# Patient Record
Sex: FEMALE | Race: WHITE | Employment: OTHER | URBAN - NONMETROPOLITAN AREA
[De-identification: names, ages, dates, MRNs, and addresses within clinical notes are randomized per-mention and may not be internally consistent; named-entity substitution may affect disease eponyms.]

---

## 2021-09-28 ENCOUNTER — APPOINTMENT (OUTPATIENT)
Dept: INTERVENTIONAL RADIOLOGY/VASCULAR | Age: 66
End: 2021-09-28
Payer: MEDICARE

## 2021-09-28 ENCOUNTER — HOSPITAL ENCOUNTER (EMERGENCY)
Age: 66
Discharge: HOME | End: 2021-09-28
Attending: EMERGENCY MEDICINE
Payer: MEDICARE

## 2021-09-28 VITALS
DIASTOLIC BLOOD PRESSURE: 93 MMHG | WEIGHT: 150 LBS | OXYGEN SATURATION: 96 % | HEART RATE: 92 BPM | BODY MASS INDEX: 21 KG/M2 | TEMPERATURE: 98 F | RESPIRATION RATE: 18 BRPM | HEIGHT: 71 IN | SYSTOLIC BLOOD PRESSURE: 134 MMHG

## 2021-09-28 DIAGNOSIS — I82.401 ACUTE DEEP VEIN THROMBOSIS (DVT) OF RIGHT LOWER EXTREMITY, UNSPECIFIED VEIN (HCC): Primary | ICD-10-CM

## 2021-09-28 LAB
ANION GAP SERPL CALCULATED.3IONS-SCNC: 12 MEQ/L (ref 8–16)
BASOPHILS # BLD: 0.4 %
BASOPHILS ABSOLUTE: 0 THOU/MM3 (ref 0–0.1)
BUN BLDV-MCNC: 20 MG/DL (ref 7–22)
CALCIUM SERPL-MCNC: 9.1 MG/DL (ref 8.5–10.5)
CHLORIDE BLD-SCNC: 103 MEQ/L (ref 98–111)
CO2: 25 MEQ/L (ref 23–33)
CREAT SERPL-MCNC: 1 MG/DL (ref 0.4–1.2)
EOSINOPHIL # BLD: 2 %
EOSINOPHILS ABSOLUTE: 0.2 THOU/MM3 (ref 0–0.4)
ERYTHROCYTE [DISTWIDTH] IN BLOOD BY AUTOMATED COUNT: 12.8 % (ref 11.5–14.5)
ERYTHROCYTE [DISTWIDTH] IN BLOOD BY AUTOMATED COUNT: 46.5 FL (ref 35–45)
GFR SERPL CREATININE-BSD FRML MDRD: 55 ML/MIN/1.73M2
GLUCOSE BLD-MCNC: 122 MG/DL (ref 70–108)
HCT VFR BLD CALC: 39.5 % (ref 37–47)
HEMOGLOBIN: 12.2 GM/DL (ref 12–16)
IMMATURE GRANS (ABS): 0.06 THOU/MM3 (ref 0–0.07)
IMMATURE GRANULOCYTES: 0.5 %
LYMPHOCYTES # BLD: 10.9 %
LYMPHOCYTES ABSOLUTE: 1.2 THOU/MM3 (ref 1–4.8)
MCH RBC QN AUTO: 30.8 PG (ref 26–33)
MCHC RBC AUTO-ENTMCNC: 30.9 GM/DL (ref 32.2–35.5)
MCV RBC AUTO: 99.7 FL (ref 81–99)
MONOCYTES # BLD: 10.7 %
MONOCYTES ABSOLUTE: 1.2 THOU/MM3 (ref 0.4–1.3)
NUCLEATED RED BLOOD CELLS: 0 /100 WBC
PLATELET # BLD: 296 THOU/MM3 (ref 130–400)
PMV BLD AUTO: 9.5 FL (ref 9.4–12.4)
POTASSIUM REFLEX MAGNESIUM: 4.6 MEQ/L (ref 3.5–5.2)
PRO-BNP: 794.9 PG/ML (ref 0–900)
RBC # BLD: 3.96 MILL/MM3 (ref 4.2–5.4)
SEG NEUTROPHILS: 75.5 %
SEGMENTED NEUTROPHILS ABSOLUTE COUNT: 8.6 THOU/MM3 (ref 1.8–7.7)
SODIUM BLD-SCNC: 140 MEQ/L (ref 135–145)
TROPONIN T: < 0.01 NG/ML
WBC # BLD: 11.4 THOU/MM3 (ref 4.8–10.8)

## 2021-09-28 PROCEDURE — 6360000002 HC RX W HCPCS: Performed by: STUDENT IN AN ORGANIZED HEALTH CARE EDUCATION/TRAINING PROGRAM

## 2021-09-28 PROCEDURE — 84484 ASSAY OF TROPONIN QUANT: CPT

## 2021-09-28 PROCEDURE — 93971 EXTREMITY STUDY: CPT

## 2021-09-28 PROCEDURE — 93005 ELECTROCARDIOGRAM TRACING: CPT | Performed by: STUDENT IN AN ORGANIZED HEALTH CARE EDUCATION/TRAINING PROGRAM

## 2021-09-28 PROCEDURE — 83880 ASSAY OF NATRIURETIC PEPTIDE: CPT

## 2021-09-28 PROCEDURE — 99283 EMERGENCY DEPT VISIT LOW MDM: CPT

## 2021-09-28 PROCEDURE — 6370000000 HC RX 637 (ALT 250 FOR IP): Performed by: EMERGENCY MEDICINE

## 2021-09-28 PROCEDURE — 85025 COMPLETE CBC W/AUTO DIFF WBC: CPT

## 2021-09-28 PROCEDURE — 36415 COLL VENOUS BLD VENIPUNCTURE: CPT

## 2021-09-28 PROCEDURE — 96372 THER/PROPH/DIAG INJ SC/IM: CPT

## 2021-09-28 PROCEDURE — 80048 BASIC METABOLIC PNL TOTAL CA: CPT

## 2021-09-28 RX ORDER — RIVAROXABAN 15 MG-20MG
KIT ORAL
Qty: 1 EACH | Refills: 0 | Status: SHIPPED | OUTPATIENT
Start: 2021-09-28 | End: 2021-11-11

## 2021-09-28 RX ORDER — KETOROLAC TROMETHAMINE 30 MG/ML
30 INJECTION, SOLUTION INTRAMUSCULAR; INTRAVENOUS ONCE
Status: DISCONTINUED | OUTPATIENT
Start: 2021-09-28 | End: 2021-09-28

## 2021-09-28 RX ORDER — HYDROCODONE BITARTRATE AND ACETAMINOPHEN 5; 325 MG/1; MG/1
1 TABLET ORAL 2 TIMES DAILY PRN
Qty: 10 TABLET | Refills: 0 | Status: SHIPPED | OUTPATIENT
Start: 2021-09-28 | End: 2021-09-30

## 2021-09-28 RX ORDER — HYDROCODONE BITARTRATE AND ACETAMINOPHEN 5; 325 MG/1; MG/1
1 TABLET ORAL ONCE
Status: COMPLETED | OUTPATIENT
Start: 2021-09-28 | End: 2021-09-28

## 2021-09-28 RX ADMIN — HYDROCODONE BITARTRATE AND ACETAMINOPHEN 1 TABLET: 5; 325 TABLET ORAL at 12:21

## 2021-09-28 RX ADMIN — ENOXAPARIN SODIUM 70 MG: 80 INJECTION SUBCUTANEOUS at 14:20

## 2021-09-28 ASSESSMENT — PAIN DESCRIPTION - LOCATION: LOCATION: LEG

## 2021-09-28 ASSESSMENT — PAIN DESCRIPTION - PAIN TYPE: TYPE: ACUTE PAIN

## 2021-09-28 ASSESSMENT — PAIN SCALES - GENERAL: PAINLEVEL_OUTOF10: 10

## 2021-09-28 ASSESSMENT — PAIN DESCRIPTION - DESCRIPTORS: DESCRIPTORS: SHARP;CONSTANT

## 2021-09-28 ASSESSMENT — ENCOUNTER SYMPTOMS
EYES NEGATIVE: 1
RESPIRATORY NEGATIVE: 1
GASTROINTESTINAL NEGATIVE: 1

## 2021-09-28 ASSESSMENT — PAIN DESCRIPTION - ORIENTATION: ORIENTATION: RIGHT

## 2021-09-28 NOTE — ED NOTES
PT presents to the ed with c/o severe right leg pain. PT states that she travel to [place] last weekend and has had pain since. No known injury. PT had hx of dvt. PT denies taking anything for pain at this time.       [place], [place]  45/20/88 2819

## 2021-09-28 NOTE — ED PROVIDER NOTES
[address] [address]          Pt Name: Karey Case  MRN: [id number]  [place] 1955  Date of evaluation: 9/28/2021  Treating Resident Physician: Concha Villareal MD  Supervising Physician: Tacho Juares MD.    [address] [place] [address]       Chief Complaint   Patient presents with    Leg Pain     History obtained from the patient. HISTORY OF PRESENT ILLNESS    HPI  Karey Case is a 74 y.o. female who presents to the emergency department for evaluation of leg pain. Patient refers 2 days of right leg pain associated with swelling. Symptoms started after travel from [place]. Patient denies shortness of breath or chest pain however patient has had previous DVT as well as pulmonary embolisms. The patient has no other acute complaints at this time. REVIEW OF SYSTEMS   Review of Systems   Constitutional: Negative. HENT: Negative. Eyes: Negative. Respiratory: Negative. Cardiovascular: Negative. Gastrointestinal: Negative. Genitourinary: Negative. Musculoskeletal: Negative. Neurological: Negative. Psychiatric/Behavioral: Negative. PAST MEDICAL AND SURGICAL HISTORY   No past medical history on file. No past surgical history on file. MEDICATIONS   No current facility-administered medications for this encounter. Current Outpatient Medications:     rivaroxaban (XARELTO STARTER PACK) 15 & 20 MG Starter Pack, Oral: 15 mg twice daily with food for 21 days followed by 20 mg once daily with food. , Disp: 1 each, Rfl: 0    HYDROcodone-acetaminophen (NORCO) 5-325 MG per tablet, Take 1 tablet by mouth 2 times daily as needed for Pain for up to 5 days. , Disp: 10 tablet, Rfl: 0      SOCIAL HISTORY     Social History     Social History Narrative    Not on file     Social History     Tobacco Use    Smoking status: Not on file   Substance Use Topics    Alcohol use: Not on file    Drug use: Not on file ALLERGIES     Allergies   Allergen Reactions    Penicillins          FAMILY HISTORY   No family history on file. PREVIOUS RECORDS   Previous records reviewed: Previous medical history of factor V deficiency, DVT both sides, pulmonary embolism. Current medications aspirin. Denies recent trauma, no new surgeries. .        PHYSICAL EXAM     ED Triage Vitals [09/28/21 0958]   BP Temp Temp Source Pulse Resp SpO2 Height Weight   (!) 134/93 98 °F (36.7 °C) Oral 92 18 96 % 5' 10.5\" (1.791 m) 150 lb (68 kg)     Initial vital signs and nursing assessment reviewed and abnormal from Tachycardia, high systolic blood pressure. Body mass index is 21.22 kg/m². Pulsoximetry is normal per my interpretation. Additional Vital Signs:  Vitals:    09/28/21 0958   BP: (!) 134/93   Pulse: 92   Resp: 18   Temp: 98 °F (36.7 °C)   SpO2: 96%       Physical Exam  Constitutional:       Appearance: Normal appearance. HENT:      Head: Normocephalic and atraumatic. Nose: Nose normal.      Mouth/Throat:      Mouth: Mucous membranes are moist.   Eyes:      Pupils: Pupils are equal, round, and reactive to light. Cardiovascular:      Rate and Rhythm: Regular rhythm. Tachycardia present. Pulses: Normal pulses. Heart sounds: Normal heart sounds. Pulmonary:      Effort: Pulmonary effort is normal.      Breath sounds: Normal breath sounds. Abdominal:      General: Abdomen is flat. Palpations: Abdomen is soft. Musculoskeletal:         General: Swelling and tenderness (Right calf, bilateral pulses present, increased diameter more than 2 cm) present. Cervical back: Normal range of motion. Skin:     General: Skin is warm. Capillary Refill: Capillary refill takes less than 2 seconds. Neurological:      General: No focal deficit present. Mental Status: She is alert and oriented to person, place, and time.              MEDICAL DECISION MAKING   Initial Assessment:   3 69-year-old year old patient complaining of right leg pain and edema, risk factors associated. Physical examination showed increased diameter. Considering DVT we will ask venous Doppler as well has lab tests to rule out cardiac strain. 2. Clinical impression  a. Right DVT high risk/Wells 6    Plan:    Venous Doppler right leg   Troponin, BMP, CBC, BNP        ED RESULTS   Laboratory results:  Labs Reviewed   CBC WITH AUTO DIFFERENTIAL - Abnormal; Notable for the following components:       Result Value    WBC 11.4 (*)     RBC 3.96 (*)     MCV 99.7 (*)     MCHC 30.9 (*)     RDW-SD 46.5 (*)     Segs Absolute 8.6 (*)     All other components within normal limits   BASIC METABOLIC PANEL W/ REFLEX TO MG FOR LOW K - Abnormal; Notable for the following components:    Glucose 122 (*)     All other components within normal limits   GLOMERULAR FILTRATION RATE, ESTIMATED - Abnormal; Notable for the following components:    Est, Glom Filt Rate 55 (*)     All other components within normal limits   BRAIN NATRIURETIC PEPTIDE   TROPONIN   ANION GAP       Radiologic studies results:  VL DUP LOWER EXTREMITY VENOUS RIGHT   Final Result      1. Lack of compressibility in the right mid and distal superficial femoral, popliteal, posterior tibial, peroneal and anterior tibial veins suspicious for thrombosis. 2. There is normal flow in the right common femoral vein and greater saphenous vein. .               **This report has been created using voice recognition software. It may contain minor errors which are inherent in voice recognition technology. **      Final report electronically signed by DR Chelsey Alston on 9/28/2021 12:58 PM          ED Medications administered this visit:   Medications   HYDROcodone-acetaminophen (NORCO) 5-325 MG per tablet 1 tablet (1 tablet Oral Given 9/28/21 1221)   enoxaparin (LOVENOX) injection 70 mg (70 mg SubCUTAneous Given 9/28/21 1420)         ED COURSE     ED Course as of Sep 28 1431   Tue Sep 28, 2021   1316 Lack of compressibility in the right mid and distal superficial femoral, popliteal, posterior tibial, peroneal and anterior tibial veins suspicious for thrombosis. 2. There is normal flow in the right common femoral vein and greater saphenous vein. .    [JR]   1342 Pro-BNP: 794.9 [JR]   1343 Leukocytosis mild   CBC Auto Differential(!):    WBC 11.4(!)   RBC 3.96(!)   Hemoglobin Quant 12.2   Hematocrit 39.5   MCV 99.7(!)   MCH 30.8   MCHC 30.9(!)   RDW-CV 12.8   RDW-SD 46.5(!)   Platelet Count 874   MPV 9.5   Seg Neutrophils 75.5   Lymphocytes 10.9   Monocytes 10.7   Eosinophils 2.0   Basophils 0.4   Immature Granulocytes 0.5   Segs Absolute 8.6(!)   Lymphocytes Absolute 1.2   Monocytes Absolute 1.2   Eosinophils Absolute 0.2   Basophils Absolute 0.0   Immature Grans (Abs) 0.06   Nucleated Red Blood Cells 0 [JR]   1343 Normal   Troponin:    Troponin T < 0.010 [JR]   1343 Normal   Brain Natriuretic Peptide:    Pro-.9 [JR]   1343 Normal   Basic Metabolic Panel w/ Reflex to MG(!):    Sodium 140   Potassium 4.6   Chloride 103   CO2 25   Glucose 122(!)   BUN 20   Creatinine 1.0   Calcium 9.1 [JR]   1344 Sinus rhythm, normal heart rate, normal P wave, narrow QRS, no ST elevation   EKG 12 Lead [JR]   1428 Mild leukocytosis   CBC Auto Differential(!):    WBC 11.4(!)   RBC 3.96(!)   Hemoglobin Quant 12.2   Hematocrit 39.5   MCV 99.7(!)   MCH 30.8   MCHC 30.9(!)   RDW-CV 12.8   RDW-SD 46.5(!)   Platelet Count 850   MPV 9.5   Seg Neutrophils 75.5   Lymphocytes 10.9   Monocytes 10.7   Eosinophils 2.0   Basophils 0.4   Immature Granulocytes 0.5   Segs Absolute 8.6(!)   Lymphocytes Absolute 1.2   Monocytes Absolute 1.2   Eosinophils Absolute 0.2   Basophils Absolute 0.0   Immature Grans (Abs) 0.06   Nucleated Red Blood Cells 0 [JR]   1428 Normal   Troponin:    Troponin T < 0.010 [JR]   1428 Normal   Basic Metabolic Panel w/ Reflex to MG(!):    Sodium 140   Potassium 4.6   Chloride 103   CO2 25   Glucose 122(!)   BUN 20   Creatinine 1.0   Calcium 9.1 [JR]   1428 Normal   Brain Natriuretic Peptide:    Pro-.9 [JR]      ED Course User Index  [JR] Monalisa Mayo MD       Reassessment  78year-old patient complaining of leg pain, with evidence of distal DVT. Laboratory tests negative for right heart failure, patient with no shortness of breath, no chest pain, with adequate answer to analgesia. We started anticoagulation during ED observation. An appointment with internal medicine was a scheduled as well has oral anticoagulation Xarelto was prescribed. Alarm signs and recommendations were given to patient understands and agrees. Decision to discharge    Strict return precautions and follow up instructions were discussed with the patient prior to discharge, with which the patient agrees. MEDICATION CHANGES     Discharge Medication List as of 9/28/2021  2:23 PM      START taking these medications    Details   rivaroxaban (XARELTO STARTER PACK) 15 & 20 MG Starter Pack Oral: 15 mg twice daily with food for 21 days followed by 20 mg once daily with food. , Disp-1 each, R-0Print      HYDROcodone-acetaminophen (NORCO) 5-325 MG per tablet Take 1 tablet by mouth 2 times daily as needed for Pain for up to 5 days. , Disp-10 tablet, R-0Print               FINAL DISPOSITION     Final diagnoses:   Acute deep vein thrombosis (DVT) of right lower extremity, unspecified vein (HCC)     Condition: condition: good  Dispo: Discharge to home      This transcription was electronically signed. Parts of this transcriptions may have been dictated by use of voice recognition software and electronically transcribed, and parts may have been transcribed with the assistance of an ED scribe. The transcription may contain errors not detected in proofreading. Please refer to my supervising physician's documentation if my documentation differs.     Electronically Signed: Monalisa Mayo MD, 09/28/21, 2:31 PM         Monalisa Mayo MD  Resident  09/28/21 1466

## 2021-09-30 ENCOUNTER — OFFICE VISIT (OUTPATIENT)
Dept: INTERNAL MEDICINE CLINIC | Age: 66
End: 2021-09-30
Payer: MEDICARE

## 2021-09-30 ENCOUNTER — HOSPITAL ENCOUNTER (OUTPATIENT)
Age: 66
Discharge: HOME | End: 2021-09-30
Payer: MEDICARE

## 2021-09-30 ENCOUNTER — TELEPHONE (OUTPATIENT)
Dept: CARDIOLOGY CLINIC | Age: 66
End: 2021-09-30

## 2021-09-30 VITALS
DIASTOLIC BLOOD PRESSURE: 74 MMHG | TEMPERATURE: 97.5 F | WEIGHT: 180.6 LBS | HEART RATE: 84 BPM | BODY MASS INDEX: 25.28 KG/M2 | HEIGHT: 71 IN | SYSTOLIC BLOOD PRESSURE: 122 MMHG

## 2021-09-30 DIAGNOSIS — I82.401 ACUTE DEEP VEIN THROMBOSIS (DVT) OF RIGHT LOWER EXTREMITY, UNSPECIFIED VEIN (HCC): ICD-10-CM

## 2021-09-30 DIAGNOSIS — R30.0 DYSURIA: ICD-10-CM

## 2021-09-30 DIAGNOSIS — I82.401 ACUTE DEEP VEIN THROMBOSIS (DVT) OF RIGHT LOWER EXTREMITY, UNSPECIFIED VEIN (HCC): Primary | ICD-10-CM

## 2021-09-30 LAB
ANION GAP SERPL CALCULATED.3IONS-SCNC: 12 MEQ/L (ref 8–16)
BACTERIA: ABNORMAL
BILIRUBIN URINE: NEGATIVE
BLOOD, URINE: ABNORMAL
BUN BLDV-MCNC: 24 MG/DL (ref 7–22)
CALCIUM SERPL-MCNC: 8.8 MG/DL (ref 8.5–10.5)
CASTS: ABNORMAL /LPF
CASTS: ABNORMAL /LPF
CHARACTER, URINE: CLEAR
CHLORIDE BLD-SCNC: 101 MEQ/L (ref 98–111)
CO2: 24 MEQ/L (ref 23–33)
COLOR: YELLOW
CREAT SERPL-MCNC: 1 MG/DL (ref 0.4–1.2)
CRYSTALS: ABNORMAL
EKG ATRIAL RATE: 74 BPM
EKG P AXIS: 48 DEGREES
EKG P-R INTERVAL: 134 MS
EKG Q-T INTERVAL: 390 MS
EKG QRS DURATION: 90 MS
EKG QTC CALCULATION (BAZETT): 432 MS
EKG R AXIS: 49 DEGREES
EKG T AXIS: 40 DEGREES
EKG VENTRICULAR RATE: 74 BPM
EPITHELIAL CELLS, UA: ABNORMAL /HPF
GFR SERPL CREATININE-BSD FRML MDRD: 55 ML/MIN/1.73M2
GLUCOSE BLD-MCNC: 94 MG/DL (ref 70–108)
GLUCOSE, URINE: NEGATIVE MG/DL
KETONES, URINE: NEGATIVE
LEUKOCYTE ESTERASE, URINE: NEGATIVE
MISCELLANEOUS LAB TEST RESULT: ABNORMAL
NITRITE, URINE: NEGATIVE
PH UA: 5 (ref 5–9)
POTASSIUM SERPL-SCNC: 4.8 MEQ/L (ref 3.5–5.2)
PROTEIN UA: 30 MG/DL
RBC URINE: ABNORMAL /HPF
RENAL EPITHELIAL, UA: ABNORMAL
SODIUM BLD-SCNC: 137 MEQ/L (ref 135–145)
SPECIFIC GRAVITY UA: 1.01 (ref 1–1.03)
UROBILINOGEN, URINE: 0.2 EU/DL (ref 0–1)
WBC UA: ABNORMAL /HPF
YEAST: ABNORMAL

## 2021-09-30 PROCEDURE — 1123F ACP DISCUSS/DSCN MKR DOCD: CPT | Performed by: STUDENT IN AN ORGANIZED HEALTH CARE EDUCATION/TRAINING PROGRAM

## 2021-09-30 PROCEDURE — 36415 COLL VENOUS BLD VENIPUNCTURE: CPT

## 2021-09-30 PROCEDURE — G8400 PT W/DXA NO RESULTS DOC: HCPCS | Performed by: STUDENT IN AN ORGANIZED HEALTH CARE EDUCATION/TRAINING PROGRAM

## 2021-09-30 PROCEDURE — 93010 ELECTROCARDIOGRAM REPORT: CPT | Performed by: INTERNAL MEDICINE

## 2021-09-30 PROCEDURE — 99204 OFFICE O/P NEW MOD 45 MIN: CPT | Performed by: STUDENT IN AN ORGANIZED HEALTH CARE EDUCATION/TRAINING PROGRAM

## 2021-09-30 PROCEDURE — 1090F PRES/ABSN URINE INCON ASSESS: CPT | Performed by: STUDENT IN AN ORGANIZED HEALTH CARE EDUCATION/TRAINING PROGRAM

## 2021-09-30 PROCEDURE — 80048 BASIC METABOLIC PNL TOTAL CA: CPT

## 2021-09-30 PROCEDURE — G8427 DOCREV CUR MEDS BY ELIG CLIN: HCPCS | Performed by: STUDENT IN AN ORGANIZED HEALTH CARE EDUCATION/TRAINING PROGRAM

## 2021-09-30 PROCEDURE — 1036F TOBACCO NON-USER: CPT | Performed by: STUDENT IN AN ORGANIZED HEALTH CARE EDUCATION/TRAINING PROGRAM

## 2021-09-30 PROCEDURE — G8417 CALC BMI ABV UP PARAM F/U: HCPCS | Performed by: STUDENT IN AN ORGANIZED HEALTH CARE EDUCATION/TRAINING PROGRAM

## 2021-09-30 PROCEDURE — 3017F COLORECTAL CA SCREEN DOC REV: CPT | Performed by: STUDENT IN AN ORGANIZED HEALTH CARE EDUCATION/TRAINING PROGRAM

## 2021-09-30 PROCEDURE — 4040F PNEUMOC VAC/ADMIN/RCVD: CPT | Performed by: STUDENT IN AN ORGANIZED HEALTH CARE EDUCATION/TRAINING PROGRAM

## 2021-09-30 PROCEDURE — 81001 URINALYSIS AUTO W/SCOPE: CPT

## 2021-09-30 RX ORDER — RIVAROXABAN 15 MG-20MG
KIT ORAL
Qty: 1 EACH | Refills: 0 | Status: CANCELLED | OUTPATIENT
Start: 2021-09-30

## 2021-09-30 RX ORDER — HYDROCODONE BITARTRATE AND ACETAMINOPHEN 5; 325 MG/1; MG/1
1 TABLET ORAL EVERY 6 HOURS PRN
Qty: 12 TABLET | Refills: 0 | Status: SHIPPED | OUTPATIENT
Start: 2021-10-02 | End: 2021-10-08

## 2021-09-30 SDOH — ECONOMIC STABILITY: FOOD INSECURITY: WITHIN THE PAST 12 MONTHS, THE FOOD YOU BOUGHT JUST DIDN'T LAST AND YOU DIDN'T HAVE MONEY TO GET MORE.: NEVER TRUE

## 2021-09-30 SDOH — ECONOMIC STABILITY: FOOD INSECURITY: WITHIN THE PAST 12 MONTHS, YOU WORRIED THAT YOUR FOOD WOULD RUN OUT BEFORE YOU GOT MONEY TO BUY MORE.: NEVER TRUE

## 2021-09-30 ASSESSMENT — ENCOUNTER SYMPTOMS
RHINORRHEA: 0
COUGH: 0
COLOR CHANGE: 0
SHORTNESS OF BREATH: 0
PHOTOPHOBIA: 0
BLOOD IN STOOL: 0
TROUBLE SWALLOWING: 0
ABDOMINAL PAIN: 0

## 2021-09-30 ASSESSMENT — SOCIAL DETERMINANTS OF HEALTH (SDOH): HOW HARD IS IT FOR YOU TO PAY FOR THE VERY BASICS LIKE FOOD, HOUSING, MEDICAL CARE, AND HEATING?: NOT HARD AT ALL

## 2021-09-30 ASSESSMENT — PATIENT HEALTH QUESTIONNAIRE - PHQ9
SUM OF ALL RESPONSES TO PHQ QUESTIONS 1-9: 0
SUM OF ALL RESPONSES TO PHQ QUESTIONS 1-9: 0
SUM OF ALL RESPONSES TO PHQ9 QUESTIONS 1 & 2: 0
SUM OF ALL RESPONSES TO PHQ QUESTIONS 1-9: 0
2. FEELING DOWN, DEPRESSED OR HOPELESS: 0
1. LITTLE INTEREST OR PLEASURE IN DOING THINGS: 0

## 2021-09-30 NOTE — PROGRESS NOTES
Patient Name: Davion Turner  YOB: 1955  MRN: [id number]  Office visit date: 9/30/2021    Chief Complaint: Follow-Up from Hospital (f/u [facility]  DVT right leg started on Xarelto )    Assessment/Plan:  1. Acute deep vein thrombosis (DVT) of right lower extremity, unspecified vein (HCC)  -     Mercy Klein MD, Cardiology, [facility]  -     rivaroxaban (XARELTO) 20 MG TABS tablet; Take 1 tablet by mouth daily (with breakfast), Disp-90 tablet, R-1Normal  -     Basic Metabolic Panel; Future  -     HYDROcodone-acetaminophen (NORCO) 5-325 MG per tablet; Take 1 tablet by mouth every 6 hours as needed for Pain for up to 6 days. Intended supply: 7 days. Take lowest dose possible to manage pain, Disp-12 tablet, R-0Normal  2. Dysuria  -     Urinalysis With Microscopic; Future  -     Urine Rt Reflex To Culture  -     Basic Metabolic Panel; Future    -Sent prescription for Xarelto maintenance dose after completing the starter pack that was initiated in emergency department. Also gave short course of Norco until evaluated by interventional cardiology. Return in about 3 weeks (around 10/21/2021). Subjective/Objective:    HPI:     Davion Turner is a 74 y.o. female who presents for a new patient visit. She is here for evaluation of Follow-Up from Hospital (f/u [facility]  DVT right leg started on Xarelto )    Patient presents for initial office visit after being evaluated in the ED for RLE swelling and pain on 9/28 which started shortly after completing 2.5-hour trip from [place]. Further work-up revealed extensive RLE DVT. Patient was discharged from the ED on a starter pack of Xarelto and Norco.  On questioning during office visit, patient endorsed history of factor V Leiden mutation, prior DVTs and PE, and COPD. Today, she endorses dark appearing urine without gross hematuria which may be attributable to poor p.o. intake.   Patient has not been hydrating appropriately because she experiences significant amount of pain while ambulating and wanted to minimize bathroom visits. Previously, about 4 years ago patient was on Coumadin which she stopped by personal choice. While on Coumadin she endorsed frequent nosebleeds and easy bruising. Since stopping Coumadin she has not had any issues until now. Patient is [facility] [employer] therefore we had an extensive discussion regarding reversibility of various agents and presented patient with options. [facility]/b/a of [facility]w [facility], she understands. As of today patient decided to continue with Xarelto. Due to severe pain that the patient experiences, sent referral to Dr. Judith Torres cardiology to evaluate for potential intervention. Past Medical History:   Diagnosis Date    Factor V Leiden [facility])        Past Surgical History:   Procedure Laterality Date    SHOULDER SURGERY         Current Outpatient Medications   Medication Sig Dispense Refill    rivaroxaban (XARELTO) 20 MG TABS tablet Take 1 tablet by mouth daily (with breakfast) 90 tablet 1    [START ON 10/2/2021] HYDROcodone-acetaminophen (NORCO) 5-325 MG per tablet Take 1 tablet by mouth every 6 hours as needed for Pain for up to 6 days. Intended supply: 7 days. Take lowest dose possible to manage pain 12 tablet 0    rivaroxaban (XARELTO STARTER PACK) 15 & 20 MG Starter Pack Oral: 15 mg twice daily with food for 21 days followed by 20 mg once daily with food. 1 each 0     No current facility-administered medications for this visit.        Allergies   Allergen Reactions    Penicillins        Social History     Socioeconomic History    Marital status:      Spouse name: Not on file    Number of children: Not on file    Years of education: Not on file    Highest education level: Not on file   Occupational History    Not on file   Tobacco Use    Smoking status: Never Smoker    Smokeless tobacco: Never Used   Substance and Sexual Activity    Alcohol use: Yes     Comment: rarely    Drug use: Never    Sexual activity: Not on file   Other Topics Concern    Not on file   Social History Narrative    Not on file     Social Determinants of Health     Financial Resource Strain: Low Risk     Difficulty of Paying Living Expenses: Not hard at all   Food Insecurity: No Food Insecurity    Worried About Running Out of Food in the Last Year: Never true    [address] in the Last Year: Never true   Transportation Needs:     Lack of Transportation (Medical):  Lack of Transportation (Non-Medical):    Physical Activity:     Days of Exercise per Week:     Minutes of Exercise per Session:    Stress:     Feeling of Stress :    Social Connections:     Frequency of Communication with Friends and Family:     Frequency of Social Gatherings with Friends and Family:     Attends Muslim Services:     Active Member of Clubs or Organizations:     Attends Club or Organization Meetings:     Marital Status:    Intimate Partner Violence:     Fear of Current or Ex-Partner:     Emotionally Abused:     Physically Abused:     Sexually Abused:        Family History   Problem Relation Age of Onset    Coronary Art Dis Father     Cancer Brother        Review of Systems   Constitutional: Negative for fatigue and fever. HENT: Negative for rhinorrhea and trouble swallowing. Eyes: Negative for photophobia and visual disturbance. Respiratory: Negative for cough and shortness of breath. Cardiovascular: Positive for leg swelling. Negative for chest pain. Gastrointestinal: Negative for abdominal pain and blood in stool. Endocrine: Negative for cold intolerance and heat intolerance. Genitourinary: Positive for dysuria. Negative for flank pain. Musculoskeletal: Negative for arthralgias and myalgias. Skin: Negative for color change and wound. Allergic/Immunologic: Negative for immunocompromised state. Neurological: Negative for syncope and headaches.    Hematological: Negative for adenopathy. Does not bruise/bleed easily. Psychiatric/Behavioral: Negative for agitation and hallucinations. Vitals:    09/30/21 1304   BP: 122/74   Site: Left Upper Arm   Position: Sitting   Cuff Size: Medium Adult   Pulse: 84   Temp: 97.5 °F (36.4 °C)   Weight: 180 lb 9.6 oz (81.9 kg)   Height: 5' 10.5\" (1.791 m)       Wt Readings from Last 3 Encounters:   09/30/21 180 lb 9.6 oz (81.9 kg)   09/28/21 150 lb (68 kg)       BP Readings from Last 3 Encounters:   09/30/21 122/74   09/28/21 (!) 134/93       Physical Exam  Vitals and nursing note reviewed. Constitutional:       Appearance: Normal appearance. HENT:      Head: Normocephalic and atraumatic. Right Ear: External ear normal.      Left Ear: External ear normal.      Nose: Nose normal.      Mouth/Throat:      Pharynx: Oropharynx is clear. Eyes:      Extraocular Movements: Extraocular movements intact. Cardiovascular:      Rate and Rhythm: Normal rate and regular rhythm. Pulses: Normal pulses. Heart sounds: Normal heart sounds. Pulmonary:      Effort: Pulmonary effort is normal.      Breath sounds: Normal breath sounds. Abdominal:      General: Abdomen is flat. Palpations: Abdomen is soft. Musculoskeletal:         General: Swelling and tenderness present. No deformity. Normal range of motion. Cervical back: Normal range of motion and neck supple. Right lower leg: Swelling and tenderness present. Edema present. Legs:    Skin:     General: Skin is warm and dry. Capillary Refill: Capillary refill takes less than 2 seconds. Neurological:      General: No focal deficit present. Mental Status: She is alert. Mental status is at baseline. Psychiatric:         Mood and Affect: Mood normal.         Behavior: Behavior normal.         Diagnostic data:  I have reviewed recent diagnostic testing including labs with the patient today. No results found for this visit on 09/30/21.     VL duplex RLE 9/28: 1. Lack of compressibility in the right mid and distal superficial femoral, popliteal, posterior tibial, peroneal and anterior tibial veins suspicious for thrombosis. 2. There is normal flow in the right common femoral vein and greater saphenous vein. .         Controlled Substances Monitoring:       Laboratory/imaging studies ordered this visit: KLEVER, YONATAN    The patient is in agreement with and verbalizes understanding of the plan of care today and has no additional questions or complaints. The patient was instructed to follow-up in 3 wks or to contact our office sooner if problems should arise. Laboratory studies will be completed prior to next visit. All findings, labs and other data reviewed, assessment and plan created with review by Dr. Yakelin Vergara MD.    Electronically signed by: Kashmir Pruitt MD on 9/30/2021 at 5:50 PM  (Please note that portions of this note were completed with a voice recognition program and electronically transcribed. Efforts were made to edit the dictations but occasionally words are mis-transcribed. This transcription may contain errors not detected in proofreading.  This transcription was electronically signed.)

## 2021-09-30 NOTE — TELEPHONE ENCOUNTER
Dr. Hamida Pena's office called wanting to get patient in ASAP to see Dr. Jaswinder Torres due to DVT. Appt scheduled for 10/7/2021-Is that okay or do you want to see patient sooner? Let me know. Thanks!

## 2021-10-05 ENCOUNTER — OFFICE VISIT (OUTPATIENT)
Dept: CARDIOLOGY CLINIC | Age: 66
End: 2021-10-05
Payer: MEDICARE

## 2021-10-05 ENCOUNTER — TELEPHONE (OUTPATIENT)
Dept: INTERNAL MEDICINE CLINIC | Age: 66
End: 2021-10-05

## 2021-10-05 VITALS
BODY MASS INDEX: 25.06 KG/M2 | HEIGHT: 71 IN | HEART RATE: 72 BPM | WEIGHT: 179 LBS | DIASTOLIC BLOOD PRESSURE: 77 MMHG | SYSTOLIC BLOOD PRESSURE: 127 MMHG

## 2021-10-05 DIAGNOSIS — I82.419 DEEP VEIN THROMBOSIS (DVT) OF FEMORAL VEIN, UNSPECIFIED CHRONICITY, UNSPECIFIED LATERALITY (HCC): Primary | ICD-10-CM

## 2021-10-05 PROCEDURE — 99204 OFFICE O/P NEW MOD 45 MIN: CPT | Performed by: INTERNAL MEDICINE

## 2021-10-05 PROCEDURE — G8400 PT W/DXA NO RESULTS DOC: HCPCS | Performed by: INTERNAL MEDICINE

## 2021-10-05 PROCEDURE — G8484 FLU IMMUNIZE NO ADMIN: HCPCS | Performed by: INTERNAL MEDICINE

## 2021-10-05 PROCEDURE — 1036F TOBACCO NON-USER: CPT | Performed by: INTERNAL MEDICINE

## 2021-10-05 PROCEDURE — 4040F PNEUMOC VAC/ADMIN/RCVD: CPT | Performed by: INTERNAL MEDICINE

## 2021-10-05 PROCEDURE — 1123F ACP DISCUSS/DSCN MKR DOCD: CPT | Performed by: INTERNAL MEDICINE

## 2021-10-05 PROCEDURE — G8427 DOCREV CUR MEDS BY ELIG CLIN: HCPCS | Performed by: INTERNAL MEDICINE

## 2021-10-05 PROCEDURE — 1090F PRES/ABSN URINE INCON ASSESS: CPT | Performed by: INTERNAL MEDICINE

## 2021-10-05 PROCEDURE — G8417 CALC BMI ABV UP PARAM F/U: HCPCS | Performed by: INTERNAL MEDICINE

## 2021-10-05 PROCEDURE — 3017F COLORECTAL CA SCREEN DOC REV: CPT | Performed by: INTERNAL MEDICINE

## 2021-10-05 NOTE — PROGRESS NOTES
[address] [address] [place] [address]  Dept: [phone]  Dept Fax: [phone]  Loc: [phone]    Visit Date: 10/5/2021    Ms. Callie Hood is a 74 y.o. female  who presented for:  F/u DVT    HPI:   HPI     Present for f/u after DVT episode a week ago. She was put on xarelto and discharged. Here to discuss possible intervention. Hx of Factor V Leiden and prior DVTs and PE. She was on coumdin, stopped by personal choice 4 or 5 years ago, and was put on xarelto after ED visit. Hoping to be put on coumadin because she was told that it is easier to stop bleeding when she is coumadin. Pt is a Jehovah witness. . No hx of CAD or prior cath. Does not complain of SOB or chest pain or palpitations. Still has leg pain, improved after taking xarelto but now worse, it worsens with movement, better when legs are raised. Swelling is getting better. No bleeding episodes since been put on xarelto. P: no echo on file. V: no echo on file     A: No known hx of CAD, no cath, no stress test.     R: Normal Sinus rhythm with signs of LVH hypertrophy. P: hx of DVTs. No known hx of PAD. Current Outpatient Medications:     rivaroxaban (XARELTO) 20 MG TABS tablet, Take 1 tablet by mouth daily (with breakfast), Disp: 90 tablet, Rfl: 1    HYDROcodone-acetaminophen (NORCO) 5-325 MG per tablet, Take 1 tablet by mouth every 6 hours as needed for Pain for up to 6 days. Intended supply: 7 days. Take lowest dose possible to manage pain, Disp: 12 tablet, Rfl: 0    rivaroxaban (XARELTO STARTER PACK) 15 & 20 MG Starter Pack, Oral: 15 mg twice daily with food for 21 days followed by 20 mg once daily with food. , Disp: 1 each, Rfl: 0    Past Medical History  Silvia Rodgers  has a past medical history of Factor V Leiden ([address] Social History  Silvia Rodgers  reports that she has never smoked.  She has never used smokeless tobacco. She reports current alcohol use. She reports that she does not use drugs. Family History  Stella Veloz family history includes Cancer in her brother; Coronary Art Dis in her father. There is no family history of bicuspid aortic valve, aneurysms, heart transplant, pacemakers, defibrillators, or sudden cardiac death. Past Surgical History   Past Surgical History:   Procedure Laterality Date    SHOULDER SURGERY         Review of Systems   Constitutional: Negative for chills and fever  HENT: Negative for congestion, sinus pressure, sneezing and sore throat. Eyes: Negative for pain, discharge, redness and itching. Respiratory: Negative for apnea, cough  Gastrointestinal: Negative for blood in stool, constipation, diarrhea   Endocrine: Negative for cold intolerance, heat intolerance, polydipsia. Genitourinary: Negative for dysuria, enuresis, flank pain and hematuria. Musculoskeletal: Negative for arthralgias, joint swelling and neck pain. Neurological: Negative for numbness and headaches. Psychiatric/Behavioral: Negative for agitation, confusion, decreased concentration and dysphoric mood. Objective: There were no vitals taken for this visit. Wt Readings from Last 3 Encounters:   09/30/21 180 lb 9.6 oz (81.9 kg)   09/28/21 150 lb (68 kg)     BP Readings from Last 3 Encounters:   09/30/21 122/74   09/28/21 (!) 134/93       Nursing note and vitals reviewed. Physical Exam   Constitutional: Oriented to person, place, and time. Appears well-developed and well-nourished. HENT:   Head: Normocephalic and atraumatic. Eyes: EOM are normal. Pupils are equal, round, and reactive to light. Neck: Normal range of motion. Neck supple. No JVD present. Cardiovascular: Normal rate, regular rhythm, normal heart sounds and intact distal pulses. No murmur heard. Pulmonary/Chest: Effort normal and breath sounds normal. No respiratory distress. No wheezes. No rales. Abdominal: Soft. Bowel sounds are normal. No distension. There is no tenderness. Musculoskeletal: Normal range of motion. No edema. Neurological: Alert and oriented to person, place, and time. No cranial nerve deficit. Coordination normal.   Skin: Skin is warm and dry. Psychiatric: Normal mood and affect. No results found for: CKTOTAL, CKMB, CKMBINDEX    Lab Results   Component Value Date    WBC 11.4 09/28/2021    RBC 3.96 09/28/2021    HGB 12.2 09/28/2021    HCT 39.5 09/28/2021    MCV 99.7 09/28/2021    MCH 30.8 09/28/2021    MCHC 30.9 09/28/2021     09/28/2021    MPV 9.5 09/28/2021       Lab Results   Component Value Date     09/30/2021    K 4.8 09/30/2021    K 4.6 09/28/2021     09/30/2021    CO2 24 09/30/2021    BUN 24 09/30/2021    CREATININE 1.0 09/30/2021    CALCIUM 8.8 09/30/2021    LABGLOM 55 09/30/2021    GLUCOSE 94 09/30/2021       No results found for: ALKPHOS, ALT, AST, PROT, BILITOT, BILIDIR, IBILI, LABALBU    No results found for: MG    No results found for: INR, PROTIME      No results found for: LABA1C    No results found for: TRIG, HDL, LDLCALC, LDLDIRECT, LABVLDL    No results found for: TSH      Testing Reviewed:      I have individually reviewed the cardiac test below:    ECHO: No results found for this or any previous visit. Assessment/Plan   Hx of multiple DVT and PE. No known obstructive CAD. 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 No need for intervention at this moment because DVT was not severe and also Yazidism beliefs preventing us from blood replacment during intervention. Continue on xarelto, discuss medication plans with IM clinic. Electronically signed by Lucrecia Craft   10/5/2021 at 9:23 AM EDT    Attending Supervising [de-identified] Attestation Statement  I performed a history and physical examination on the patient and discussed the management with the resident physician/med student.  I reviewed and agree with the findings and plan as documented in the resident's/med student's note except for as noted below. 73 yo F hx of FV Leiden with hx of DVT/PE with recurrent DVT after she stopped her [address] years prior; DVT occurred during long drive. Duplex shows primary mid femoral vein involvement, no proximal CFV or iliac obstruction. She is Latter day. Discussed indications for intervention at this time, she has mild swelling, no phlegmasia or progression. She can ambulate and has a pulse in the leg. Continue [address] for now. No intervention at this time. Wraps, elevate. Continue to ambulate. Discussed diet/exercise/BP/weight loss/health lifestyle choices/lipids; the patient understands the goals and will try to comply.     Electronically signed by Ruby Beyer MD on 10/6/21 at 10:59 AM EDT

## 2021-10-05 NOTE — TELEPHONE ENCOUNTER
Pt verbalized understanding. States she will increase water intake. Cardio recommended she cont. With compression stockings and to do stretches and follow up with our office to follow the leg issue. Pt seems do be doing well.

## 2021-10-21 ENCOUNTER — OFFICE VISIT (OUTPATIENT)
Dept: INTERNAL MEDICINE CLINIC | Age: 66
End: 2021-10-21
Payer: MEDICARE

## 2021-10-21 VITALS
BODY MASS INDEX: 25.17 KG/M2 | DIASTOLIC BLOOD PRESSURE: 80 MMHG | HEART RATE: 80 BPM | TEMPERATURE: 98.1 F | HEIGHT: 71 IN | SYSTOLIC BLOOD PRESSURE: 146 MMHG | WEIGHT: 179.8 LBS

## 2021-10-21 DIAGNOSIS — I10 PRIMARY HYPERTENSION: ICD-10-CM

## 2021-10-21 DIAGNOSIS — N18.31 STAGE 3A CHRONIC KIDNEY DISEASE (HCC): ICD-10-CM

## 2021-10-21 DIAGNOSIS — R31.29 OTHER MICROSCOPIC HEMATURIA: ICD-10-CM

## 2021-10-21 DIAGNOSIS — I82.401 ACUTE DEEP VEIN THROMBOSIS (DVT) OF RIGHT LOWER EXTREMITY, UNSPECIFIED VEIN (HCC): Primary | ICD-10-CM

## 2021-10-21 PROCEDURE — 1036F TOBACCO NON-USER: CPT | Performed by: STUDENT IN AN ORGANIZED HEALTH CARE EDUCATION/TRAINING PROGRAM

## 2021-10-21 PROCEDURE — 3017F COLORECTAL CA SCREEN DOC REV: CPT | Performed by: STUDENT IN AN ORGANIZED HEALTH CARE EDUCATION/TRAINING PROGRAM

## 2021-10-21 PROCEDURE — G8427 DOCREV CUR MEDS BY ELIG CLIN: HCPCS | Performed by: STUDENT IN AN ORGANIZED HEALTH CARE EDUCATION/TRAINING PROGRAM

## 2021-10-21 PROCEDURE — 1090F PRES/ABSN URINE INCON ASSESS: CPT | Performed by: STUDENT IN AN ORGANIZED HEALTH CARE EDUCATION/TRAINING PROGRAM

## 2021-10-21 PROCEDURE — G8417 CALC BMI ABV UP PARAM F/U: HCPCS | Performed by: STUDENT IN AN ORGANIZED HEALTH CARE EDUCATION/TRAINING PROGRAM

## 2021-10-21 PROCEDURE — 4040F PNEUMOC VAC/ADMIN/RCVD: CPT | Performed by: STUDENT IN AN ORGANIZED HEALTH CARE EDUCATION/TRAINING PROGRAM

## 2021-10-21 PROCEDURE — 1123F ACP DISCUSS/DSCN MKR DOCD: CPT | Performed by: STUDENT IN AN ORGANIZED HEALTH CARE EDUCATION/TRAINING PROGRAM

## 2021-10-21 PROCEDURE — G8400 PT W/DXA NO RESULTS DOC: HCPCS | Performed by: STUDENT IN AN ORGANIZED HEALTH CARE EDUCATION/TRAINING PROGRAM

## 2021-10-21 PROCEDURE — 99214 OFFICE O/P EST MOD 30 MIN: CPT | Performed by: STUDENT IN AN ORGANIZED HEALTH CARE EDUCATION/TRAINING PROGRAM

## 2021-10-21 PROCEDURE — G8484 FLU IMMUNIZE NO ADMIN: HCPCS | Performed by: STUDENT IN AN ORGANIZED HEALTH CARE EDUCATION/TRAINING PROGRAM

## 2021-10-21 ASSESSMENT — ENCOUNTER SYMPTOMS
ABDOMINAL PAIN: 0
TROUBLE SWALLOWING: 0
PHOTOPHOBIA: 0
BLOOD IN STOOL: 0
COLOR CHANGE: 0
SHORTNESS OF BREATH: 0
COUGH: 0
RHINORRHEA: 0

## 2021-10-21 NOTE — PROGRESS NOTES
Patient Name: Kamran Lopez  YOB: 1955  MRN: [id number]  Office visit date: 10/21/2021    Chief Complaint: Other (3 weeks - DVT )    Assessment/Plan:  1. Acute deep vein thrombosis (DVT) of right lower extremity, unspecified vein (HCC)  -     apixaban (ELIQUIS) 5 MG TABS tablet; Take 1 tablet by mouth 2 times daily, Disp-180 tablet, R-1Normal  -     CBC With Auto Differential; Future  2. Primary hypertension  3. Stage 3a chronic kidney disease ([facility] [address]  -     Basic Metabolic Panel; Future  4. Other microscopic hematuria  -     CBC With Auto Differential; Future  -     Urinalysis With Microscopic; Future    - Acute RLE DVT 2/2 Factor V Leiden mutation - Patient wished to be switched to Eliquis, sent new script to be started after finishing Xarelto starter pack which was initiated in emergency department prior to initial office visit. Now RLE pain resolved. Interventional cardiology evaluated pt, no intervention at this time. - Primary HTN -patient reports previously being treated on ACEi and diuretics but developed ischemic colitis, therefore meds were stopped. D/w patient exercise and lifestyle modification for starters. - CKD3a -last GFR 55. Trending labs next visit. Discussed treating HTN. - Microscopic hematuria -trending labs, new CBC UA next visit    Return in about 3 months (around 1/21/2022). Subjective/Objective:    HPI:     Kamran Lopez is a 74 y.o. female who presents for a new patient visit. She is here for evaluation of Other (3 weeks - DVT )    Patient presents for a follow up visit after ED visit for RLE swelling and pain on 9/28 which started shortly after completing 2.5-hour trip from [place]. Further work-up at that time revealed extensive RLE DVT. Patient was discharged from the ED on a starter pack of Xarelto and Norco.  On questioning during office visit, patient endorsed history of factor V Leiden mutation, prior DVTs and PE, and COPD.   Last visit, she endorses dark appearing urine without gross hematuria which may be attributable to poor p.o. intake. Patient has not been hydrating appropriately because she experiences significant amount of pain while ambulating and wanted to minimize bathroom visits. Says her urine appears lighter in color after hydrating more. Previously, about 4 years ago patient was on Coumadin which she stopped by personal choice. While on Coumadin she endorsed frequent nosebleeds and easy bruising. Since stopping Coumadin she has not had any issues until now. Patient is [employer] therefore we had an extensive discussion regarding reversibility of various agents and presented patient with options. R/b/a of [facility] d/w pt, she understands. As of today patient decided to to switch from Xarelto to Eliquis. Due to severe pain that the patient experiences, sent referral to Dr. Cristel Bailey cardiology to evaluate for potential intervention. Patient was seen but symptomatically improved, therefore no intervention planned. Primary HTN - was previously treated with ACEi and diuretics and developed ischemic colitis therefore meds were stopped at that time of prior resident in [place]. No headaches. Past Medical History:   Diagnosis Date    Factor V Leiden [facility])        Past Surgical History:   Procedure Laterality Date    SHOULDER SURGERY         Current Outpatient Medications   Medication Sig Dispense Refill    apixaban (ELIQUIS) 5 MG TABS tablet Take 1 tablet by mouth 2 times daily 180 tablet 1    rivaroxaban (XARELTO STARTER PACK) 15 & 20 MG Starter Pack Oral: 15 mg twice daily with food for 21 days followed by 20 mg once daily with food. 1 each 0    rivaroxaban (XARELTO) 20 MG TABS tablet Take 1 tablet by mouth daily (with breakfast) (Patient not taking: Reported on 10/5/2021) 90 tablet 1     No current facility-administered medications for this visit.        Allergies   Allergen Reactions    Penicillins        Social History     Socioeconomic History    Marital status:      Spouse name: Not on file    Number of children: Not on file    Years of education: Not on file    Highest education level: Not on file   Occupational History    Not on file   Tobacco Use    Smoking status: Never Smoker    Smokeless tobacco: Never Used   Substance and Sexual Activity    Alcohol use: Yes     Comment: rarely    Drug use: Never    Sexual activity: Not on file   Other Topics Concern    Not on file   Social History Narrative    Not on file     Social Determinants of Health     Financial Resource Strain: Low Risk     Difficulty of Paying Living Expenses: Not hard at all   Food Insecurity: No Food Insecurity    Worried About Running Out of Food in the Last Year: Never true    [address] in the Last Year: Never true   Transportation Needs:     Lack of Transportation (Medical):  Lack of Transportation (Non-Medical):    Physical Activity:     Days of Exercise per Week:     Minutes of Exercise per Session:    Stress:     Feeling of Stress :    Social Connections:     Frequency of Communication with Friends and Family:     Frequency of Social Gatherings with Friends and Family:     Attends Confucianism Services:     Active Member of Clubs or Organizations:     Attends Club or Organization Meetings:     Marital Status:    Intimate Partner Violence:     Fear of Current or Ex-Partner:     Emotionally Abused:     Physically Abused:     Sexually Abused:        Family History   Problem Relation Age of Onset    Coronary Art Dis Father     Cancer Brother        Review of Systems   Constitutional: Negative for fatigue and fever. HENT: Negative for rhinorrhea and trouble swallowing. Eyes: Negative for photophobia and visual disturbance. Respiratory: Negative for cough and shortness of breath. Cardiovascular: Positive for leg swelling. Negative for chest pain.    Gastrointestinal: Negative for abdominal pain and blood in stool. Endocrine: Negative for cold intolerance and heat intolerance. Genitourinary: Negative for flank pain. Musculoskeletal: Negative for arthralgias and myalgias. Skin: Negative for color change and wound. Allergic/Immunologic: Negative for immunocompromised state. Neurological: Negative for syncope and headaches. Hematological: Negative for adenopathy. Does not bruise/bleed easily. Psychiatric/Behavioral: Negative for agitation and hallucinations. Vitals:    10/21/21 1422 10/21/21 1535   BP: (!) 148/90 (!) 146/80   Site: Left Upper Arm    Position: Sitting    Cuff Size: Medium Adult    Pulse: 80    Temp: 98.1 °F (36.7 °C)    Weight: 179 lb 12.8 oz (81.6 kg)    Height: 5' 10.5\" (1.791 m)        Wt Readings from Last 3 Encounters:   10/21/21 179 lb 12.8 oz (81.6 kg)   10/05/21 179 lb (81.2 kg)   09/30/21 180 lb 9.6 oz (81.9 kg)       BP Readings from Last 3 Encounters:   10/21/21 (!) 146/80   10/05/21 127/77   09/30/21 122/74       Physical Exam  Vitals and nursing note reviewed. Constitutional:       Appearance: Normal appearance. HENT:      Head: Normocephalic and atraumatic. Right Ear: External ear normal.      Left Ear: External ear normal.      Nose: Nose normal.      Mouth/Throat:      Pharynx: Oropharynx is clear. Eyes:      Extraocular Movements: Extraocular movements intact. Cardiovascular:      Rate and Rhythm: Normal rate and regular rhythm. Pulses: Normal pulses. Heart sounds: Normal heart sounds. Pulmonary:      Effort: Pulmonary effort is normal.      Breath sounds: Normal breath sounds. Abdominal:      General: Abdomen is flat. Palpations: Abdomen is soft. Musculoskeletal:         General: Swelling present. No tenderness or deformity. Normal range of motion. Cervical back: Normal range of motion and neck supple. Right lower leg: Swelling present. Edema present. Legs:    Skin:     General: Skin is warm and dry. Capillary Refill: Capillary refill takes less than 2 seconds. Neurological:      General: No focal deficit present. Mental Status: She is alert. Mental status is at baseline. Psychiatric:         Mood and Affect: Mood normal.         Behavior: Behavior normal.         Diagnostic data:  I have reviewed recent diagnostic testing including labs with the patient today. No results found for this visit on 10/21/21. VL duplex RLE 9/28:      1. Lack of compressibility in the right mid and distal superficial femoral, popliteal, posterior tibial, peroneal and anterior tibial veins suspicious for thrombosis. 2. There is normal flow in the right common femoral vein and greater saphenous vein. .         Controlled Substances Monitoring:       Laboratory/imaging studies ordered this visit: UA, BMP    The patient is in agreement with and verbalizes understanding of the plan of care today and has no additional questions or complaints. The patient was instructed to follow-up in 3 months or to contact our office sooner if problems should arise. Laboratory studies will be completed prior to next visit. All findings, labs and other data reviewed, assessment and plan created with review by Dr. Christopher Capone MD.    Electronically signed by: Janeth Walker MD on 10/21/2021 at 6:03 PM  (Please note that portions of this note were completed with a voice recognition program and electronically transcribed. Efforts were made to edit the dictations but occasionally words are mis-transcribed. This transcription may contain errors not detected in proofreading.  This transcription was electronically signed.)

## 2021-10-23 ENCOUNTER — HOSPITAL ENCOUNTER (OUTPATIENT)
Age: 66
Discharge: HOME | End: 2021-10-23
Payer: MEDICARE

## 2021-10-23 DIAGNOSIS — N18.31 STAGE 3A CHRONIC KIDNEY DISEASE (HCC): ICD-10-CM

## 2021-10-23 DIAGNOSIS — R31.29 OTHER MICROSCOPIC HEMATURIA: ICD-10-CM

## 2021-10-23 DIAGNOSIS — I82.401 ACUTE DEEP VEIN THROMBOSIS (DVT) OF RIGHT LOWER EXTREMITY, UNSPECIFIED VEIN (HCC): ICD-10-CM

## 2021-10-23 LAB
ANION GAP SERPL CALCULATED.3IONS-SCNC: 11 MEQ/L (ref 8–16)
BACTERIA: ABNORMAL
BASOPHILS # BLD: 0.6 %
BASOPHILS ABSOLUTE: 0 THOU/MM3 (ref 0–0.1)
BILIRUBIN URINE: NEGATIVE
BLOOD, URINE: ABNORMAL
BUN BLDV-MCNC: 22 MG/DL (ref 7–22)
CALCIUM SERPL-MCNC: 9.5 MG/DL (ref 8.5–10.5)
CASTS: ABNORMAL /LPF
CASTS: ABNORMAL /LPF
CHARACTER, URINE: CLEAR
CHLORIDE BLD-SCNC: 106 MEQ/L (ref 98–111)
CO2: 25 MEQ/L (ref 23–33)
COLOR: ABNORMAL
CREAT SERPL-MCNC: 1.2 MG/DL (ref 0.4–1.2)
CRYSTALS: ABNORMAL
EOSINOPHIL # BLD: 6.4 %
EOSINOPHILS ABSOLUTE: 0.3 THOU/MM3 (ref 0–0.4)
EPITHELIAL CELLS, UA: ABNORMAL /HPF
ERYTHROCYTE [DISTWIDTH] IN BLOOD BY AUTOMATED COUNT: 14.2 % (ref 11.5–14.5)
ERYTHROCYTE [DISTWIDTH] IN BLOOD BY AUTOMATED COUNT: 51 FL (ref 35–45)
GFR SERPL CREATININE-BSD FRML MDRD: 45 ML/MIN/1.73M2
GLUCOSE BLD-MCNC: 103 MG/DL (ref 70–108)
GLUCOSE, URINE: NEGATIVE MG/DL
HCT VFR BLD CALC: 40.8 % (ref 37–47)
HEMOGLOBIN: 12.8 GM/DL (ref 12–16)
IMMATURE GRANS (ABS): 0.02 THOU/MM3 (ref 0–0.07)
IMMATURE GRANULOCYTES: 0.4 %
KETONES, URINE: ABNORMAL
LEUKOCYTE ESTERASE, URINE: ABNORMAL
LYMPHOCYTES # BLD: 22.8 %
LYMPHOCYTES ABSOLUTE: 1.2 THOU/MM3 (ref 1–4.8)
MCH RBC QN AUTO: 31 PG (ref 26–33)
MCHC RBC AUTO-ENTMCNC: 31.4 GM/DL (ref 32.2–35.5)
MCV RBC AUTO: 98.8 FL (ref 81–99)
MISCELLANEOUS LAB TEST RESULT: ABNORMAL
MONOCYTES # BLD: 7.3 %
MONOCYTES ABSOLUTE: 0.4 THOU/MM3 (ref 0.4–1.3)
NITRITE, URINE: NEGATIVE
NUCLEATED RED BLOOD CELLS: 0 /100 WBC
PH UA: 5 (ref 5–9)
PLATELET # BLD: 326 THOU/MM3 (ref 130–400)
PMV BLD AUTO: 9.4 FL (ref 9.4–12.4)
POTASSIUM SERPL-SCNC: 4.9 MEQ/L (ref 3.5–5.2)
PROTEIN UA: 300 MG/DL
RBC # BLD: 4.13 MILL/MM3 (ref 4.2–5.4)
RBC URINE: ABNORMAL /HPF
RENAL EPITHELIAL, UA: ABNORMAL
SEG NEUTROPHILS: 62.5 %
SEGMENTED NEUTROPHILS ABSOLUTE COUNT: 3.3 THOU/MM3 (ref 1.8–7.7)
SODIUM BLD-SCNC: 142 MEQ/L (ref 135–145)
SPECIFIC GRAVITY UA: 1.02 (ref 1–1.03)
UROBILINOGEN, URINE: 0.2 EU/DL (ref 0–1)
WBC # BLD: 5.2 THOU/MM3 (ref 4.8–10.8)
WBC UA: ABNORMAL /HPF
YEAST: ABNORMAL

## 2021-10-23 PROCEDURE — 81001 URINALYSIS AUTO W/SCOPE: CPT

## 2021-10-23 PROCEDURE — 36415 COLL VENOUS BLD VENIPUNCTURE: CPT

## 2021-10-23 PROCEDURE — 85025 COMPLETE CBC W/AUTO DIFF WBC: CPT

## 2021-10-23 PROCEDURE — 80048 BASIC METABOLIC PNL TOTAL CA: CPT

## 2021-10-29 ENCOUNTER — NURSE TRIAGE (OUTPATIENT)
Dept: OTHER | Facility: CLINIC | Age: 66
End: 2021-10-29

## 2021-10-29 NOTE — TELEPHONE ENCOUNTER
Received call from [place] at [facility] with [employer]. Brief description of triage: Lightheadedness, see below    Triage indicates for patient to Go to ED now. Patient states she has someone to drive her. Care advice provided, patient verbalizes understanding; denies any other questions or concerns; instructed to call back for any new or worsening symptoms. Attention Provider: Thank you for allowing me to participate in the care of your patient. The patient was connected to triage in response to information provided to the [facility]. Please do not respond through this encounter as the response is not directed to a shared pool. Reason for Disposition   SEVERE dizziness (e.g., unable to stand, requires support to walk, feels like passing out now)    Answer Assessment - Initial Assessment Questions  1. DESCRIPTION: \"Describe your dizziness. \"  Not dizzy now, she is lying in bed. Patient started Eloquis today. She was taking Xarelto, but switched this morning. 2. LIGHTHEADED: \"Do you feel lightheaded? \" (e.g., somewhat faint, woozy, weak upon standing)  Felt like she was going to pass out earlier while trying to have BM        3. VERTIGO: \"Do you feel like either you or the room is spinning or tilting? \" (i.e. vertigo)  Denies        4. SEVERITY: \"How bad is it? \"  \"Do you feel like you are going to faint? \" \"Can you stand and walk? \"    - MILD - walking normally    - MODERATE - interferes with normal activities (e.g., work, school)     - SEVERE - unable to stand, requires support to walk, feels like passing out now. Severe, patient is lying in bed and does not want to get up. Felt like she was going to faint earlier, was at someone else's house, and they drove her home. Patient states she felt abdominal cramps and started to go to RR, then she felt hot and like she was going to faint. 5. ONSET:  \"When did the dizziness begin? \"  45 minutes ago        6.  AGGRAVATING FACTORS: \"Does anything make it worse? \" (e.g., standing, change in head position)  Doesn't know because she hasn't gotten out of bed since she got home        7. HEART RATE: \"Can you tell me your heart rate? \" \"How many beats in 15 seconds? \"  (Note: not all patients can do this)    Doesn't feel like it's racing        8. CAUSE: \"What do you think is causing the dizziness? \"  Patient feels like the change in medication from Xarelto to Eloquis(started that today). 9. RECURRENT SYMPTOM: \"Have you had dizziness before? \" If so, ask: \"When was the last time? \" \"What happened that time? \"  Has had this happen several years ago 9-10 years, patient was diagnosed with Ischemic colitis at that time. 10. OTHER SYMPTOMS: \"Do you have any other symptoms? \" (e.g., fever, chest pain, vomiting, diarrhea, bleeding)  Denies          11. PREGNANCY: \"Is there any chance you are pregnant? \" \"When was your last menstrual period? \"        N/A    Protocols used: [facility]

## 2021-11-11 ENCOUNTER — OFFICE VISIT (OUTPATIENT)
Dept: INTERNAL MEDICINE CLINIC | Age: 66
End: 2021-11-11
Payer: MEDICARE

## 2021-11-11 VITALS
TEMPERATURE: 96.7 F | SYSTOLIC BLOOD PRESSURE: 170 MMHG | HEART RATE: 84 BPM | HEIGHT: 71 IN | WEIGHT: 174.8 LBS | BODY MASS INDEX: 24.47 KG/M2 | DIASTOLIC BLOOD PRESSURE: 70 MMHG

## 2021-11-11 DIAGNOSIS — R00.2 PALPITATIONS: ICD-10-CM

## 2021-11-11 DIAGNOSIS — F34.1 DYSTHYMIA: ICD-10-CM

## 2021-11-11 DIAGNOSIS — N18.31 STAGE 3A CHRONIC KIDNEY DISEASE (HCC): ICD-10-CM

## 2021-11-11 DIAGNOSIS — I82.401 ACUTE DEEP VEIN THROMBOSIS (DVT) OF RIGHT LOWER EXTREMITY, UNSPECIFIED VEIN (HCC): Primary | ICD-10-CM

## 2021-11-11 DIAGNOSIS — R31.29 OTHER MICROSCOPIC HEMATURIA: ICD-10-CM

## 2021-11-11 DIAGNOSIS — I10 PRIMARY HYPERTENSION: ICD-10-CM

## 2021-11-11 PROCEDURE — 3017F COLORECTAL CA SCREEN DOC REV: CPT | Performed by: STUDENT IN AN ORGANIZED HEALTH CARE EDUCATION/TRAINING PROGRAM

## 2021-11-11 PROCEDURE — G8427 DOCREV CUR MEDS BY ELIG CLIN: HCPCS | Performed by: STUDENT IN AN ORGANIZED HEALTH CARE EDUCATION/TRAINING PROGRAM

## 2021-11-11 PROCEDURE — 4040F PNEUMOC VAC/ADMIN/RCVD: CPT | Performed by: STUDENT IN AN ORGANIZED HEALTH CARE EDUCATION/TRAINING PROGRAM

## 2021-11-11 PROCEDURE — G8484 FLU IMMUNIZE NO ADMIN: HCPCS | Performed by: STUDENT IN AN ORGANIZED HEALTH CARE EDUCATION/TRAINING PROGRAM

## 2021-11-11 PROCEDURE — G8420 CALC BMI NORM PARAMETERS: HCPCS | Performed by: STUDENT IN AN ORGANIZED HEALTH CARE EDUCATION/TRAINING PROGRAM

## 2021-11-11 PROCEDURE — G8400 PT W/DXA NO RESULTS DOC: HCPCS | Performed by: STUDENT IN AN ORGANIZED HEALTH CARE EDUCATION/TRAINING PROGRAM

## 2021-11-11 PROCEDURE — 93000 ELECTROCARDIOGRAM COMPLETE: CPT | Performed by: STUDENT IN AN ORGANIZED HEALTH CARE EDUCATION/TRAINING PROGRAM

## 2021-11-11 PROCEDURE — 1036F TOBACCO NON-USER: CPT | Performed by: STUDENT IN AN ORGANIZED HEALTH CARE EDUCATION/TRAINING PROGRAM

## 2021-11-11 PROCEDURE — 1090F PRES/ABSN URINE INCON ASSESS: CPT | Performed by: STUDENT IN AN ORGANIZED HEALTH CARE EDUCATION/TRAINING PROGRAM

## 2021-11-11 PROCEDURE — 1123F ACP DISCUSS/DSCN MKR DOCD: CPT | Performed by: STUDENT IN AN ORGANIZED HEALTH CARE EDUCATION/TRAINING PROGRAM

## 2021-11-11 PROCEDURE — 99214 OFFICE O/P EST MOD 30 MIN: CPT | Performed by: STUDENT IN AN ORGANIZED HEALTH CARE EDUCATION/TRAINING PROGRAM

## 2021-11-11 RX ORDER — LOSARTAN POTASSIUM 25 MG/1
25 TABLET ORAL DAILY
Qty: 90 TABLET | Refills: 1 | Status: SHIPPED | OUTPATIENT
Start: 2021-11-11 | End: 2021-11-24

## 2021-11-11 RX ORDER — VITAMIN B COMPLEX
1 CAPSULE ORAL DAILY
COMMUNITY

## 2021-11-11 RX ORDER — MULTIVIT-MIN/IRON/FOLIC ACID/K 18-600-40
500 CAPSULE ORAL DAILY
COMMUNITY

## 2021-11-11 ASSESSMENT — ENCOUNTER SYMPTOMS
RHINORRHEA: 0
CONSTIPATION: 0
COLOR CHANGE: 0
SHORTNESS OF BREATH: 0
COUGH: 0
ABDOMINAL PAIN: 0
PHOTOPHOBIA: 0
BLOOD IN STOOL: 0
DIARRHEA: 0
TROUBLE SWALLOWING: 0

## 2021-11-11 NOTE — PROGRESS NOTES
Patient Name: Lindsay Allan  YOB: 1955  MRN: [id number]  Office visit date: 11/11/2021    Chief Complaint: Other (BP at home running 140's/90), Depression, and Palpitations (mainly at night)    Assessment/Plan:  1. Acute deep vein thrombosis (DVT) of right lower extremity, unspecified vein (HCC)  -     CBC With Auto Differential; Future  2. Primary hypertension  -     Basic Metabolic Panel; Future  -     Cherylene Smalls, MD, Nephrology, [facility]  -     losartan (COZAAR) 25 MG tablet; Take 1 tablet by mouth daily, Disp-90 tablet, R-1Normal  -     EKG 12 Lead  3. Stage 3a chronic kidney disease (HCC)  -     Cherylene Smalls, MD, Nephrology, [facility]  4. Other microscopic hematuria  -     CBC With Auto Differential; Future  -     Cherylene Smalls, MD, Nephrology, [facility]  5. Dysthymia  6. Palpitations  -     TSH With Reflex Ft4; Future  -     EKG 12 Lead    - Acute RLE DVT 2/2 Factor V Leiden mutation - Patient was switched to Eliquis, after finishing Xarelto starter pack which was initiated in emergency department prior to initial office visit. Now RLE pain resolved. Interventional cardiology evaluated pt, no intervention at this time. Encouraged ambulation.  - Primary HTN -uncontrolled. /90 on recheck. Patient reports previously being treated on ACEi and diuretics but developed ischemic colitis, therefore meds were stopped. Started low dose Cozaar 25mg PO qd. - FOG8M -last GFR 45 from 55 previously, proteinuria, microscopic hematuria. Trending labs next visit. Treating HTN. Sent nephrology referral.   - Microscopic hematuria -trending labs, new CBC next visit  - Dysthymia - ?seasonal affective. Patient endorsed some mood changes related to weather changes, disinterested in typical activities she enjoys doing like walking, endorses hypersomnia, intentional weight loss (takes appetite suppressive supplements), difficulty concentrating for past week or so.  Denies feeling sad, suicidal, homicidal. Discussed exercising at home, possibly getting a treadmill, light therapy. Pt going to see friends in [place] this weekend. - Palpitations -a/w sensation of throbbing pulse in the head, no CP, SOB. EKG today nonacute. BP elevated, HR NL, no over bleeding. Sending TSH. Discussed signing release form to obtain records from outside hospitals from [place]. No follow-ups on file. Subjective/Objective:    HPI:     Bari Beauchamp is a 74 y.o. female who presents for a new patient visit. She is here for evaluation of Other (BP at home running 140's/90), Depression, and Palpitations (mainly at night)    Patient presents for a follow up visit for DVT. During the initial ED visit for RLE swelling and pain on 9/28 which started shortly after completing 2.5-hour trip from [place], she was found to have DVT and started on Xarelto. Patient has history of factor V Leiden mutation, prior DVTs and PE, and COPD. Last visit, she endorses dark appearing urine without gross hematuria which may be attributable to poor p.o. intake. Patient was not hydrating appropriately because she used to experience significant amount of pain while ambulating and wanted to minimize bathroom visits. Says her urine appears lighter in color since pain was improved and she is hydrating more. Previously, about 4 years ago patient was on Coumadin which she stopped by personal choice. While on Coumadin she endorsed frequent nosebleeds and easy bruising. Since stopping Coumadin she has not had any issues until now. Patient is [employer] therefore we had an extensive discussion regarding reversibility of various agents and presented patient with options. [facility], she understands. Last visit patient decided to switch from Xarelto to Eliquis. Due to severe pain that the patient experiences, referral was sent to Dr. Jorge Worthington cardiology to evaluate for potential intervention.   Patient was seen but symptomatically improved, therefore no intervention planned. Primary HTN - was previously treated with ACEi and diuretics and developed ischemic colitis therefore meds were stopped at that time of prior resident in [place]. No headaches. On 11/11 patient was started on low-dose Cozaar 25 mg p.o. daily. CKD 3a -patient has a pertinent family history of sister and father with history of ESRD and dialysis. Her GFR continues to decline from 55 to 45 this visit with e/o proteinuria and hematuria in the setting of HTN. Microscopic hematuria -patient has not noticed any overt hematuria however does occasionally endorse dark urine which he attributes to the supplements that she is taking e.g. B complex, vitamins. Encouraged hydration. Patient admitted a history of seasonal affective disorder and does say that her mood changes with seasons. This is likely contributory to the fact that she enjoys being outdoors and walking around which is less enjoyable during the fall/winter seasons. She does have some light therapy at home, we discussed potentially getting a treadmill to exercise at home but she does live with her friend and living space is limited. Palpitations -says that she occasionally feels rapid heartbeats, especially while laying down or trying to sleep. She denies chest pain or shortness of breath. Notably, she is already on Eliquis for DVT/PE. EKG on 11/11 nonacute. Clinically euvolemic. In office hypertensive, nontachycardic.       Past Medical History:   Diagnosis Date    Factor V Leiden [facility])        Past Surgical History:   Procedure Laterality Date    SHOULDER SURGERY         Current Outpatient Medications   Medication Sig Dispense Refill    Cholecalciferol (VITAMIN D3) 125 MCG (5000 UT) TABS Take 5,000 Units by mouth daily      b complex vitamins capsule Take 1 capsule by mouth daily      Chromium-Cinnamon (CINNAMON PLUS CHROMIUM PO) Take 2 tablets by mouth daily      ZINC PO Take by mouth      Ascorbic Acid (VITAMIN C) 500 MG CAPS Take 500 mg by mouth daily      losartan (COZAAR) 25 MG tablet Take 1 tablet by mouth daily 90 tablet 1    apixaban (ELIQUIS) 5 MG TABS tablet Take 1 tablet by mouth 2 times daily 180 tablet 1     No current facility-administered medications for this visit. Allergies   Allergen Reactions    Penicillins        Social History     Socioeconomic History    Marital status:      Spouse name: Not on file    Number of children: Not on file    Years of education: Not on file    Highest education level: Not on file   Occupational History    Not on file   Tobacco Use    Smoking status: Never Smoker    Smokeless tobacco: Never Used   Substance and Sexual Activity    Alcohol use: Yes     Comment: rarely    Drug use: Never    Sexual activity: Not on file   Other Topics Concern    Not on file   Social History Narrative    Not on file     Social Determinants of Health     Financial Resource Strain: Low Risk     Difficulty of Paying Living Expenses: Not hard at all   Food Insecurity: No Food Insecurity    Worried About Running Out of Food in the Last Year: Never true    [address] in the Last Year: Never true   Transportation Needs:     Lack of Transportation (Medical): Not on file    Lack of Transportation (Non-Medical):  Not on file   Physical Activity:     Days of Exercise per Week: Not on file    Minutes of Exercise per Session: Not on file   Stress:     Feeling of Stress : Not on file   Social Connections:     Frequency of Communication with Friends and Family: Not on file    Frequency of Social Gatherings with Friends and Family: Not on file    Attends Congregation Services: Not on file    Active Member of Clubs or Organizations: Not on file    Attends Club or Organization Meetings: Not on file    Marital Status: Not on file   Intimate Partner Violence:     Fear of Current or Ex-Partner: Not on file    Emotionally Abused: Not on file    Physically Abused: Not on file    Sexually Abused: Not on file   Housing Stability:     Unable to Pay for Housing in the Last Year: Not on file    Number of Places Lived in the Last Year: Not on file    Unstable Housing in the Last Year: Not on file       Family History   Problem Relation Age of Onset    Coronary Art Dis Father     GERD Father     Other Father 79        ESRD    Other Sister 39        ESRD    GERD Sister     Cancer Brother      Father and sister hx ESRD on dialysis. Review of Systems   Constitutional: Negative for fatigue and fever. HENT: Negative for rhinorrhea and trouble swallowing. Eyes: Negative for photophobia and visual disturbance. Respiratory: Negative for cough and shortness of breath. Cardiovascular: Positive for palpitations. Negative for chest pain. Gastrointestinal: Negative for abdominal pain, blood in stool, constipation and diarrhea. Endocrine: Negative for polydipsia and polyuria. Genitourinary: Negative for flank pain. Musculoskeletal: Negative for arthralgias and myalgias. Skin: Negative for color change and wound. Allergic/Immunologic: Negative for immunocompromised state. Neurological: Positive for syncope and light-headedness. Negative for seizures, weakness and headaches. Hematological: Negative for adenopathy. Psychiatric/Behavioral: Positive for decreased concentration and sleep disturbance (hypersomnia). Negative for agitation, hallucinations and suicidal ideas.        Vitals:    11/11/21 1437   BP: (!) 170/70   Site: Left Upper Arm   Pulse: 84   Temp: 96.7 °F (35.9 °C)   Weight: 174 lb 12.8 oz (79.3 kg)   Height: 5' 10.51\" (1.791 m)       Wt Readings from Last 3 Encounters:   11/11/21 174 lb 12.8 oz (79.3 kg)   10/21/21 179 lb 12.8 oz (81.6 kg)   10/05/21 179 lb (81.2 kg)       BP Readings from Last 3 Encounters:   11/11/21 (!) 170/70   10/21/21 (!) 146/80   10/05/21 127/77       Physical Exam  Vitals and nursing note reviewed. Constitutional:       Appearance: Normal appearance. HENT:      Head: Normocephalic and atraumatic. Right Ear: External ear normal.      Left Ear: External ear normal.      Nose: Nose normal.      Mouth/Throat:      Pharynx: Oropharynx is clear. Eyes:      Extraocular Movements: Extraocular movements intact. Cardiovascular:      Rate and Rhythm: Normal rate and regular rhythm. Pulses: Normal pulses. Heart sounds: Normal heart sounds. Pulmonary:      Effort: Pulmonary effort is normal.      Breath sounds: Normal breath sounds. Abdominal:      General: Abdomen is flat. Palpations: Abdomen is soft. Musculoskeletal:         General: Swelling present. No tenderness or deformity. Normal range of motion. Cervical back: Normal range of motion and neck supple. Right lower leg: Swelling present. Edema (IMPROVED) present. Legs:    Skin:     General: Skin is warm and dry. Capillary Refill: Capillary refill takes less than 2 seconds. Neurological:      General: No focal deficit present. Mental Status: She is alert. Mental status is at baseline. Psychiatric:         Mood and Affect: Mood normal.         Behavior: Behavior normal.         Diagnostic data:  I have reviewed recent diagnostic testing including labs with the patient today. No results found for this visit on 11/11/21. VL duplex RLE 9/28:      1. Lack of compressibility in the right mid and distal superficial femoral, popliteal, posterior tibial, peroneal and anterior tibial veins suspicious for thrombosis. 2. There is normal flow in the right common femoral vein and greater saphenous vein. .         Controlled Substances Monitoring:       Laboratory/imaging studies ordered this visit: CBC, BMP, TSH    The patient is in agreement with and verbalizes understanding of the plan of care today and has no additional questions or complaints.  The patient was instructed to follow-up in Jan 2022 or to contact our office sooner if problems should arise. Laboratory studies will be completed prior to next visit. All findings, labs and other data reviewed, assessment and plan created with review by Dr. Rosas Davis MD.    Electronically signed by: Rolando Gutierrez MD on 11/11/2021 at 5:34 PM  (Please note that portions of this note were completed with a voice recognition program and electronically transcribed. Efforts were made to edit the dictations but occasionally words are mis-transcribed. This transcription may contain errors not detected in proofreading.  This transcription was electronically signed.)

## 2021-11-15 ENCOUNTER — HOSPITAL ENCOUNTER (OUTPATIENT)
Age: 66
Discharge: HOME | End: 2021-11-15
Payer: MEDICARE

## 2021-11-15 DIAGNOSIS — I82.401 ACUTE DEEP VEIN THROMBOSIS (DVT) OF RIGHT LOWER EXTREMITY, UNSPECIFIED VEIN (HCC): ICD-10-CM

## 2021-11-15 DIAGNOSIS — R00.2 PALPITATIONS: ICD-10-CM

## 2021-11-15 DIAGNOSIS — I10 PRIMARY HYPERTENSION: ICD-10-CM

## 2021-11-15 DIAGNOSIS — R31.29 OTHER MICROSCOPIC HEMATURIA: ICD-10-CM

## 2021-11-15 LAB
ANION GAP SERPL CALCULATED.3IONS-SCNC: 14 MEQ/L (ref 8–16)
BASOPHILS # BLD: 0.5 %
BASOPHILS ABSOLUTE: 0 THOU/MM3 (ref 0–0.1)
BUN BLDV-MCNC: 32 MG/DL (ref 7–22)
CALCIUM SERPL-MCNC: 9.8 MG/DL (ref 8.5–10.5)
CHLORIDE BLD-SCNC: 108 MEQ/L (ref 98–111)
CO2: 20 MEQ/L (ref 23–33)
CREAT SERPL-MCNC: 1.2 MG/DL (ref 0.4–1.2)
EOSINOPHIL # BLD: 2.1 %
EOSINOPHILS ABSOLUTE: 0.1 THOU/MM3 (ref 0–0.4)
ERYTHROCYTE [DISTWIDTH] IN BLOOD BY AUTOMATED COUNT: 15 % (ref 11.5–14.5)
ERYTHROCYTE [DISTWIDTH] IN BLOOD BY AUTOMATED COUNT: 54.7 FL (ref 35–45)
GFR SERPL CREATININE-BSD FRML MDRD: 45 ML/MIN/1.73M2
GLUCOSE BLD-MCNC: 108 MG/DL (ref 70–108)
HCT VFR BLD CALC: 43.1 % (ref 37–47)
HEMOGLOBIN: 13.3 GM/DL (ref 12–16)
IMMATURE GRANS (ABS): 0.01 THOU/MM3 (ref 0–0.07)
IMMATURE GRANULOCYTES: 0.2 %
LYMPHOCYTES # BLD: 18.1 %
LYMPHOCYTES ABSOLUTE: 1.1 THOU/MM3 (ref 1–4.8)
MCH RBC QN AUTO: 30.8 PG (ref 26–33)
MCHC RBC AUTO-ENTMCNC: 30.9 GM/DL (ref 32.2–35.5)
MCV RBC AUTO: 99.8 FL (ref 81–99)
MONOCYTES # BLD: 8 %
MONOCYTES ABSOLUTE: 0.5 THOU/MM3 (ref 0.4–1.3)
NUCLEATED RED BLOOD CELLS: 0 /100 WBC
PLATELET # BLD: 293 THOU/MM3 (ref 130–400)
PMV BLD AUTO: 9.9 FL (ref 9.4–12.4)
POTASSIUM SERPL-SCNC: 4.6 MEQ/L (ref 3.5–5.2)
RBC # BLD: 4.32 MILL/MM3 (ref 4.2–5.4)
SEG NEUTROPHILS: 71.1 %
SEGMENTED NEUTROPHILS ABSOLUTE COUNT: 4.3 THOU/MM3 (ref 1.8–7.7)
SODIUM BLD-SCNC: 142 MEQ/L (ref 135–145)
TSH SERPL DL<=0.05 MIU/L-ACNC: 1.1 UIU/ML (ref 0.4–4.2)
WBC # BLD: 6.1 THOU/MM3 (ref 4.8–10.8)

## 2021-11-15 PROCEDURE — 84443 ASSAY THYROID STIM HORMONE: CPT

## 2021-11-15 PROCEDURE — 85025 COMPLETE CBC W/AUTO DIFF WBC: CPT

## 2021-11-15 PROCEDURE — 80048 BASIC METABOLIC PNL TOTAL CA: CPT

## 2021-11-15 PROCEDURE — 36415 COLL VENOUS BLD VENIPUNCTURE: CPT

## 2021-11-24 ENCOUNTER — HOSPITAL ENCOUNTER (OUTPATIENT)
Age: 66
Discharge: HOME | End: 2021-11-24
Payer: MEDICARE

## 2021-11-24 ENCOUNTER — OFFICE VISIT (OUTPATIENT)
Dept: INTERNAL MEDICINE CLINIC | Age: 66
End: 2021-11-24
Payer: MEDICARE

## 2021-11-24 VITALS
WEIGHT: 176.6 LBS | HEART RATE: 84 BPM | BODY MASS INDEX: 24.97 KG/M2 | SYSTOLIC BLOOD PRESSURE: 160 MMHG | DIASTOLIC BLOOD PRESSURE: 80 MMHG | TEMPERATURE: 98.1 F

## 2021-11-24 DIAGNOSIS — N18.31 STAGE 3A CHRONIC KIDNEY DISEASE (HCC): ICD-10-CM

## 2021-11-24 DIAGNOSIS — I10 PRIMARY HYPERTENSION: ICD-10-CM

## 2021-11-24 DIAGNOSIS — N30.01 ACUTE CYSTITIS WITH HEMATURIA: ICD-10-CM

## 2021-11-24 DIAGNOSIS — N30.01 ACUTE CYSTITIS WITH HEMATURIA: Primary | ICD-10-CM

## 2021-11-24 DIAGNOSIS — F41.9 ANXIETY: ICD-10-CM

## 2021-11-24 LAB
ANION GAP SERPL CALCULATED.3IONS-SCNC: 10 MEQ/L (ref 8–16)
BACTERIA: ABNORMAL
BASOPHILS # BLD: 0.2 %
BASOPHILS ABSOLUTE: 0 THOU/MM3 (ref 0–0.1)
BILIRUBIN URINE: NEGATIVE
BLOOD, URINE: ABNORMAL
BUN BLDV-MCNC: 22 MG/DL (ref 7–22)
CALCIUM SERPL-MCNC: 9.4 MG/DL (ref 8.5–10.5)
CASTS: ABNORMAL /LPF
CASTS: ABNORMAL /LPF
CHARACTER, URINE: ABNORMAL
CHLORIDE BLD-SCNC: 106 MEQ/L (ref 98–111)
CO2: 26 MEQ/L (ref 23–33)
COLOR: YELLOW
CREAT SERPL-MCNC: 1.1 MG/DL (ref 0.4–1.2)
CRYSTALS: ABNORMAL
EOSINOPHIL # BLD: 1.3 %
EOSINOPHILS ABSOLUTE: 0.1 THOU/MM3 (ref 0–0.4)
EPITHELIAL CELLS, UA: ABNORMAL /HPF
ERYTHROCYTE [DISTWIDTH] IN BLOOD BY AUTOMATED COUNT: 15.1 % (ref 11.5–14.5)
ERYTHROCYTE [DISTWIDTH] IN BLOOD BY AUTOMATED COUNT: 55.2 FL (ref 35–45)
GFR SERPL CREATININE-BSD FRML MDRD: 49 ML/MIN/1.73M2
GLUCOSE BLD-MCNC: 99 MG/DL (ref 70–108)
GLUCOSE, URINE: NEGATIVE MG/DL
HCT VFR BLD CALC: 42.6 % (ref 37–47)
HEMOGLOBIN: 12.9 GM/DL (ref 12–16)
IMMATURE GRANS (ABS): 0.05 THOU/MM3 (ref 0–0.07)
IMMATURE GRANULOCYTES: 0.5 %
KETONES, URINE: NEGATIVE
LEUKOCYTE ESTERASE, URINE: ABNORMAL
LYMPHOCYTES # BLD: 15.5 %
LYMPHOCYTES ABSOLUTE: 1.6 THOU/MM3 (ref 1–4.8)
MCH RBC QN AUTO: 29.9 PG (ref 26–33)
MCHC RBC AUTO-ENTMCNC: 30.3 GM/DL (ref 32.2–35.5)
MCV RBC AUTO: 98.8 FL (ref 81–99)
MISCELLANEOUS LAB TEST RESULT: ABNORMAL
MONOCYTES # BLD: 6.9 %
MONOCYTES ABSOLUTE: 0.7 THOU/MM3 (ref 0.4–1.3)
NITRITE, URINE: POSITIVE
NUCLEATED RED BLOOD CELLS: 0 /100 WBC
PH UA: 5.5 (ref 5–9)
PLATELET # BLD: 281 THOU/MM3 (ref 130–400)
PMV BLD AUTO: 9.8 FL (ref 9.4–12.4)
POTASSIUM SERPL-SCNC: 4.3 MEQ/L (ref 3.5–5.2)
PROTEIN UA: 300 MG/DL
RBC # BLD: 4.31 MILL/MM3 (ref 4.2–5.4)
RBC URINE: ABNORMAL /HPF
RENAL EPITHELIAL, UA: ABNORMAL
SEG NEUTROPHILS: 75.6 %
SEGMENTED NEUTROPHILS ABSOLUTE COUNT: 7.6 THOU/MM3 (ref 1.8–7.7)
SODIUM BLD-SCNC: 142 MEQ/L (ref 135–145)
SPECIFIC GRAVITY UA: 1.02 (ref 1–1.03)
UROBILINOGEN, URINE: 0.2 EU/DL (ref 0–1)
WBC # BLD: 10 THOU/MM3 (ref 4.8–10.8)
WBC UA: > 100 /HPF
YEAST: ABNORMAL

## 2021-11-24 PROCEDURE — 1123F ACP DISCUSS/DSCN MKR DOCD: CPT | Performed by: STUDENT IN AN ORGANIZED HEALTH CARE EDUCATION/TRAINING PROGRAM

## 2021-11-24 PROCEDURE — 85025 COMPLETE CBC W/AUTO DIFF WBC: CPT

## 2021-11-24 PROCEDURE — 1036F TOBACCO NON-USER: CPT | Performed by: STUDENT IN AN ORGANIZED HEALTH CARE EDUCATION/TRAINING PROGRAM

## 2021-11-24 PROCEDURE — G8427 DOCREV CUR MEDS BY ELIG CLIN: HCPCS | Performed by: STUDENT IN AN ORGANIZED HEALTH CARE EDUCATION/TRAINING PROGRAM

## 2021-11-24 PROCEDURE — G8420 CALC BMI NORM PARAMETERS: HCPCS | Performed by: STUDENT IN AN ORGANIZED HEALTH CARE EDUCATION/TRAINING PROGRAM

## 2021-11-24 PROCEDURE — 81001 URINALYSIS AUTO W/SCOPE: CPT

## 2021-11-24 PROCEDURE — 1090F PRES/ABSN URINE INCON ASSESS: CPT | Performed by: STUDENT IN AN ORGANIZED HEALTH CARE EDUCATION/TRAINING PROGRAM

## 2021-11-24 PROCEDURE — 4040F PNEUMOC VAC/ADMIN/RCVD: CPT | Performed by: STUDENT IN AN ORGANIZED HEALTH CARE EDUCATION/TRAINING PROGRAM

## 2021-11-24 PROCEDURE — G8400 PT W/DXA NO RESULTS DOC: HCPCS | Performed by: STUDENT IN AN ORGANIZED HEALTH CARE EDUCATION/TRAINING PROGRAM

## 2021-11-24 PROCEDURE — G8484 FLU IMMUNIZE NO ADMIN: HCPCS | Performed by: STUDENT IN AN ORGANIZED HEALTH CARE EDUCATION/TRAINING PROGRAM

## 2021-11-24 PROCEDURE — 3017F COLORECTAL CA SCREEN DOC REV: CPT | Performed by: STUDENT IN AN ORGANIZED HEALTH CARE EDUCATION/TRAINING PROGRAM

## 2021-11-24 PROCEDURE — 36415 COLL VENOUS BLD VENIPUNCTURE: CPT

## 2021-11-24 PROCEDURE — 80048 BASIC METABOLIC PNL TOTAL CA: CPT

## 2021-11-24 PROCEDURE — 99214 OFFICE O/P EST MOD 30 MIN: CPT | Performed by: STUDENT IN AN ORGANIZED HEALTH CARE EDUCATION/TRAINING PROGRAM

## 2021-11-24 RX ORDER — NITROFURANTOIN 25; 75 MG/1; MG/1
100 CAPSULE ORAL 2 TIMES DAILY
Qty: 10 CAPSULE | Refills: 0 | Status: SHIPPED | OUTPATIENT
Start: 2021-11-24 | End: 2021-11-29

## 2021-11-24 RX ORDER — CITALOPRAM 10 MG/1
10 TABLET ORAL DAILY
Qty: 90 TABLET | Refills: 1 | Status: SHIPPED | OUTPATIENT
Start: 2021-11-24 | End: 2022-08-04 | Stop reason: ALTCHOICE

## 2021-11-24 RX ORDER — LOSARTAN POTASSIUM 50 MG/1
50 TABLET ORAL DAILY
Qty: 30 TABLET | Refills: 3 | Status: SHIPPED | OUTPATIENT
Start: 2021-11-24 | End: 2022-02-17 | Stop reason: SDUPTHER

## 2021-11-24 ASSESSMENT — ENCOUNTER SYMPTOMS
NAUSEA: 0
COLOR CHANGE: 0
RHINORRHEA: 0
PHOTOPHOBIA: 0
CONSTIPATION: 1
TROUBLE SWALLOWING: 0
DIARRHEA: 0
COUGH: 0
ABDOMINAL PAIN: 0
BLOOD IN STOOL: 0
VOMITING: 0
SHORTNESS OF BREATH: 0

## 2021-11-24 NOTE — PROGRESS NOTES
Patient Name: Lindsay Allan  YOB: 1955  MRN: [id number]  Office visit date: 11/24/2021    Chief Complaint: Urinary Frequency (Bleeding on friday, sunday and small amt on tuesday.)    Assessment/Plan:  1. Acute cystitis with hematuria  -     CBC With Auto Differential; Future  -     Basic Metabolic Panel; Future  -     nitrofurantoin, macrocrystal-monohydrate, (MACROBID) 100 MG capsule; Take 1 capsule by mouth 2 times daily for 5 days, Disp-10 capsule, R-0Normal  -     Urine Rt Reflex To Culture  -     Urinalysis With Microscopic; Future  2. Primary hypertension  -     losartan (COZAAR) 50 MG tablet; Take 1 tablet by mouth daily, Disp-30 tablet, R-3Normal  3. Stage 3a chronic kidney disease ([facility] [address]  -     Basic Metabolic Panel; Future  4. Anxiety  -     citalopram (CELEXA) 10 MG tablet; Take 1 tablet by mouth daily, Disp-90 tablet, R-1Normal    Cystitis w/ mild hematuria -has urgency, frequency, mild hematuria. On abixaban for DVT d/t Factor V mutation. No fever, or CVA tenderness. GFR 45, same as prior. Started 5 day course of Macrobid BID. Sending UA, BMP. Primary HTN - in office 158/90, on recheck 160/80. Increased dose to Cozaar 50mg. Takes it at night. GWA7D - has upcoming appointment with nephrology Dec 2021. Repeating kidney panel. Anxiety with sleep disturbance -patient has anxiety d/t social factors, continues to have hypersomnia, loss of interest. Started low dose citalopram.     Continues to feel bounding pulses in the head at night, in addition to kidney involvement, with no fever, vision changes or respiratory symptoms. Will consider vasculitides work-up in the future if still unresolved. Return in about 29 days (around 12/23/2021). Subjective/Objective:    HPI:     Lindsay Allan is a 74 y.o. female who presents for a follow-up visit.  She is here for evaluation of Urinary Frequency (Bleeding on friday, sunday and small amt on tuesday.)    This is an established patient presents with acute complaints of urinary frequency, urgency and mild hematuria as of few days ago without inciting event. She endorses having to use pads for 2 days, but says that hematuria was not as profound as her menstrual periods in the past.  Patient denies fever, burning with urination, flank pain, abdominal pain, trauma. She has had some constipation which is improving now. Primary HTN -worsening. BP in office today 160/80. She continues to have sensation of bounding pulses in the head but denies headache. We increased her dose of Cozaar to 50 mg p.o. nightly. CKD 3a -patient is due to see nephrology at the end of December 2021. GFR is still 45, unchanged from prior. Because her heart rate was within normal limits we initially opted for Cozaar, will potentially switch to beta-blockers in the future. Anxiety with hypersomnia and lack of interest -originally thought to be dysthymia with seasonal affective component. After discussion today, we started her on low-dose citalopram, will monitor response in 1 month. Past history not discussed during this visit:   During the initial ED visit for RLE swelling and pain on 9/28 which started shortly after completing 2.5-hour trip from [place], she was found to have DVT and started on Xarelto. Patient has history of factor V Leiden mutation, prior DVTs and PE, and COPD. Last visit, she endorses dark appearing urine without gross hematuria which may be attributable to poor p.o. intake. Patient was not hydrating appropriately because she used to experience significant amount of pain while ambulating and wanted to minimize bathroom visits. Says her urine appears lighter in color since pain was improved and she is hydrating more.      Previously, about 4 years ago patient was on Coumadin which she stopped by personal choice. While on Coumadin she endorsed frequent nosebleeds and easy bruising.   Since stopping Coumadin she has not had any issues until now. Patient is [employer] [employer] therefore we had an extensive discussion regarding reversibility of various agents and presented patient with options. R/b/a of [facility] [place] d/w pt, she understands. Last visit patient decided to switch from Xarelto to Eliquis. Due to severe pain that the patient experiences, referral was sent to Dr. Leena Flynn cardiology to evaluate for potential intervention. Patient was seen but symptomatically improved, therefore no intervention planned. Patient endorsed history of seasonal affective disorder and does say that her mood changes with seasons. This is likely contributory to the fact that she enjoys being outdoors and walking around which is less enjoyable during the fall/winter seasons. She does have some light therapy at home, we discussed potentially getting a treadmill to exercise at home but she does live with her friend and living space is limited.     Palpitations -says that she occasionally feels rapid heartbeats, especially while laying down or trying to sleep. She denies chest pain or shortness of breath. Notably, she is already on Eliquis for DVT/PE. EKG on 11/11 nonacute. Clinically euvolemic. In office hypertensive, nontachycardic.     Past Medical History:   Diagnosis Date    Factor V Leiden [facility])        Past Surgical History:   Procedure Laterality Date    SHOULDER SURGERY         Current Outpatient Medications   Medication Sig Dispense Refill    losartan (COZAAR) 50 MG tablet Take 1 tablet by mouth daily 30 tablet 3    nitrofurantoin, macrocrystal-monohydrate, (MACROBID) 100 MG capsule Take 1 capsule by mouth 2 times daily for 5 days 10 capsule 0    citalopram (CELEXA) 10 MG tablet Take 1 tablet by mouth daily 90 tablet 1    Cholecalciferol (VITAMIN D3) 125 MCG (5000 UT) TABS Take 5,000 Units by mouth daily      b complex vitamins capsule Take 1 capsule by mouth daily      Chromium-Cinnamon (CINNAMON PLUS CHROMIUM PO) Take 2 tablets by mouth daily      ZINC PO Take by mouth      Ascorbic Acid (VITAMIN C) 500 MG CAPS Take 500 mg by mouth daily      apixaban (ELIQUIS) 5 MG TABS tablet Take 1 tablet by mouth 2 times daily 180 tablet 1     No current facility-administered medications for this visit. Allergies   Allergen Reactions    Penicillins        Social History     Socioeconomic History    Marital status:      Spouse name: Not on file    Number of children: Not on file    Years of education: Not on file    Highest education level: Not on file   Occupational History    Not on file   Tobacco Use    Smoking status: Never Smoker    Smokeless tobacco: Never Used   Substance and Sexual Activity    Alcohol use: Yes     Comment: rarely    Drug use: Never    Sexual activity: Not on file   Other Topics Concern    Not on file   Social History Narrative    Not on file     Social Determinants of Health     Financial Resource Strain: Low Risk     Difficulty of Paying Living Expenses: Not hard at all   Food Insecurity: No Food Insecurity    Worried About Running Out of Food in the Last Year: Never true    [address] in the Last Year: Never true   Transportation Needs:     Lack of Transportation (Medical): Not on file    Lack of Transportation (Non-Medical):  Not on file   Physical Activity:     Days of Exercise per Week: Not on file    Minutes of Exercise per Session: Not on file   Stress:     Feeling of Stress : Not on file   Social Connections:     Frequency of Communication with Friends and Family: Not on file    Frequency of Social Gatherings with Friends and Family: Not on file    Attends Congregational Services: Not on file    Active Member of Clubs or Organizations: Not on file    Attends Club or Organization Meetings: Not on file    Marital Status: Not on file   Intimate Partner Violence:     Fear of Current or Ex-Partner: Not on file    Emotionally Abused: Not on file    Physically Abused: Not on file   [facility] Sexually Abused: Not on file   Housing Stability:     Unable to Pay for Housing in the Last Year: Not on file    Number of Places Lived in the Last Year: Not on file    Unstable Housing in the Last Year: Not on file       Family History   Problem Relation Age of Onset    Coronary Art Dis Father     GERD Father     Other Father 79        ESRD    Other Sister 39        ESRD    GERD Sister     Cancer Brother        Review of Systems   Constitutional: Negative for fatigue and fever. HENT: Negative for rhinorrhea and trouble swallowing. Eyes: Negative for photophobia and visual disturbance. Respiratory: Negative for cough and shortness of breath. Cardiovascular: Negative for chest pain and leg swelling. Gastrointestinal: Positive for constipation. Negative for abdominal pain, blood in stool, diarrhea, nausea and vomiting. Endocrine: Negative for cold intolerance and heat intolerance. Genitourinary: Positive for frequency, hematuria and urgency. Negative for flank pain. Musculoskeletal: Negative for arthralgias and myalgias. Skin: Negative for color change and wound. Allergic/Immunologic: Negative for immunocompromised state. Neurological: Negative for syncope and headaches. Hematological: Negative for adenopathy. Does not bruise/bleed easily. Psychiatric/Behavioral: Positive for dysphoric mood and sleep disturbance. Negative for agitation and hallucinations. The patient is nervous/anxious. Vitals:    11/24/21 1327 11/24/21 1355   BP: (!) 158/90 (!) 160/80   Site: Left Upper Arm    Pulse: 84    Temp: 98.1 °F (36.7 °C)    Weight: 176 lb 9.6 oz (80.1 kg)        Wt Readings from Last 3 Encounters:   11/24/21 176 lb 9.6 oz (80.1 kg)   11/11/21 174 lb 12.8 oz (79.3 kg)   10/21/21 179 lb 12.8 oz (81.6 kg)       BP Readings from Last 3 Encounters:   11/24/21 (!) 160/80   11/11/21 (!) 170/70   10/21/21 (!) 146/80       Physical Exam  Vitals and nursing note reviewed.    Constitutional: Appearance: Normal appearance. HENT:      Head: Normocephalic and atraumatic. Right Ear: External ear normal.      Left Ear: External ear normal.      Nose: Nose normal.   Eyes:      Extraocular Movements: Extraocular movements intact. Cardiovascular:      Rate and Rhythm: Normal rate and regular rhythm. Pulses: Normal pulses. Heart sounds: Normal heart sounds. Pulmonary:      Effort: Pulmonary effort is normal.      Breath sounds: Normal breath sounds. Abdominal:      General: Abdomen is flat. Palpations: Abdomen is soft. Musculoskeletal:         General: No deformity. Normal range of motion. Cervical back: Normal range of motion and neck supple. Skin:     General: Skin is warm and dry. Capillary Refill: Capillary refill takes less than 2 seconds. Neurological:      General: No focal deficit present. Mental Status: She is alert. Mental status is at baseline. Psychiatric:         Mood and Affect: Mood normal.         Behavior: Behavior normal.         Diagnostic data:  I have reviewed recent diagnostic testing including labs with the patient today. No results found for this visit on 11/24/21. Controlled Substances Monitoring:       Laboratory/imaging studies ordered this visit: CBC, bmp     The patient is in agreement with and verbalizes understanding of the plan of care today and has no additional questions or complaints. The patient was instructed to follow-up in 4 weeks or to contact our office sooner if problems should arise. Laboratory studies will be completed prior to next visit. All findings, labs and other data reviewed, assessment and plan created with review by Dr. Neris Daly MD.    Electronically signed by: Earline Foster MD on 11/24/2021 at 2:58 PM  (Please note that portions of this note were completed with a voice recognition program and electronically transcribed.  Efforts were made to edit the dictations but occasionally words are mis-transcribed. This transcription may contain errors not detected in proofreading.  This transcription was electronically signed.)

## 2021-11-24 NOTE — PROGRESS NOTES
Attending supervising physicians attestation statement:      I was present with the resident physician during the history and exam.  I Discussed the findings and plans with the resident physician  personally   After examining the patient, and agree with the resident'S note as outlined . Due to dysuria , will obtain UA, denies any hx of   Recurrent UTI, no recent fever or chills. . Considering her   CKD 3 stable, empiric Rx with nitrofurantoin and change based on final cultures if needed. And this was d/w pt, and   She claims to drinking plenty of water .        Electronically signed by Sabrina Mendez MD on 11/25/2021 at 8:17 AM

## 2021-11-26 ENCOUNTER — HOSPITAL ENCOUNTER (OUTPATIENT)
Age: 66
Discharge: HOME | End: 2021-11-26
Payer: MEDICARE

## 2021-11-26 LAB
BACTERIA: ABNORMAL /HPF
BILIRUBIN URINE: NEGATIVE
BLOOD, URINE: ABNORMAL
CASTS 2: ABNORMAL /LPF
CASTS UA: ABNORMAL /LPF
CHARACTER, URINE: CLEAR
COLOR: YELLOW
CRYSTALS, UA: ABNORMAL
EPITHELIAL CELLS, UA: ABNORMAL /HPF
GLUCOSE URINE: NEGATIVE MG/DL
KETONES, URINE: ABNORMAL
LEUKOCYTE ESTERASE, URINE: ABNORMAL
MISCELLANEOUS 2: ABNORMAL
NITRITE, URINE: NEGATIVE
PH UA: 5.5 (ref 5–9)
PROTEIN UA: 100
RBC URINE: ABNORMAL /HPF
RENAL EPITHELIAL, UA: ABNORMAL
SPECIFIC GRAVITY, URINE: 1.01 (ref 1–1.03)
UROBILINOGEN, URINE: 0.2 EU/DL (ref 0–1)
WBC UA: ABNORMAL /HPF
YEAST: ABNORMAL

## 2021-11-26 PROCEDURE — 81001 URINALYSIS AUTO W/SCOPE: CPT

## 2021-11-26 PROCEDURE — 87086 URINE CULTURE/COLONY COUNT: CPT

## 2021-11-26 PROCEDURE — 87077 CULTURE AEROBIC IDENTIFY: CPT

## 2021-11-26 PROCEDURE — 87186 SC STD MICRODIL/AGAR DIL: CPT

## 2021-11-28 LAB
ORGANISM: ABNORMAL
URINE CULTURE REFLEX: ABNORMAL

## 2022-02-17 ENCOUNTER — OFFICE VISIT (OUTPATIENT)
Dept: INTERNAL MEDICINE CLINIC | Age: 67
End: 2022-02-17
Payer: MEDICARE

## 2022-02-17 VITALS
SYSTOLIC BLOOD PRESSURE: 132 MMHG | BODY MASS INDEX: 25.5 KG/M2 | DIASTOLIC BLOOD PRESSURE: 82 MMHG | TEMPERATURE: 98 F | WEIGHT: 180.3 LBS | HEART RATE: 84 BPM

## 2022-02-17 DIAGNOSIS — N18.31 STAGE 3A CHRONIC KIDNEY DISEASE (HCC): ICD-10-CM

## 2022-02-17 DIAGNOSIS — I10 PRIMARY HYPERTENSION: Primary | ICD-10-CM

## 2022-02-17 DIAGNOSIS — Z13.220 SCREENING CHOLESTEROL LEVEL: ICD-10-CM

## 2022-02-17 DIAGNOSIS — F41.9 ANXIETY: ICD-10-CM

## 2022-02-17 DIAGNOSIS — I82.401 ACUTE DEEP VEIN THROMBOSIS (DVT) OF RIGHT LOWER EXTREMITY, UNSPECIFIED VEIN (HCC): ICD-10-CM

## 2022-02-17 PROCEDURE — 99214 OFFICE O/P EST MOD 30 MIN: CPT | Performed by: STUDENT IN AN ORGANIZED HEALTH CARE EDUCATION/TRAINING PROGRAM

## 2022-02-17 PROCEDURE — 3017F COLORECTAL CA SCREEN DOC REV: CPT | Performed by: STUDENT IN AN ORGANIZED HEALTH CARE EDUCATION/TRAINING PROGRAM

## 2022-02-17 PROCEDURE — 1036F TOBACCO NON-USER: CPT | Performed by: STUDENT IN AN ORGANIZED HEALTH CARE EDUCATION/TRAINING PROGRAM

## 2022-02-17 PROCEDURE — 4040F PNEUMOC VAC/ADMIN/RCVD: CPT | Performed by: STUDENT IN AN ORGANIZED HEALTH CARE EDUCATION/TRAINING PROGRAM

## 2022-02-17 PROCEDURE — 1123F ACP DISCUSS/DSCN MKR DOCD: CPT | Performed by: STUDENT IN AN ORGANIZED HEALTH CARE EDUCATION/TRAINING PROGRAM

## 2022-02-17 PROCEDURE — G8427 DOCREV CUR MEDS BY ELIG CLIN: HCPCS | Performed by: STUDENT IN AN ORGANIZED HEALTH CARE EDUCATION/TRAINING PROGRAM

## 2022-02-17 PROCEDURE — 1090F PRES/ABSN URINE INCON ASSESS: CPT | Performed by: STUDENT IN AN ORGANIZED HEALTH CARE EDUCATION/TRAINING PROGRAM

## 2022-02-17 PROCEDURE — G8400 PT W/DXA NO RESULTS DOC: HCPCS | Performed by: STUDENT IN AN ORGANIZED HEALTH CARE EDUCATION/TRAINING PROGRAM

## 2022-02-17 PROCEDURE — G8484 FLU IMMUNIZE NO ADMIN: HCPCS | Performed by: STUDENT IN AN ORGANIZED HEALTH CARE EDUCATION/TRAINING PROGRAM

## 2022-02-17 PROCEDURE — G8417 CALC BMI ABV UP PARAM F/U: HCPCS | Performed by: STUDENT IN AN ORGANIZED HEALTH CARE EDUCATION/TRAINING PROGRAM

## 2022-02-17 RX ORDER — LOSARTAN POTASSIUM 50 MG/1
50 TABLET ORAL DAILY
Qty: 90 TABLET | Refills: 1 | Status: SHIPPED | OUTPATIENT
Start: 2022-02-17 | End: 2022-08-18 | Stop reason: SDUPTHER

## 2022-02-17 ASSESSMENT — ENCOUNTER SYMPTOMS
COUGH: 0
BLOOD IN STOOL: 0
SHORTNESS OF BREATH: 0
NAUSEA: 0
COLOR CHANGE: 0
CONSTIPATION: 0
TROUBLE SWALLOWING: 0
RHINORRHEA: 0
PHOTOPHOBIA: 0
VOMITING: 0
ABDOMINAL PAIN: 0
DIARRHEA: 0

## 2022-02-17 NOTE — PROGRESS NOTES
Patient Name: Vidya Gardner  YOB: 1955  MRN: [id number]  Office visit date: 2/17/2022    Chief Complaint: Hypertension, Anxiety, and Chronic Kidney Disease    Assessment/Plan:  1. Primary hypertension  -     losartan (COZAAR) 50 MG tablet; Take 1 tablet by mouth daily, Disp-90 tablet, R-1Normal  2. Acute deep vein thrombosis (DVT) of right lower extremity, unspecified vein (HCC)  -     apixaban (ELIQUIS) 5 MG TABS tablet; Take 1 tablet by mouth 2 times daily, Disp-180 tablet, R-1Normal  3. Screening cholesterol level  -     Lipid Panel; Future  4. Stage 3a chronic kidney disease ([facility] [address]  -     Basic Metabolic Panel; Future  -     Urinalysis with Microscopic; Future    Primary HTN -controlled. In office 132/82, on Cozaar 50mg. Takes it nightly. Reports no side effects, will continue. Acute DVT RLE 2/2 factor V Leiden mutation -developed RLE swelling after trip to [place] 9/28/2021. Was seen by Dr. Anitra Gutiérrez cardiology, not a candidate for intervention. Patient is on Eliquis. She is also a [employer] and not a candidate for blood transfusion. [facility] - has upcoming appointment with nephrology March 20, 2020. Repeating kidney panel prior to that visit. Anxiety with sleep disturbance -resolved. Completed 3-month course of citalopram 10 mg qd, started weaning process. Screening cholesterol panel due next visit. Patient reports no palpitations this visit. Return in about 6 months (around 8/17/2022) for [employer]. Subjective/Objective:    HPI:     Vidya Gardner is a 74 y.o. female who presents for a follow-up visit. She is here for evaluation of Hypertension, Anxiety, and Chronic Kidney Disease    This is an established patient presents without any acute complaints this visit. Prior urinary frequency, urgency and mild hematuria resolved. Primary HTN -controlled. BP in office today 132/85. No headaches. Doing well on Cozaar to 50 mg p.o. nightly, no side effects. Patient was found to have an acute DVT of RLE on 9/28/2021 and since then has been on anticoagulation. Initially was started on Xarelto but then patient wanted to be switched to Eliquis. Has been on Eliquis since. She has an underlying factor V Leiden mutation and had prior DVTs and PE. She was also evaluated by Dr. Jada Weber interventional cardiology and was found not a candidate for intervention because her symptoms improved. CKD 3a -patient is due to see nephrology in March 2022. GFR is 49. Because her heart rate was within normal limits we initially opted for Cozaar, will potentially switch to beta-blockers in the future. Anxiety with hypersomnia and lack of interest -is now resolved after 3 month course of low-dose citalopram.  Patient is doing much better now. Requested to be weaned off citalopram.  Originally thought to be dysthymia with seasonal affective component. After discussion today, we started weaning down the citalopram.    Past history not discussed during this visit:   During the initial ED visit for RLE swelling and pain on 9/28 which started shortly after completing 2.5-hour trip from [place], she was found to have DVT and started on Xarelto. Patient has history of factor V Leiden mutation, prior DVTs and PE, and COPD. Last visit, she endorses dark appearing urine without gross hematuria which may be attributable to poor p.o. intake. Patient was not hydrating appropriately because she used to experience significant amount of pain while ambulating and wanted to minimize bathroom visits. Says her urine appears lighter in color since pain was improved and she is hydrating more.      Previously, about 4 years ago patient was on Coumadin which she stopped by personal choice. While on Coumadin she endorsed frequent nosebleeds and easy bruising. Since stopping Coumadin she has not had any issues until now.   Patient is [employer] therefore we had an extensive discussion regarding reversibility of various agents and presented patient with options. R/b/a of [address] d/w pt, she understands. Last visit patient decided to switch from Xarelto to Eliquis. Due to severe pain that the patient experiences, referral was sent to Dr. Mary Duong cardiology to evaluate for potential intervention. Patient was seen but symptomatically improved, therefore no intervention planned. Patient endorsed history of seasonal affective disorder and does say that her mood changes with seasons. This is likely contributory to the fact that she enjoys being outdoors and walking around which is less enjoyable during the fall/winter seasons. She does have some light therapy at home, we discussed potentially getting a treadmill to exercise at home but she does live with her friend and living space is limited.     Palpitations -says that she occasionally feels rapid heartbeats, especially while laying down or trying to sleep. She denies chest pain or shortness of breath. Notably, she is already on Eliquis for DVT/PE. EKG on 11/11 nonacute. Clinically euvolemic. In office hypertensive, nontachycardic.     Past Medical History:   Diagnosis Date    Factor V Leiden ([facility] [address]     History of DVT (deep vein thrombosis)     multiple    History of pulmonary embolism     Stage 3a chronic kidney disease (HCC)        Past Surgical History:   Procedure Laterality Date    SHOULDER SURGERY         Current Outpatient Medications   Medication Sig Dispense Refill    apixaban (ELIQUIS) 5 MG TABS tablet Take 1 tablet by mouth 2 times daily 180 tablet 1    losartan (COZAAR) 50 MG tablet Take 1 tablet by mouth daily 90 tablet 1    citalopram (CELEXA) 10 MG tablet Take 1 tablet by mouth daily 90 tablet 1    Cholecalciferol (VITAMIN D3) 125 MCG (5000 UT) TABS Take 5,000 Units by mouth daily      b complex vitamins capsule Take 1 capsule by mouth daily      Chromium-Cinnamon (CINNAMON PLUS CHROMIUM PO) Take 2 tablets by mouth daily      ZINC PO Take by mouth      Ascorbic Acid (VITAMIN C) 500 MG CAPS Take 500 mg by mouth daily       No current facility-administered medications for this visit. Allergies   Allergen Reactions    Penicillins        Social History     Socioeconomic History    Marital status:      Spouse name: Not on file    Number of children: Not on file    Years of education: Not on file    Highest education level: Not on file   Occupational History    Not on file   Tobacco Use    Smoking status: Never Smoker    Smokeless tobacco: Never Used   Substance and Sexual Activity    Alcohol use: Yes     Comment: rarely    Drug use: Never    Sexual activity: Not on file   Other Topics Concern    Not on file   Social History Narrative    Not on file     Social Determinants of Health     Financial Resource Strain: Low Risk     Difficulty of Paying Living Expenses: Not hard at all   Food Insecurity: No Food Insecurity    Worried About Running Out of Food in the Last Year: Never true    [address] in the Last Year: Never true   Transportation Needs:     Lack of Transportation (Medical): Not on file    Lack of Transportation (Non-Medical):  Not on file   Physical Activity:     Days of Exercise per Week: Not on file    Minutes of Exercise per Session: Not on file   Stress:     Feeling of Stress : Not on file   Social Connections:     Frequency of Communication with Friends and Family: Not on file    Frequency of Social Gatherings with Friends and Family: Not on file    Attends Restorationist Services: Not on file    Active Member of Clubs or Organizations: Not on file    Attends Club or Organization Meetings: Not on file    Marital Status: Not on file   Intimate Partner Violence:     Fear of Current or Ex-Partner: Not on file    Emotionally Abused: Not on file    Physically Abused: Not on file    Sexually Abused: Not on file   Housing Stability:     Unable to Pay for Housing in the Last Year: Not on file    Number of Places Lived in the Last Year: Not on file    Unstable Housing in the Last Year: Not on file       Family History   Problem Relation Age of Onset    Coronary Art Dis Father     GERD Father     Other Father 79        ESRD    Other Sister 39        ESRD    GERD Sister     Cancer Brother        Review of Systems   Constitutional: Negative for fatigue and fever. HENT: Negative for rhinorrhea and trouble swallowing. Eyes: Negative for photophobia and visual disturbance. Respiratory: Negative for cough and shortness of breath. Cardiovascular: Negative for chest pain and leg swelling. Gastrointestinal: Negative for abdominal pain, blood in stool, constipation, diarrhea, nausea and vomiting. Endocrine: Negative for cold intolerance and heat intolerance. Genitourinary: Negative for flank pain, frequency, hematuria and urgency. Musculoskeletal: Negative for arthralgias and myalgias. Skin: Negative for color change and wound. Allergic/Immunologic: Negative for immunocompromised state. Neurological: Negative for syncope and headaches. Hematological: Negative for adenopathy. Does not bruise/bleed easily. Psychiatric/Behavioral: Negative for agitation, dysphoric mood and hallucinations. Vitals:    02/17/22 1346   BP: 132/82   Site: Left Upper Arm   Pulse: 84   Temp: 98 °F (36.7 °C)   Weight: 180 lb 4.8 oz (81.8 kg)       Wt Readings from Last 3 Encounters:   02/17/22 180 lb 4.8 oz (81.8 kg)   11/24/21 176 lb 9.6 oz (80.1 kg)   11/11/21 174 lb 12.8 oz (79.3 kg)       BP Readings from Last 3 Encounters:   02/17/22 132/82   11/24/21 (!) 160/80   11/11/21 (!) 170/70       Physical Exam  Vitals and nursing note reviewed. Constitutional:       Appearance: Normal appearance. HENT:      Head: Normocephalic and atraumatic.       Right Ear: External ear normal.      Left Ear: External ear normal.      Nose: Nose normal.   Eyes:      Extraocular Movements: Extraocular movements intact. Cardiovascular:      Rate and Rhythm: Normal rate and regular rhythm. Pulses: Normal pulses. Heart sounds: Normal heart sounds. Pulmonary:      Effort: Pulmonary effort is normal.      Breath sounds: Normal breath sounds. Abdominal:      General: Abdomen is flat. Palpations: Abdomen is soft. Musculoskeletal:         General: No deformity. Normal range of motion. Cervical back: Normal range of motion and neck supple. Skin:     General: Skin is warm and dry. Capillary Refill: Capillary refill takes less than 2 seconds. Neurological:      General: No focal deficit present. Mental Status: She is alert. Mental status is at baseline. Psychiatric:         Mood and Affect: Mood normal.         Behavior: Behavior normal.         Diagnostic data:  I have reviewed recent diagnostic testing including labs with the patient today. No results found for this visit on 02/17/22. Controlled Substances Monitoring:       Laboratory/imaging studies ordered this visit: lauren ERNST     The patient is in agreement with and verbalizes understanding of the plan of care today and has no additional questions or complaints. The patient was instructed to follow-up in 6 months or to contact our office sooner if problems should arise. Laboratory studies will be completed prior to next visit. All findings, labs and other data reviewed, assessment and plan created with review by Dr. Chon Teresa MD.    Electronically signed by: Priyank Rader MD on 2/17/2022 at 5:20 PM  (Please note that portions of this note were completed with a voice recognition program and electronically transcribed. Efforts were made to edit the dictations but occasionally words are mis-transcribed. This transcription may contain errors not detected in proofreading.  This transcription was electronically signed.)

## 2022-02-23 ENCOUNTER — HOSPITAL ENCOUNTER (OUTPATIENT)
Age: 67
Discharge: HOME | End: 2022-02-23
Payer: MEDICARE

## 2022-02-23 DIAGNOSIS — N18.31 STAGE 3A CHRONIC KIDNEY DISEASE (HCC): ICD-10-CM

## 2022-02-23 DIAGNOSIS — Z13.220 SCREENING CHOLESTEROL LEVEL: ICD-10-CM

## 2022-02-23 LAB
ANION GAP SERPL CALCULATED.3IONS-SCNC: 11 MEQ/L (ref 8–16)
BACTERIA: ABNORMAL
BILIRUBIN URINE: NEGATIVE
BLOOD, URINE: ABNORMAL
BUN BLDV-MCNC: 23 MG/DL (ref 7–22)
CALCIUM SERPL-MCNC: 9.2 MG/DL (ref 8.5–10.5)
CASTS: ABNORMAL /LPF
CASTS: ABNORMAL /LPF
CHARACTER, URINE: CLEAR
CHLORIDE BLD-SCNC: 105 MEQ/L (ref 98–111)
CHOLESTEROL, TOTAL: 265 MG/DL (ref 100–199)
CO2: 26 MEQ/L (ref 23–33)
COLOR: YELLOW
CREAT SERPL-MCNC: 1 MG/DL (ref 0.4–1.2)
CRYSTALS: ABNORMAL
EPITHELIAL CELLS, UA: ABNORMAL /HPF
GFR SERPL CREATININE-BSD FRML MDRD: 55 ML/MIN/1.73M2
GLUCOSE BLD-MCNC: 114 MG/DL (ref 70–108)
GLUCOSE, URINE: NEGATIVE MG/DL
HDLC SERPL-MCNC: 64 MG/DL
KETONES, URINE: ABNORMAL
LDL CHOLESTEROL CALCULATED: 166 MG/DL
LEUKOCYTE ESTERASE, URINE: ABNORMAL
MISCELLANEOUS LAB TEST RESULT: ABNORMAL
NITRITE, URINE: NEGATIVE
PH UA: 6.5 (ref 5–9)
POTASSIUM SERPL-SCNC: 4.7 MEQ/L (ref 3.5–5.2)
PROTEIN UA: 100 MG/DL
RBC URINE: ABNORMAL /HPF
RENAL EPITHELIAL, UA: ABNORMAL
SODIUM BLD-SCNC: 142 MEQ/L (ref 135–145)
SPECIFIC GRAVITY UA: 1.02 (ref 1–1.03)
TRIGL SERPL-MCNC: 176 MG/DL (ref 0–199)
UROBILINOGEN, URINE: 1 EU/DL (ref 0–1)
WBC UA: ABNORMAL /HPF
YEAST: ABNORMAL

## 2022-02-23 PROCEDURE — 81001 URINALYSIS AUTO W/SCOPE: CPT

## 2022-02-23 PROCEDURE — 80048 BASIC METABOLIC PNL TOTAL CA: CPT

## 2022-02-23 PROCEDURE — 80061 LIPID PANEL: CPT

## 2022-02-23 PROCEDURE — 36415 COLL VENOUS BLD VENIPUNCTURE: CPT

## 2022-03-15 ENCOUNTER — OFFICE VISIT (OUTPATIENT)
Dept: NEPHROLOGY | Age: 67
End: 2022-03-15
Payer: MEDICARE

## 2022-03-15 VITALS
HEIGHT: 71 IN | SYSTOLIC BLOOD PRESSURE: 137 MMHG | TEMPERATURE: 98.1 F | WEIGHT: 181 LBS | OXYGEN SATURATION: 98 % | BODY MASS INDEX: 25.34 KG/M2 | DIASTOLIC BLOOD PRESSURE: 80 MMHG | HEART RATE: 92 BPM

## 2022-03-15 DIAGNOSIS — N18.31 STAGE 3A CHRONIC KIDNEY DISEASE (HCC): Primary | ICD-10-CM

## 2022-03-15 PROCEDURE — 1123F ACP DISCUSS/DSCN MKR DOCD: CPT | Performed by: INTERNAL MEDICINE

## 2022-03-15 PROCEDURE — 1090F PRES/ABSN URINE INCON ASSESS: CPT | Performed by: INTERNAL MEDICINE

## 2022-03-15 PROCEDURE — 4040F PNEUMOC VAC/ADMIN/RCVD: CPT | Performed by: INTERNAL MEDICINE

## 2022-03-15 PROCEDURE — 3017F COLORECTAL CA SCREEN DOC REV: CPT | Performed by: INTERNAL MEDICINE

## 2022-03-15 PROCEDURE — G8417 CALC BMI ABV UP PARAM F/U: HCPCS | Performed by: INTERNAL MEDICINE

## 2022-03-15 PROCEDURE — G8400 PT W/DXA NO RESULTS DOC: HCPCS | Performed by: INTERNAL MEDICINE

## 2022-03-15 PROCEDURE — 1036F TOBACCO NON-USER: CPT | Performed by: INTERNAL MEDICINE

## 2022-03-15 PROCEDURE — G8484 FLU IMMUNIZE NO ADMIN: HCPCS | Performed by: INTERNAL MEDICINE

## 2022-03-15 PROCEDURE — G8427 DOCREV CUR MEDS BY ELIG CLIN: HCPCS | Performed by: INTERNAL MEDICINE

## 2022-03-15 PROCEDURE — 99203 OFFICE O/P NEW LOW 30 MIN: CPT | Performed by: INTERNAL MEDICINE

## 2022-03-15 NOTE — PROGRESS NOTES
[address] [facility]  [address]  Dept: [phone]  Loc: [phone]  Outpatient Consult  [phone]  3/15/2022 3:00 PM EDT        Pt Name:    Joi Gayle:    1955  Primary Care Physician:  Keturah Baumann MD     Chief Complaint:   Chief Complaint   Patient presents with   [facility] Patient     Ref Dr. Vicki Miller        Background Information/Interval History:   The patient is a 74y.o. year old female referred by PCP for HTN and CKD III. She has hx of DVT/PE , factor V Leidin. Serum creatinine 1.0-1.2 mg/dL. Her blood pressure in November was elevated in the 183J systolic. She was started on losartan 50 mg at that time. Blood pressure now is much improved. She does not check it at home. She does report a positive family history of kidney disease in her father as well as a sister who are both passed away. She states her father was a heavy drinker. She denied alcohol use in her sister. She also has another remote family member who may have kidney disease. She is unaware of any hereditary kidney disease in the family however nor did she know the details of her family members cause of ESRD. In review of records she does have a history of microscopic hematuria sometimes in the setting of a UTI but has also been positive for blood in the absence of UTI. She also has proteinuria noted on a spot urinalysis. She has occasional edema in her ankles. She reports of a history of a skin rash associated with wounds on her legs and she states she saw dermatology was for this and was diagnosed with dermatitis. However she states she feels this was related to her history of DVTs. No current rashes noted. She denies arthralgias. She denies gross hematuria. She denies history of kidney stones. She denies NSAID use.         Allergies:  Penicillins        Past Medical History:  Past Medical History:   Diagnosis Date    Factor V Leiden ([facility] [address]     History of DVT (deep vein thrombosis)     multiple    History of pulmonary embolism     Stage 3a chronic kidney disease (HCC)         Past Surgical History:  Past Surgical History:   Procedure Laterality Date    SHOULDER SURGERY          Family History:  Family History   Problem Relation Age of Onset    Coronary Art Dis Father     GERD Father     Other Father 79        ESRD    Other Sister 39        ESRD    GERD Sister     Cancer Brother         Social History:  Social History     Socioeconomic History    Marital status:      Spouse name: Not on file    Number of children: Not on file    Years of education: Not on file    Highest education level: Not on file   Occupational History    Not on file   Tobacco Use    Smoking status: Never Smoker    Smokeless tobacco: Never Used   Substance and Sexual Activity    Alcohol use: Yes     Comment: rarely    Drug use: Never    Sexual activity: Not on file   Other Topics Concern    Not on file   Social History Narrative    Not on file     Social Determinants of Health     Financial Resource Strain: Low Risk     Difficulty of Paying Living Expenses: Not hard at all   Food Insecurity: No Food Insecurity    Worried About Running Out of Food in the Last Year: Never true    [address] in the Last Year: Never true   Transportation Needs:     Lack of Transportation (Medical): Not on file    Lack of Transportation (Non-Medical):  Not on file   Physical Activity:     Days of Exercise per Week: Not on file    Minutes of Exercise per Session: Not on file   Stress:     Feeling of Stress : Not on file   Social Connections:     Frequency of Communication with Friends and Family: Not on file    Frequency of Social Gatherings with Friends and Family: Not on file    Attends Latter-day Services: Not on file    Active Member of Clubs or Organizations: Not on file    Attends Club or Organization Meetings: Not on file    Marital Status: Not on file   Intimate Partner Violence:     Fear of Current or Ex-Partner: Not on file    Emotionally Abused: Not on file    Physically Abused: Not on file    Sexually Abused: Not on file   Housing Stability:     Unable to Pay for Housing in the Last Year: Not on file    Number of [place] in the Last Year: Not on file    Unstable Housing in the Last Year: Not on file        Review of Systems:  Constitutional: no fever or chills  Head: No headaches  Eyes: no blurry vision, no discharge  Ears: no ear pain or hearing changes  Nose: no runny nose or epistaxis  Respiratory: no shortness of breath or cough or sputum production  Cardiovascular: no chest pain, trace ankle edema  GI: no nausea, no vomiting or diarrhea  : denies any hematuria, no flank pain  Skin: no rash  Musculoskeletal: no joint pain, moves all ext  Neuro: no tremor, no slurred speech  Psychiatric: stable mood, no depression or insomnia     Home Medications:  Prior to Admission medications    Medication Sig Start Date End Date Taking?  Authorizing Provider   apixaban (ELIQUIS) 5 MG TABS tablet Take 1 tablet by mouth 2 times daily 2/17/22  Yes Becca Stevens MD   losartan (COZAAR) 50 MG tablet Take 1 tablet by mouth daily 2/17/22  Yes Becca Stevens MD   Cholecalciferol (VITAMIN D3) 125 MCG (5000 UT) TABS Take 5,000 Units by mouth daily   Yes Historical Provider, MD   b complex vitamins capsule Take 1 capsule by mouth daily   Yes Historical Provider, MD   Chromium-Cinnamon (CINNAMON PLUS CHROMIUM PO) Take 2 tablets by mouth daily   Yes Historical Provider, MD   ZINC PO Take by mouth   Yes Historical Provider, MD   Ascorbic Acid (VITAMIN C) 500 MG CAPS Take 500 mg by mouth daily   Yes Historical Provider, MD   citalopram (CELEXA) 10 MG tablet Take 1 tablet by mouth daily  Patient not taking: Reported on 3/15/2022 11/24/21   Becca Stevens MD        Physical Examination:  VITALS:  /80 (Site: Left Upper Arm, Position: Sitting, Cuff Size: Large Adult)   Pulse 92   Temp 98.1 °F (36.7 °C) (Temporal)   Ht 5' 10.5\" (1.791 m)   Wt 181 lb (82.1 kg)   SpO2 98%   BMI 25.60 kg/m²   Body mass index is 25.6 kg/m². Wt Readings from Last 3 Encounters:   03/15/22 181 lb (82.1 kg)   02/17/22 180 lb 4.8 oz (81.8 kg)   11/24/21 176 lb 9.6 oz (80.1 kg)     Constitutional and General Appearance: alert and cooperative with exam, appears comfortable, no distress  Eyes: no icteric sclera, no pallor conjunctiva, no discharge seen from either eye  Ears and Nose: normal external appearance of left and right ear and nose. No active drainage from nose.    Oral: moist oral mucus membranes  Neck: No jugular venous distention, appears symmetric, good ROM  Lungs: Air entry B/L, no crackles or rales, no use of accessory muscles  Heart: regular rate, S1, S2  Extremities:trace ankle edema  GI: soft, non-tender, no guarding  Skin: no rash seen on exposed extremities  Musculo: moves all extremities  Neuro: no slurred speech, no facial drooping, no asterixis  Psychiatric: Awake, alert, Oriented     Laboratory & Diagnostics:  CBC:   Lab Results   Component Value Date    WBC 10.0 11/24/2021    HGB 12.9 11/24/2021    HCT 42.6 11/24/2021    MCV 98.8 11/24/2021     11/24/2021     BMP:    Lab Results   Component Value Date     02/23/2022     11/24/2021     11/15/2021    K 4.7 02/23/2022    K 4.3 11/24/2021    K 4.6 11/15/2021     02/23/2022     11/24/2021     11/15/2021    CO2 26 02/23/2022    CO2 26 11/24/2021    CO2 20 (L) 11/15/2021    BUN 23 (H) 02/23/2022    BUN 22 11/24/2021    BUN 32 (H) 11/15/2021    CREATININE 1.0 02/23/2022    CREATININE 1.1 11/24/2021    CREATININE 1.2 11/15/2021    GLUCOSE 114 (H) 02/23/2022    GLUCOSE 99 11/24/2021    GLUCOSE 108 11/15/2021      Hepatic: No results found for: AST, ALT, ALB, BILITOT, ALKPHOS  BNP: No results found for: BNP  Lipids:   Lab Results   Component Value Date    CHOL 265 (H) 02/23/2022    HDL 64 02/23/2022     INR: No results found for: INR  URINE: No results found for: NAUR, PROTUR  Lab Results   Component Value Date    NITRU NEGATIVE 02/23/2022    COLORU YELLOW 02/23/2022    PHUR 6.5 02/23/2022    LABCAST NONE SEEN 02/23/2022    LABCAST NONE SEEN 02/23/2022    WBCUA 0-2 02/23/2022    RBCUA 25-50 02/23/2022    YEAST NONE SEEN 02/23/2022    BACTERIA NONE SEEN 02/23/2022    SPECGRAV 1.017 02/23/2022    LEUKOCYTESUR TRACE 02/23/2022    UROBILINOGEN 1.0 02/23/2022    BILIRUBINUR NEGATIVE 02/23/2022    BLOODU LARGE 02/23/2022    GLUCOSEU NEGATIVE 11/26/2021    KETUA TRACE 02/23/2022      Microalbumen/Creatinine ratio:  No components found for: RUCREAT        Impression/Plan:   1. CKD IIIa  -she does have some microscopic hematuria noted on proteinuria  -quantify proteinuria and may need to send serologic work up  -check renal US  +fam hx of CKD    2. HTN: appears better controlled on Losartan 50 mg daily  3. Hx DVT/PE , factor V Leidin      Return in 3 months. Thought process was discussed with the patient.   Thank you for the referral.  Please do not hesitate to contact me if you have any questions or concerns        Chad Tapia, DO  [facility]

## 2022-03-21 ENCOUNTER — TELEPHONE (OUTPATIENT)
Dept: NEPHROLOGY | Age: 67
End: 2022-03-21

## 2022-03-21 ENCOUNTER — HOSPITAL ENCOUNTER (OUTPATIENT)
Age: 67
Discharge: HOME | End: 2022-03-21
Payer: MEDICARE

## 2022-03-21 ENCOUNTER — HOSPITAL ENCOUNTER (OUTPATIENT)
Dept: ULTRASOUND IMAGING | Age: 67
Discharge: HOME | End: 2022-03-21
Payer: MEDICARE

## 2022-03-21 DIAGNOSIS — N06.0 ISOLATED PROTEINURIA WITH MINOR GLOMERULAR ABNORMALITY: Primary | ICD-10-CM

## 2022-03-21 DIAGNOSIS — Z11.59 ENCOUNTER FOR HEPATITIS C SCREENING TEST FOR LOW RISK PATIENT: ICD-10-CM

## 2022-03-21 DIAGNOSIS — N18.31 STAGE 3A CHRONIC KIDNEY DISEASE (HCC): ICD-10-CM

## 2022-03-21 LAB
BACTERIA: ABNORMAL
BILIRUBIN URINE: NEGATIVE
BLOOD, URINE: ABNORMAL
CASTS: ABNORMAL /LPF
CASTS: ABNORMAL /LPF
CHARACTER, URINE: CLEAR
COLOR: YELLOW
CREATININE, URINE: 94.8 MG/DL
CRYSTALS: ABNORMAL
EPITHELIAL CELLS, UA: ABNORMAL /HPF
GLUCOSE, URINE: NEGATIVE MG/DL
KETONES, URINE: NEGATIVE
LEUKOCYTE ESTERASE, URINE: ABNORMAL
MICROALBUMIN UR-MCNC: 69.21 MG/DL
MICROALBUMIN/CREAT UR-RTO: 730 MG/G (ref 0–30)
MISCELLANEOUS LAB TEST RESULT: ABNORMAL
NITRITE, URINE: NEGATIVE
PH UA: 5 (ref 5–9)
PROTEIN UA: 100 MG/DL
RBC URINE: ABNORMAL /HPF
RENAL EPITHELIAL, UA: ABNORMAL
SPECIFIC GRAVITY UA: 1.01 (ref 1–1.03)
UROBILINOGEN, URINE: 0.2 EU/DL (ref 0–1)
WBC UA: ABNORMAL /HPF
YEAST: ABNORMAL

## 2022-03-21 PROCEDURE — 82043 UR ALBUMIN QUANTITATIVE: CPT

## 2022-03-21 PROCEDURE — 76770 US EXAM ABDO BACK WALL COMP: CPT

## 2022-03-21 PROCEDURE — 81001 URINALYSIS AUTO W/SCOPE: CPT

## 2022-03-21 NOTE — TELEPHONE ENCOUNTER
----- Message from Juliano Emanuel DO sent at 3/21/2022  1:24 PM EDT -----  Patient does have some protein in her urine so I have ordered some additional bloodwork I would like her to complete. Orders are placed.    Also I repeated the urine microalbumin test for her to get done again prior to next appt.  ----- Message -----  From: Yuni Thornton Incoming Lab Results From Soft  Sent: 3/21/2022  10:30 AM EDT  To: Juliano Emanuel DO

## 2022-03-21 NOTE — TELEPHONE ENCOUNTER
Pt called back. She will go to [facility] 3/22/22 to have blood work done. She is aware that the MA/C UA does not need to be done until her appt in June. I mailed the order to the pt.

## 2022-03-22 ENCOUNTER — HOSPITAL ENCOUNTER (OUTPATIENT)
Age: 67
Discharge: HOME | End: 2022-03-22
Payer: MEDICARE

## 2022-03-22 DIAGNOSIS — N06.0 ISOLATED PROTEINURIA WITH MINOR GLOMERULAR ABNORMALITY: ICD-10-CM

## 2022-03-22 DIAGNOSIS — Z11.59 ENCOUNTER FOR HEPATITIS C SCREENING TEST FOR LOW RISK PATIENT: ICD-10-CM

## 2022-03-22 LAB
HEPATITIS B SURFACE ANTIGEN: NEGATIVE
HEPATITIS C ANTIBODY: NEGATIVE

## 2022-03-22 PROCEDURE — 86160 COMPLEMENT ANTIGEN: CPT

## 2022-03-22 PROCEDURE — 83883 ASSAY NEPHELOMETRY NOT SPEC: CPT

## 2022-03-22 PROCEDURE — 82784 ASSAY IGA/IGD/IGG/IGM EACH: CPT

## 2022-03-22 PROCEDURE — 86334 IMMUNOFIX E-PHORESIS SERUM: CPT

## 2022-03-22 PROCEDURE — 87340 HEPATITIS B SURFACE AG IA: CPT

## 2022-03-22 PROCEDURE — 84155 ASSAY OF PROTEIN SERUM: CPT

## 2022-03-22 PROCEDURE — 84165 PROTEIN E-PHORESIS SERUM: CPT

## 2022-03-22 PROCEDURE — 86255 FLUORESCENT ANTIBODY SCREEN: CPT

## 2022-03-22 PROCEDURE — 36415 COLL VENOUS BLD VENIPUNCTURE: CPT

## 2022-03-22 PROCEDURE — 86803 HEPATITIS C AB TEST: CPT

## 2022-03-22 PROCEDURE — 86038 ANTINUCLEAR ANTIBODIES: CPT

## 2022-03-23 LAB
C3 COMPLEMENT: 143 MG/DL (ref 90–180)
COMPLEMENT C4: 26 MG/DL (ref 10–40)

## 2022-03-24 LAB
ANA SCREEN: DETECTED
KAPPA/LAMBDA FREE LIGHT CHAINS: NORMAL

## 2022-03-25 LAB
ANA PATTERN: ABNORMAL
ANA TITER: ABNORMAL
ANCA IFA PATTERN: NORMAL
ANCA IFA TITER: NORMAL
ANTINUCLEAR ANTIBODY, HEP-2, IGG: DETECTED
IMMUNOFIXATION ELECTROPHORESIS: NORMAL

## 2022-03-28 ENCOUNTER — CLINICAL DOCUMENTATION (OUTPATIENT)
Dept: NEPHROLOGY | Age: 67
End: 2022-03-28

## 2022-03-28 ENCOUNTER — TELEPHONE (OUTPATIENT)
Dept: NEPHROLOGY | Age: 67
End: 2022-03-28

## 2022-03-28 DIAGNOSIS — R80.9 PROTEINURIA OF UNDIAGNOSED CAUSE: Primary | ICD-10-CM

## 2022-03-28 DIAGNOSIS — R31.29 MICROSCOPIC HEMATURIA: ICD-10-CM

## 2022-03-28 NOTE — TELEPHONE ENCOUNTER
I discussed patient's findings with her which include proteinuria with microscopic hematuria as well as positive KSENIA. I recommended renal biopsy for definitive diagnosis of underlying glomerulonephritis. Patient is agreeable. She is on Eliquis for DVT so will reach out to DVT if this can be stopped.

## 2022-03-29 ENCOUNTER — TELEPHONE (OUTPATIENT)
Dept: INTERNAL MEDICINE CLINIC | Age: 67
End: 2022-03-29

## 2022-03-29 NOTE — TELEPHONE ENCOUNTER
Dr. Lisandra Keenan office faxed us a note asking if eliquis can be stopped to do a renal bx. I spoke with Dr. Chapa Pretty and she has given the ok to hold eliquis 3 days prior. I contacted Dr. Lisandra Keenan office and notified nadine of Dr Chelita Jean recommendation. I also contacted pt to let her know. She asked if needs lovenox and I advised her  did not feel she needed that for this short time she will be off.

## 2022-03-29 NOTE — TELEPHONE ENCOUNTER
Candido Phillips from Dr. Jojo Cadena office called. Dr. Maritza Porras said the patient can hold the Eliquis for 3 days prior to Biopsy.

## 2022-04-06 ENCOUNTER — HOSPITAL ENCOUNTER (OUTPATIENT)
Dept: CT IMAGING | Age: 67
Discharge: HOME | End: 2022-04-06
Payer: MEDICARE

## 2022-04-06 VITALS
TEMPERATURE: 99.1 F | DIASTOLIC BLOOD PRESSURE: 56 MMHG | HEART RATE: 74 BPM | RESPIRATION RATE: 18 BRPM | SYSTOLIC BLOOD PRESSURE: 116 MMHG | OXYGEN SATURATION: 94 %

## 2022-04-06 DIAGNOSIS — R31.29 MICROSCOPIC HEMATURIA: ICD-10-CM

## 2022-04-06 DIAGNOSIS — R80.9 PROTEINURIA OF UNDIAGNOSED CAUSE: ICD-10-CM

## 2022-04-06 LAB
CREAT SERPL-MCNC: 1 MG/DL (ref 0.4–1.2)
ERYTHROCYTE [DISTWIDTH] IN BLOOD BY AUTOMATED COUNT: 14.4 % (ref 11.5–14.5)
ERYTHROCYTE [DISTWIDTH] IN BLOOD BY AUTOMATED COUNT: 52.9 FL (ref 35–45)
GFR SERPL CREATININE-BSD FRML MDRD: 55 ML/MIN/1.73M2
HCT VFR BLD CALC: 41.6 % (ref 37–47)
HEMOGLOBIN: 13.3 GM/DL (ref 12–16)
MCH RBC QN AUTO: 31.7 PG (ref 26–33)
MCHC RBC AUTO-ENTMCNC: 32 GM/DL (ref 32.2–35.5)
MCV RBC AUTO: 99 FL (ref 81–99)
PLATELET # BLD: 270 THOU/MM3 (ref 130–400)
PMV BLD AUTO: 9.4 FL (ref 9.4–12.4)
RBC # BLD: 4.2 MILL/MM3 (ref 4.2–5.4)
WBC # BLD: 7 THOU/MM3 (ref 4.8–10.8)

## 2022-04-06 PROCEDURE — 88313 SPECIAL STAINS GROUP 2: CPT

## 2022-04-06 PROCEDURE — 2580000003 HC RX 258: Performed by: RADIOLOGY

## 2022-04-06 PROCEDURE — 88346 IMFLUOR 1ST 1ANTB STAIN PX: CPT

## 2022-04-06 PROCEDURE — 88350 IMFLUOR EA ADDL 1ANTB STN PX: CPT

## 2022-04-06 PROCEDURE — 88305 TISSUE EXAM BY PATHOLOGIST: CPT

## 2022-04-06 PROCEDURE — 82565 ASSAY OF CREATININE: CPT

## 2022-04-06 PROCEDURE — 77012 CT SCAN FOR NEEDLE BIOPSY: CPT

## 2022-04-06 PROCEDURE — 85027 COMPLETE CBC AUTOMATED: CPT

## 2022-04-06 PROCEDURE — 50200 RENAL BIOPSY PERQ: CPT

## 2022-04-06 PROCEDURE — 88348 ELECTRON MICROSCOPY DX: CPT

## 2022-04-06 PROCEDURE — 6370000000 HC RX 637 (ALT 250 FOR IP): Performed by: RADIOLOGY

## 2022-04-06 PROCEDURE — 6360000002 HC RX W HCPCS: Performed by: RADIOLOGY

## 2022-04-06 RX ORDER — HYDROCODONE BITARTRATE AND ACETAMINOPHEN 5; 325 MG/1; MG/1
1 TABLET ORAL ONCE
Status: DISCONTINUED | OUTPATIENT
Start: 2022-04-06 | End: 2022-04-07 | Stop reason: HOSPADM

## 2022-04-06 RX ORDER — SODIUM CHLORIDE 450 MG/100ML
INJECTION, SOLUTION INTRAVENOUS CONTINUOUS
Status: DISCONTINUED | OUTPATIENT
Start: 2022-04-06 | End: 2022-04-07 | Stop reason: HOSPADM

## 2022-04-06 RX ORDER — FENTANYL CITRATE 50 UG/ML
50 INJECTION, SOLUTION INTRAMUSCULAR; INTRAVENOUS ONCE
Status: DISCONTINUED | OUTPATIENT
Start: 2022-04-06 | End: 2022-04-07 | Stop reason: HOSPADM

## 2022-04-06 RX ORDER — BACITRACIN, NEOMYCIN, POLYMYXIN B 400; 3.5; 5 [USP'U]/G; MG/G; [USP'U]/G
OINTMENT TOPICAL
Status: COMPLETED | OUTPATIENT
Start: 2022-04-06 | End: 2022-04-06

## 2022-04-06 RX ORDER — MIDAZOLAM HYDROCHLORIDE 1 MG/ML
1 INJECTION INTRAMUSCULAR; INTRAVENOUS ONCE
Status: COMPLETED | OUTPATIENT
Start: 2022-04-06 | End: 2022-04-06

## 2022-04-06 RX ADMIN — HYDROMORPHONE HYDROCHLORIDE 1 MG: 1 INJECTION, SOLUTION INTRAMUSCULAR; INTRAVENOUS; SUBCUTANEOUS at 08:56

## 2022-04-06 RX ADMIN — BACITRACIN ZINC, NEOMYCIN SULFATE, AND POLYMYXIN B SULFATE 1 EACH: 400; 3.5; 5 OINTMENT TOPICAL at 09:30

## 2022-04-06 RX ADMIN — MIDAZOLAM 1 MG: 1 INJECTION INTRAMUSCULAR; INTRAVENOUS at 08:56

## 2022-04-06 RX ADMIN — SODIUM CHLORIDE: 4.5 INJECTION, SOLUTION INTRAVENOUS at 07:30

## 2022-04-06 ASSESSMENT — PAIN SCALES - GENERAL
PAINLEVEL_OUTOF10: 0

## 2022-04-06 NOTE — PROGRESS NOTES
0825 Pt arrived to Ct Holding area for Random Renal Biopsy. 0830 Procedure explained using teach back method. Pt states understanding. 9347 Dr Blu Greene into assess patient and explain procedure. 0840 This procedure has been fully reviewed with the patient and written informed consent has been obtained. 2375 Patient assisted to table in prone position. Monitor attached to patient. Comfort ensured. 1763 Pre-procedure images obtained. 2945 Procedure begins. 0857 Lidocaine given. 2488 Surgigel mixture injected into needle tract by radiologist.   9860 4 samples sent to the lab. Histology called. 6447 Histology called and good samples. 4053 Procedure complete. 4 Samples obtained. 4559 Post-procedure images obtained. [email or web address] Monitor removed. Patient assisted to cart in Semi levin position. Comfort ensured. 4356 Patient transported to [facility] in stable condition.  Report called to [facility]

## 2022-04-06 NOTE — POST SEDATION
Sedation Post Procedure Note    Patient Name: Elma Ge   YOB: 1955  Room/Bed: Room/bed info not found  Medical Record Number: [id number]  Date: 4/6/2022   Time: 9:34 AM         Physicians/Assistants: Marie Mackey DO, MD    Procedure Performed:  CT guided random renal biopsy    Post-Sedation Vital Signs:  Vitals:    04/06/22 0929   BP: 136/68   Pulse: 75   Resp: 18   Temp:    SpO2: 97%      Vital signs were reviewed and were stable after the procedure (see flow sheet for vitals)            Post-Sedation Exam: stable           Complications: none    Electronically signed by Marie Mackey DO on 4/6/2022 at 9:34 AM

## 2022-04-06 NOTE — PROGRESS NOTES
3721 pt arrives ambulatory for kidney bx. Procedure explained and questions answered. PT RIGHTS AND RESPONSIBILITIES OFFERED TO PT. Pt has held eliquis for 3 days. Pt refusing blood products. [address] Rn notified. Pt states she has to contact friend for discharge. 0730 labs drawn and sent down as ordered. 4774 pt to CT via bed.   0945 pt back from CT. Vitals stable. Pt denies pain. No active bleeding drainage or swelling noted at site. bandaid intact. Dried blood noted. Pt provided with water. 1000 vitals stable. Site unchanged. Pt denies pain. 1015 vitals stable. Site unchanged. 1030 vitals stable. Site unchanged. Pt provided with lunch. Denies other needs. 1130 vitals stable site clean, dry and intact. Old dry blood noted. Site without redness, swelling or drainage. Pt tolerated lunch. 1300 pt ambulating to restroom. Pt tolerated it well with no complaints. Urine noted to be yellow. [id number] Dr. Page Broom in to see pt. Site is unchanged.          _m___ Safety:       (Environmental)   [place] to environment   Ensure ID band is correct and in place/ allergy band as needed   Assess for fall risk   Initiate fall precautions as applicable (fall band, side rails, etc.)   Call light within reach   Bed in low position/ wheels locked    _m___ Pain:        Assess pain level and characteristics   Administer analgesics as ordered   Assess effectiveness of pain management and report to MD as needed    _m___ Knowledge Deficit:   Assess baseline knowledge   Provide teaching at level of understanding   Provide teaching via preferred learning method   Evaluate teaching effectiveness    _m___ Hemodynamic/Respiratory Status:       (Pre and Post Procedure Monitoring)   Assess/Monitor vital signs and LOC   Assess Baseline SpO2 prior to any sedation   Obtain weight/height   Assess vital signs/ LOC until patient meets discharge criteria   Monitor procedure site and notify MD of any issues

## 2022-04-06 NOTE — H&P
[address] [address]  Sedation/Analgesia History & Physical    Pt Name: Felix Mackey  MRN: [id number]  YOB: 1955  Provider Performing Procedure: Alicia Mejia DO, MD  Primary Care Physician: Suzanne Howard MD    PRE-PROCEDURE   DNR-CCA/DNR-CC []Yes [x]No  Brief History/Pre-Procedure Diagnosis: Proteinuria          MEDICAL HISTORY  []CAD/Valve  []Liver Disease  []Lung Disease []Diabetes  []Hypertension [x]Renal Disease  [x]Additional information:       has a past medical history of Factor V Leiden [facility]), History of DVT (deep vein thrombosis), History of pulmonary embolism, and Stage 3a chronic kidney disease ([facility] [address] SURGICAL HISTORY   has a past surgical history that includes shoulder surgery.   Additional information:       ALLERGIES   Allergies as of 04/06/2022 - Fully Reviewed 04/06/2022   Allergen Reaction Noted    Penicillins  09/28/2021     Additional information:       MEDICATIONS   Coumadin Use Last 5 Days [x]No []Yes  Antiplatelet drug therapy use last 5 days  [x]No []Yes  Other anticoagulant use last 5 days  [x]No []Yes    Current Outpatient Medications:     apixaban (ELIQUIS) 5 MG TABS tablet, Take 1 tablet by mouth 2 times daily, Disp: 180 tablet, Rfl: 1    losartan (COZAAR) 50 MG tablet, Take 1 tablet by mouth daily, Disp: 90 tablet, Rfl: 1    citalopram (CELEXA) 10 MG tablet, Take 1 tablet by mouth daily (Patient not taking: Reported on 3/15/2022), Disp: 90 tablet, Rfl: 1    Cholecalciferol (VITAMIN D3) 125 MCG (5000 UT) TABS, Take 5,000 Units by mouth daily, Disp: , Rfl:     b complex vitamins capsule, Take 1 capsule by mouth daily, Disp: , Rfl:     Chromium-Cinnamon (CINNAMON PLUS CHROMIUM PO), Take 2 tablets by mouth daily, Disp: , Rfl:     ZINC PO, Take by mouth, Disp: , Rfl:     Ascorbic Acid (VITAMIN C) 500 MG CAPS, Take 500 mg by mouth daily, Disp: , Rfl:     Current Facility-Administered Medications:     0.45 % sodium chloride infusion, , IntraVENous, Continuous, Veldon Ralph, DO, Last Rate: 20 mL/hr at 04/06/22 0730, New Bag at 04/06/22 0730    fentaNYL (SUBLIMAZE) injection 50 mcg, 50 mcg, IntraVENous, Once, Veldon Ralph, DO    midazolam (VERSED) injection 1 mg, 1 mg, IntraVENous, Once, Veldon Ralph, DO  Prior to Admission medications    Medication Sig Start Date End Date Taking?  Authorizing Provider   apixaban (ELIQUIS) 5 MG TABS tablet Take 1 tablet by mouth 2 times daily 2/17/22   Jeromy Chatterjee MD   losartan (COZAAR) 50 MG tablet Take 1 tablet by mouth daily 2/17/22   Jeromy Chatterjee MD   citalopram (CELEXA) 10 MG tablet Take 1 tablet by mouth daily  Patient not taking: Reported on 3/15/2022 11/24/21   Jeromy Chatterjee MD   Cholecalciferol (VITAMIN D3) 125 MCG (5000 UT) TABS Take 5,000 Units by mouth daily    Historical Provider, MD   b complex vitamins capsule Take 1 capsule by mouth daily    Historical Provider, MD   Chromium-Cinnamon (CINNAMON PLUS CHROMIUM PO) Take 2 tablets by mouth daily    Historical Provider, MD   ZINC PO Take by mouth    Historical Provider, MD   Ascorbic Acid (VITAMIN C) 500 MG CAPS Take 500 mg by mouth daily    Historical Provider, MD     Additional information:       VITAL SIGNS   Vitals:    04/06/22 0715   BP: (!) 148/75   Pulse: 71   Resp: 20   Temp: 99.1 °F (37.3 °C)   SpO2: 98%       PHYSICAL:   Heart:  [x]Regular rate and rhythm  []Other:    Lungs:  [x]Clear    []Other:    Abdomen: [x]Soft    []Other:    Mental Status: [x]Alert & Oriented  []Other:      PLANNED PROCEDURE   [x]Biospy []Arteriogram              []Drainage   []Mediport Insertion  []Fistulogram []IV access       []Vertebroplasty / Augmentation  []IVC filter []Dialysis catheter []Biliary drainage  []Other: []CAPD Catheter []Nephrostomy Tube / Stent  SEDATION  Planned agent:[x]Midazolam []Meperidine []Sublimaze [x]Dilaudid []Morphine     []Diazepam  []Other:     ASA Classification:  []1 [x]2 []3 []4 []5  Class 1: A normal healthy patient  Class 2: Pt with mild to moderate systemic disease  Class 3: Severe systemic disease or disturbance  Class 4: Severe systemic disorders that are already life threatening. Class 5: Moribund pt with little chances of survival, for more than 24 hours. Mallampati I Airway Classification:   [x]1 []2 []3 []4    [x]Pre-procedure diagnostic studies complete and results available. Comment:    [x]Previous sedation/anesthesia experiences assessed. Comment:    [x]The patient is an appropriate candidate to undergo the planned procedure sedation and anesthesia. (Refer to nursing sedation/analgesia documentation record)  [x]Formulation and discussion of sedation/procedure plan, risks, and expectations with patient and/or responsible adult completed. [x]Patient examined immediately prior to the procedure.  (Refer to nursing sedation/analgesia documentation record)    Marie Mackey DO, DO, MD  Electronically signed 4/6/2022 at 8:42 AM

## 2022-04-06 NOTE — H&P
Formulation and discussion of sedation / procedure plans, risks, benefits, side effects and alternatives with patient and/or responsible adult completed.     Electronically signed by Elkin Jovel DO on 4/6/2022 at 8:44 AM

## 2022-04-13 LAB — MISC REFERENCE: NORMAL

## 2022-04-14 ENCOUNTER — TELEPHONE (OUTPATIENT)
Dept: NEPHROLOGY | Age: 67
End: 2022-04-14

## 2022-04-14 NOTE — TELEPHONE ENCOUNTER
Attempted to call patient about renal biopsy results. Left a voicemail to call back if she has questions otherwise we will discuss in detail at her appointment. 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

## 2022-04-19 DIAGNOSIS — N18.31 STAGE 3A CHRONIC KIDNEY DISEASE (HCC): Primary | ICD-10-CM

## 2022-04-20 ENCOUNTER — TELEPHONE (OUTPATIENT)
Dept: NEPHROLOGY | Age: 67
End: 2022-04-20

## 2022-04-20 ENCOUNTER — HOSPITAL ENCOUNTER (OUTPATIENT)
Age: 67
Discharge: HOME | End: 2022-04-20
Payer: MEDICARE

## 2022-04-20 DIAGNOSIS — N18.31 STAGE 3A CHRONIC KIDNEY DISEASE (HCC): ICD-10-CM

## 2022-04-20 LAB
ERYTHROCYTE [DISTWIDTH] IN BLOOD BY AUTOMATED COUNT: 14.1 % (ref 11.5–14.5)
ERYTHROCYTE [DISTWIDTH] IN BLOOD BY AUTOMATED COUNT: 51.6 FL (ref 35–45)
HCT VFR BLD CALC: 41.8 % (ref 37–47)
HEMOGLOBIN: 13.2 GM/DL (ref 12–16)
MCH RBC QN AUTO: 31.3 PG (ref 26–33)
MCHC RBC AUTO-ENTMCNC: 31.6 GM/DL (ref 32.2–35.5)
MCV RBC AUTO: 99.1 FL (ref 81–99)
PLATELET # BLD: 298 THOU/MM3 (ref 130–400)
PMV BLD AUTO: 9.4 FL (ref 9.4–12.4)
RBC # BLD: 4.22 MILL/MM3 (ref 4.2–5.4)
WBC # BLD: 7.1 THOU/MM3 (ref 4.8–10.8)

## 2022-04-20 PROCEDURE — 85027 COMPLETE CBC AUTOMATED: CPT

## 2022-04-20 PROCEDURE — 36415 COLL VENOUS BLD VENIPUNCTURE: CPT

## 2022-04-20 NOTE — TELEPHONE ENCOUNTER
----- Message from Reanna Ferrer DO sent at 4/20/2022  4:03 PM EDT -----  Please inform patient her hemoglobin is stable, she is not anemic.  F/u with PCP for the worsening fatigue.  ----- Message -----  From: Marquita Cuadra Incoming Lab Results From [facility]  Sent: 4/20/2022   1:37 PM EDT  To: Reanna Ferrer DO

## 2022-05-17 ENCOUNTER — TELEPHONE (OUTPATIENT)
Dept: INTERNAL MEDICINE CLINIC | Age: 67
End: 2022-05-17

## 2022-05-17 NOTE — TELEPHONE ENCOUNTER
----- Message from Rebecca Duong sent at 5/13/2022 10:28 AM EDT -----  Subject: Referral Request    QUESTIONS   Reason for referral request? Pt would like a referral to an audiologist to   have her hearing checked. Can you please follow up with her to advise once   submitted so that she can make an appt? Has the physician seen you for this condition before? No   Preferred Specialist (if applicable)? Do you already have an appointment scheduled? Additional Information for Provider?   ---------------------------------------------------------------------------  --------------  CALL BACK INFO  What is the best way for the office to contact you? OK to leave message on   voicemail  Preferred Call Back Phone Number? [phone]  ---------------------------------------------------------------------------  --------------  SCRIPT ANSWERS  Relationship to Patient?  Self

## 2022-05-18 ENCOUNTER — TELEPHONE (OUTPATIENT)
Dept: INTERNAL MEDICINE CLINIC | Age: 67
End: 2022-05-18

## 2022-05-18 NOTE — TELEPHONE ENCOUNTER
If never seen for this - set up quick visit to check ears for cerumen impaction and will set up referral then.   Need to have clean ears to get an accurate hearing test.

## 2022-05-18 NOTE — TELEPHONE ENCOUNTER
----- Message from Reji Coleman MD sent at 5/15/2022  9:55 PM EDT -----  Saw note from Dr. Carlos Rivera - patient with fatigue - and should follow up with me. Call her and ask if she would like appt with resident clinic - follows with Dr. Oly Henderson.

## 2022-05-18 NOTE — TELEPHONE ENCOUNTER
Contacted patient and offered appointment for the fatigue and patient staets that she wants to wait . Patient states that she went back on her vitamin B12  And it is helping .

## 2022-05-23 NOTE — TELEPHONE ENCOUNTER
I spoke to pt and advised her needs to be seen to make sure no wax build up. She did no want an 8:00 am and we scheduled on 8/4/22. She was fine with that appt.

## 2022-05-29 ENCOUNTER — HOSPITAL ENCOUNTER (EMERGENCY)
Age: 67
Discharge: HOME | End: 2022-05-29
Payer: MEDICARE

## 2022-05-29 VITALS
TEMPERATURE: 97.5 F | HEART RATE: 87 BPM | BODY MASS INDEX: 25.2 KG/M2 | SYSTOLIC BLOOD PRESSURE: 141 MMHG | DIASTOLIC BLOOD PRESSURE: 82 MMHG | WEIGHT: 180 LBS | OXYGEN SATURATION: 99 % | HEIGHT: 71 IN | RESPIRATION RATE: 16 BRPM

## 2022-05-29 DIAGNOSIS — M10.472 ACUTE GOUT DUE TO OTHER SECONDARY CAUSE INVOLVING TOE OF LEFT FOOT: Primary | ICD-10-CM

## 2022-05-29 PROCEDURE — 99283 EMERGENCY DEPT VISIT LOW MDM: CPT

## 2022-05-29 PROCEDURE — 6370000000 HC RX 637 (ALT 250 FOR IP): Performed by: NURSE PRACTITIONER

## 2022-05-29 RX ORDER — HYDROCODONE BITARTRATE AND ACETAMINOPHEN 5; 325 MG/1; MG/1
1 TABLET ORAL EVERY 6 HOURS PRN
Qty: 12 TABLET | Refills: 0 | Status: SHIPPED | OUTPATIENT
Start: 2022-05-29 | End: 2022-06-01

## 2022-05-29 RX ORDER — LIDOCAINE 4 G/G
1 PATCH TOPICAL DAILY
Status: DISCONTINUED | OUTPATIENT
Start: 2022-05-29 | End: 2022-05-29 | Stop reason: HOSPADM

## 2022-05-29 RX ORDER — LIDOCAINE 4 G/G
1 PATCH TOPICAL DAILY
Qty: 30 PATCH | Refills: 0 | Status: SHIPPED | OUTPATIENT
Start: 2022-05-29 | End: 2022-06-28

## 2022-05-29 RX ORDER — PREDNISONE 20 MG/1
TABLET ORAL
Qty: 15 TABLET | Refills: 0 | Status: SHIPPED | OUTPATIENT
Start: 2022-05-29 | End: 2022-06-08

## 2022-05-29 RX ORDER — PREDNISONE 20 MG/1
60 TABLET ORAL ONCE
Status: COMPLETED | OUTPATIENT
Start: 2022-05-29 | End: 2022-05-29

## 2022-05-29 RX ADMIN — PREDNISONE 60 MG: 20 TABLET ORAL at 11:09

## 2022-05-29 ASSESSMENT — PAIN DESCRIPTION - FREQUENCY: FREQUENCY: CONTINUOUS

## 2022-05-29 ASSESSMENT — PAIN DESCRIPTION - PAIN TYPE: TYPE: ACUTE PAIN

## 2022-05-29 ASSESSMENT — PAIN - FUNCTIONAL ASSESSMENT: PAIN_FUNCTIONAL_ASSESSMENT: 0-10

## 2022-05-29 ASSESSMENT — PAIN DESCRIPTION - ORIENTATION: ORIENTATION: LEFT

## 2022-05-29 ASSESSMENT — PAIN DESCRIPTION - ONSET: ONSET: ON-GOING

## 2022-05-29 ASSESSMENT — PAIN DESCRIPTION - DESCRIPTORS: DESCRIPTORS: SHARP

## 2022-05-29 ASSESSMENT — ENCOUNTER SYMPTOMS: COLOR CHANGE: 1

## 2022-05-29 ASSESSMENT — PAIN DESCRIPTION - LOCATION: LOCATION: FOOT

## 2022-05-29 ASSESSMENT — PAIN SCALES - GENERAL: PAINLEVEL_OUTOF10: 10

## 2022-05-29 NOTE — ED PROVIDER NOTES
[facility] Emergency Department    CHIEF COMPLAINT       Chief Complaint   Patient presents with    Gout     L Foot       Nurses Notes reviewed and I agree except as noted in the HPI. HISTORY OF PRESENT ILLNESS    Soniya Cortes is a 74 y.o. female who presents to the ED for evaluation of gout. Patient has a history of gout, has developed pain to the left foot at the MTP of the greater toe. She notes symptoms began on Friday, she notes drinking alcohol on Friday which likely caused this. She denies any fevers or chills. She has been trying to keep elevated, take Tylenol. She is noted no improvement. She notes significant tenderness to touch. She has a past medical history of chronic kidney disease, DVT, PE, factor V [place]. HPI was provided by the patient. REVIEW OF SYSTEMS     Review of Systems   Constitutional: Negative for activity change, chills and fever. Musculoskeletal: Positive for arthralgias. Negative for gait problem. Skin: Positive for color change. Negative for rash and wound. Allergic/Immunologic: Negative for immunocompromised state. Neurological: Negative for weakness and numbness. Hematological: Does not bruise/bleed easily. Psychiatric/Behavioral: Negative for agitation, behavioral problems and confusion. PAST MEDICAL HISTORY     Past Medical History:   Diagnosis Date    Factor V Leiden ([facility] [address]     History of DVT (deep vein thrombosis)     multiple    History of pulmonary embolism     Stage 3a chronic kidney disease (HCC)        SURGICALHISTORY      has a past surgical history that includes shoulder surgery and CT BIOPSY RENAL (4/6/2022).     CURRENT MEDICATIONS       Discharge Medication List as of 5/29/2022 11:12 AM      CONTINUE these medications which have NOT CHANGED    Details   apixaban (ELIQUIS) 5 MG TABS tablet Take 1 tablet by mouth 2 times daily, Disp-180 tablet, R-1Normal      losartan (COZAAR) 50 MG tablet Take 1 tablet by mouth daily, Disp-90 tablet, R-1Normal      citalopram (CELEXA) 10 MG tablet Take 1 tablet by mouth daily, Disp-90 tablet, R-1Normal      Cholecalciferol (VITAMIN D3) 125 MCG (5000 UT) TABS Take 5,000 Units by mouth dailyHistorical Med      b complex vitamins capsule Take 1 capsule by mouth dailyHistorical Med      Chromium-Cinnamon (CINNAMON PLUS CHROMIUM PO) Take 2 tablets by mouth dailyHistorical Med      ZINC PO Take by mouthHistorical Med      Ascorbic Acid (VITAMIN C) 500 MG CAPS Take 500 mg by mouth dailyHistorical Med             ALLERGIES     is allergic to penicillins. FAMILY HISTORY     She indicated that her mother is . She indicated that her father is . She indicated that her sister is . She indicated that her brother is . family history includes Cancer in her brother; Coronary Art Dis in her father; GERD in her father and sister; Other (age of onset: 39) in her sister; Other (age of onset: 79) in her father. SOCIAL HISTORY       Social History     Socioeconomic History    Marital status:      Spouse name: Not on file    Number of children: Not on file    Years of education: Not on file    Highest education level: Not on file   Occupational History    Not on file   Tobacco Use    Smoking status: Never Smoker    Smokeless tobacco: Never Used   Substance and Sexual Activity    Alcohol use: Yes     Comment: rarely    Drug use: Never    Sexual activity: Not on file   Other Topics Concern    Not on file   Social History Narrative    Not on file     Social Determinants of Health     Financial Resource Strain: Low Risk     Difficulty of Paying Living Expenses: Not hard at all   Food Insecurity: No Food Insecurity    Worried About Running Out of Food in the Last Year: Never true    [address] in the Last Year: Never true   Transportation Needs:     Lack of Transportation (Medical): Not on file    Lack of Transportation (Non-Medical):  Not on file Physical Activity:     Days of Exercise per Week: Not on file    Minutes of Exercise per Session: Not on file   Stress:     Feeling of Stress : Not on file   Social Connections:     Frequency of Communication with Friends and Family: Not on file    Frequency of Social Gatherings with Friends and Family: Not on file    Attends [employer] Services: Not on file    Active Member of [address] [place] [address] or Organizations: Not on file    Attends Club or Organization Meetings: Not on file    Marital Status: Not on file   Intimate Partner Violence:     Fear of Current or Ex-Partner: Not on file    Emotionally Abused: Not on file    Physically Abused: Not on file    Sexually Abused: Not on file   Housing Stability:     Unable to Pay for Housing in the Last Year: Not on file    Number of [place] in the Last Year: Not on file    Unstable Housing in the Last Year: Not on file       PHYSICAL EXAM     INITIAL VITALS:  height is 5' 10.5\" (1.791 m) and weight is 180 lb (81.6 kg). Her oral temperature is 97.5 °F (36.4 °C). Her blood pressure is 141/82 (abnormal) and her pulse is 87. Her respiration is 16 and oxygen saturation is 99%. Physical Exam  Vitals and nursing note reviewed. Constitutional:       Appearance: Normal appearance. She is well-developed. HENT:      Head: Normocephalic. Mouth/Throat:      Pharynx: Uvula midline. Eyes:      Conjunctiva/sclera: Conjunctivae normal.   Cardiovascular:      Rate and Rhythm: Normal rate and regular rhythm. Heart sounds: Normal heart sounds, S1 normal and S2 normal.   Pulmonary:      Effort: Pulmonary effort is normal. No respiratory distress. Breath sounds: Normal breath sounds. Chest:      Chest wall: No tenderness. Abdominal:      General: Bowel sounds are normal. There is no distension. Palpations: Abdomen is soft. Tenderness: There is no abdominal tenderness. Musculoskeletal:      Cervical back: Normal range of motion and neck supple. Left foot: Decreased range of motion. Normal capillary refill. Swelling and tenderness present. No deformity or bony tenderness. Normal pulse. Lymphadenopathy:      Cervical: No cervical adenopathy. Skin:     General: Skin is warm and dry. Neurological:      Mental Status: She is alert and oriented to person, place, and time. Psychiatric:         Speech: Speech normal.         Behavior: Behavior normal.         Thought Content: Thought content normal.         DIFFERENTIAL DIAGNOSIS:   Gout, septic arthritis, colitis,    DIAGNOSTIC RESULTS       RADIOLOGY: non-plainfilm images(s) such as CT, Ultrasound and MRI are read by the radiologist.  Plain radiographic images are visualized and preliminarily interpreted by the emergency physician unless otherwise stated below. No orders to display         LABS:   Labs Reviewed - No data to display    EMERGENCY DEPARTMENT COURSE:   Vitals:    Vitals:    05/29/22 1028   BP: (!) 141/82   Pulse: 87   Resp: 16   Temp: 97.5 °F (36.4 °C)   TempSrc: Oral   SpO2: 99%   Weight: 180 lb (81.6 kg)   Height: 5' 10.5\" (1.791 m)       MDM    Patient was seen and evaluated in the emergency department, patient appears to be in no acute distress, vital signs reviewed, no significant findings noted. Physical exam is completed, significant redness, tenderness noted to the left MTP, appears to be consistent with gout, patient has had this before. Patient drove here, will write prescription for narcotics to be picked up from pharmacy, started her on prednisone, give lidocaine patch. Advised to return to the ER with worsening symptoms. They verbalized understanding of plan of care. Medications   predniSONE (DELTASONE) tablet 60 mg (60 mg Oral Given 5/29/22 1109)       Patient was seenindependently by myself. The patient's final impression and disposition and plan was determined by myself. CRITICAL CARE:   None    CONSULTS:  None    PROCEDURES:  None    FINAL IMPRESSION     1.  Acute gout due to other secondary cause involving toe of left foot          DISPOSITION/PLAN   Patient discharge  PATIENT REFERREDTO:  Jhony Lynn MD  [facility], [address]  [address] [facility] [phone]    Call   If symptoms worsen, For follow up and evaluation      DISCHARGE MEDICATIONS:  Discharge Medication List as of 5/29/2022 11:12 AM      START taking these medications    Details   HYDROcodone-acetaminophen (NORCO) 5-325 MG per tablet Take 1 tablet by mouth every 6 hours as needed for Pain for up to 3 days. Intended supply: 3 days. Take lowest dose possible to manage pain, Disp-12 tablet, R-0Normal      predniSONE (DELTASONE) 20 MG tablet Take 60 mg (3 tabs) once daily for 3 days then 40 mg (2 tabs) once daily for 3 days then 20 mg (1 tab) once daily for 3 days, Disp-15 tablet, R-0Normal      lidocaine 4 % external patch Place 1 patch onto the skin daily, TransDERmal, DAILY Starting Sun 5/29/2022, Until Tue 6/28/2022, For 30 days, Disp-30 patch, R-0, Normal             (Please note that portions of this note were completed with a voice recognition program.  Efforts were made to edit the dictations but occasionally words are mis-transcribed.)    Provider:  I personally performed the services described in the documentation,reviewed and edited the documentation which was dictated to the scribe in my presence, and it accurately records my words and actions.     Santy Perez CNP 05/29/22 1:20 PM    Emily Perez, APRN - CNP        [employer], APREDMUND - CNP  05/29/22 5893

## 2022-05-29 NOTE — ED NOTES
Pt presents to the ER for L foot pain. Pt states that she has had gout in her foot before and it feels the same. L foot is red and swollen.      [place]  05/29/22 1035

## 2022-06-07 ENCOUNTER — HOSPITAL ENCOUNTER (OUTPATIENT)
Age: 67
Discharge: HOME | End: 2022-06-07
Payer: MEDICARE

## 2022-06-07 DIAGNOSIS — N18.31 STAGE 3A CHRONIC KIDNEY DISEASE (HCC): ICD-10-CM

## 2022-06-07 LAB
ANION GAP SERPL CALCULATED.3IONS-SCNC: 11 MEQ/L (ref 8–16)
BUN BLDV-MCNC: 19 MG/DL (ref 7–22)
CALCIUM SERPL-MCNC: 9.3 MG/DL (ref 8.5–10.5)
CHLORIDE BLD-SCNC: 101 MEQ/L (ref 98–111)
CO2: 25 MEQ/L (ref 23–33)
CREAT SERPL-MCNC: 1.3 MG/DL (ref 0.4–1.2)
GFR SERPL CREATININE-BSD FRML MDRD: 41 ML/MIN/1.73M2
GLUCOSE BLD-MCNC: 90 MG/DL (ref 70–108)
POTASSIUM SERPL-SCNC: 5.1 MEQ/L (ref 3.5–5.2)
SODIUM BLD-SCNC: 137 MEQ/L (ref 135–145)

## 2022-06-07 PROCEDURE — 80048 BASIC METABOLIC PNL TOTAL CA: CPT

## 2022-06-07 PROCEDURE — 36415 COLL VENOUS BLD VENIPUNCTURE: CPT

## 2022-06-16 ENCOUNTER — OFFICE VISIT (OUTPATIENT)
Dept: NEPHROLOGY | Age: 67
End: 2022-06-16
Payer: MEDICARE

## 2022-06-16 VITALS
BODY MASS INDEX: 25.18 KG/M2 | HEART RATE: 80 BPM | OXYGEN SATURATION: 98 % | DIASTOLIC BLOOD PRESSURE: 70 MMHG | SYSTOLIC BLOOD PRESSURE: 128 MMHG | WEIGHT: 178 LBS

## 2022-06-16 DIAGNOSIS — R31.29 MICROSCOPIC HEMATURIA: Primary | ICD-10-CM

## 2022-06-16 DIAGNOSIS — N18.31 STAGE 3A CHRONIC KIDNEY DISEASE (HCC): ICD-10-CM

## 2022-06-16 DIAGNOSIS — R80.8 OTHER PROTEINURIA: ICD-10-CM

## 2022-06-16 PROCEDURE — G8400 PT W/DXA NO RESULTS DOC: HCPCS | Performed by: INTERNAL MEDICINE

## 2022-06-16 PROCEDURE — 99213 OFFICE O/P EST LOW 20 MIN: CPT | Performed by: INTERNAL MEDICINE

## 2022-06-16 PROCEDURE — 1123F ACP DISCUSS/DSCN MKR DOCD: CPT | Performed by: INTERNAL MEDICINE

## 2022-06-16 PROCEDURE — G8417 CALC BMI ABV UP PARAM F/U: HCPCS | Performed by: INTERNAL MEDICINE

## 2022-06-16 PROCEDURE — 3017F COLORECTAL CA SCREEN DOC REV: CPT | Performed by: INTERNAL MEDICINE

## 2022-06-16 PROCEDURE — G8427 DOCREV CUR MEDS BY ELIG CLIN: HCPCS | Performed by: INTERNAL MEDICINE

## 2022-06-16 PROCEDURE — 1036F TOBACCO NON-USER: CPT | Performed by: INTERNAL MEDICINE

## 2022-06-16 PROCEDURE — 1090F PRES/ABSN URINE INCON ASSESS: CPT | Performed by: INTERNAL MEDICINE

## 2022-06-16 NOTE — PROGRESS NOTES
[address] [facility]  [address]  [facility] [address]  Dept: [phone]  Loc: [phone]  Progress Note  6/16/2022 12:55 PM      Pt Name:    Seven Abebe  YOB: 1955  Primary Care Physician:  Elaine Singh MD     Chief Complaint:   Chief Complaint   Patient presents with    Follow-up     CKD III        History of Present Illness: This is a follow-up visit for CKD III, HTN. She has hx of DVT/PE , factor V Leidin. Serum creatinine 1.0-1.2 mg/dL.      Work up revealed proteinuria 730 mg and microscopic hematuria with + KSENIA, 1:160. 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 BP is controlled. She is trying to lose weight. No edema. No frothy urine. Pertinent items are noted in HPI. Past History:  Past Medical History:   Diagnosis Date    Factor V Leiden ([address]     History of DVT (deep vein thrombosis)     multiple    History of pulmonary embolism     Stage 3a chronic kidney disease (HCC)      Past Surgical History:   Procedure Laterality Date    CT BIOPSY RENAL  4/6/2022    CT BIOPSY RENAL 4/6/2022 [facility] CT SCAN    SHOULDER SURGERY          VITALS:  /70 (Site: Right Upper Arm, Position: Sitting, Cuff Size: Large Adult)   Pulse 80   Wt 178 lb (80.7 kg)   SpO2 98%   BMI 25.18 kg/m²   Wt Readings from Last 3 Encounters:   06/16/22 178 lb (80.7 kg)   05/29/22 180 lb (81.6 kg)   03/15/22 181 lb (82.1 kg)     Body mass index is 25.18 kg/m².      General Appearance: alert and cooperative with exam, appears comfortable, no distress  HEENT: EOMI, moist oral mucus membranes  Neck: No jugular venous distention,   Lungs: Air entry B/L, no crackles or rales, no use of accessory muscles  Heart: S1, S2 heard, no rub  GI: soft, non-tender, no guarding  Extremities: No sig LE edema, no cyanosis  Skin: warm, dry  Neurologic: no tremor, no asterixis, no focal neurologic deficits     Medications:  Current Outpatient Medications   Medication Sig Dispense Refill    lidocaine 4 % external patch Place 1 patch onto the skin daily 30 patch 0    apixaban (ELIQUIS) 5 MG TABS tablet Take 1 tablet by mouth 2 times daily 180 tablet 1    losartan (COZAAR) 50 MG tablet Take 1 tablet by mouth daily 90 tablet 1    citalopram (CELEXA) 10 MG tablet Take 1 tablet by mouth daily 90 tablet 1    Cholecalciferol (VITAMIN D3) 125 MCG (5000 UT) TABS Take 5,000 Units by mouth daily      b complex vitamins capsule Take 1 capsule by mouth daily      Chromium-Cinnamon (CINNAMON PLUS CHROMIUM PO) Take 2 tablets by mouth daily      ZINC PO Take by mouth      Ascorbic Acid (VITAMIN C) 500 MG CAPS Take 500 mg by mouth daily       No current facility-administered medications for this visit.         Laboratory & Diagnostics:  CBC:   Lab Results   Component Value Date    WBC 7.1 04/20/2022    HGB 13.2 04/20/2022    HCT 41.8 04/20/2022    MCV 99.1 (H) 04/20/2022     04/20/2022     BMP:    Lab Results   Component Value Date     06/07/2022     02/23/2022     11/24/2021    K 5.1 06/07/2022    K 4.7 02/23/2022    K 4.3 11/24/2021     06/07/2022     02/23/2022     11/24/2021    CO2 25 06/07/2022    CO2 26 02/23/2022    CO2 26 11/24/2021    BUN 19 06/07/2022    BUN 23 (H) 02/23/2022    BUN 22 11/24/2021    CREATININE 1.3 (H) 06/07/2022    CREATININE 1.0 04/06/2022    CREATININE 1.0 02/23/2022    GLUCOSE 90 06/07/2022    GLUCOSE 114 (H) 02/23/2022    GLUCOSE 99 11/24/2021      Hepatic: No results found for: AST, ALT, ALB, BILITOT, ALKPHOS  BNP: No results found for: BNP  Lipids:   Lab Results   Component Value Date    CHOL 265 (H) 02/23/2022    HDL 64 02/23/2022     INR: No results found for: INR  URINE: No results found for: Seda Mei  Lab Results   Component Value Date    NITRU NEGATIVE 03/21/2022    COLORU YELLOW 03/21/2022    PHUR 5.0 03/21/2022    LABCAST NONE SEEN 03/21/2022    LABCAST NONE SEEN 03/21/2022    WBCUA 0-2 03/21/2022    RBCUA 10-15 03/21/2022    YEAST NONE SEEN 03/21/2022    BACTERIA NONE SEEN 03/21/2022    SPECGRAV 1.014 03/21/2022    LEUKOCYTESUR TRACE 03/21/2022    UROBILINOGEN 0.2 03/21/2022    BILIRUBINUR NEGATIVE 03/21/2022    BLOODU LARGE 03/21/2022    GLUCOSEU NEGATIVE 11/26/2021    KETUA NEGATIVE 03/21/2022      Microalbumen/Creatinine ratio:  No components found for: RUCREAT        Media Information                         Document Information    Procedure Report   4.6.22 [facility] renal bx path report   04/13/2022   Attached To:   Abstract on 4/13/22 with Bryan Martinez DO     Source Information    Brian Conti CMA  [facility]       Impression/Plan:   1. CKD III, s/p renal biopsy showing glomerulomegaly , thin basement membrane disease . Goals of care include slowing rate of progression by controlling blood pressure and by avoiding nephrotoxins such as NSAIDs and IV contrast.   2. Proteinuria from glomerulomegaly: discussed BP control, low sodium diet, and protein intake <0.8 grams/kg per day around 65 grams per patient. She does keto diet  3.thin basement membrane disease  4. HTN     Bloodwork and medications were reviewed and plan of care discussed with the patient. Return to clinic in 1 year  or sooner if the need arises.       Vi Rodgers DO  [facility]

## 2022-08-04 ENCOUNTER — OFFICE VISIT (OUTPATIENT)
Dept: INTERNAL MEDICINE CLINIC | Age: 67
End: 2022-08-04
Payer: MEDICARE

## 2022-08-04 VITALS
HEART RATE: 80 BPM | BODY MASS INDEX: 25.37 KG/M2 | HEIGHT: 71 IN | TEMPERATURE: 97.8 F | WEIGHT: 181.2 LBS | DIASTOLIC BLOOD PRESSURE: 70 MMHG | SYSTOLIC BLOOD PRESSURE: 130 MMHG

## 2022-08-04 DIAGNOSIS — H91.93 BILATERAL HEARING LOSS, UNSPECIFIED HEARING LOSS TYPE: Primary | ICD-10-CM

## 2022-08-04 PROCEDURE — 1123F ACP DISCUSS/DSCN MKR DOCD: CPT | Performed by: INTERNAL MEDICINE

## 2022-08-04 PROCEDURE — G8427 DOCREV CUR MEDS BY ELIG CLIN: HCPCS | Performed by: INTERNAL MEDICINE

## 2022-08-04 PROCEDURE — 1090F PRES/ABSN URINE INCON ASSESS: CPT | Performed by: INTERNAL MEDICINE

## 2022-08-04 PROCEDURE — G8417 CALC BMI ABV UP PARAM F/U: HCPCS | Performed by: INTERNAL MEDICINE

## 2022-08-04 PROCEDURE — G8400 PT W/DXA NO RESULTS DOC: HCPCS | Performed by: INTERNAL MEDICINE

## 2022-08-04 PROCEDURE — 99212 OFFICE O/P EST SF 10 MIN: CPT | Performed by: INTERNAL MEDICINE

## 2022-08-04 PROCEDURE — 1036F TOBACCO NON-USER: CPT | Performed by: INTERNAL MEDICINE

## 2022-08-04 PROCEDURE — 3017F COLORECTAL CA SCREEN DOC REV: CPT | Performed by: INTERNAL MEDICINE

## 2022-08-04 ASSESSMENT — PATIENT HEALTH QUESTIONNAIRE - PHQ9
SUM OF ALL RESPONSES TO PHQ QUESTIONS 1-9: 0
3. TROUBLE FALLING OR STAYING ASLEEP: 0
SUM OF ALL RESPONSES TO PHQ9 QUESTIONS 1 & 2: 0
8. MOVING OR SPEAKING SO SLOWLY THAT OTHER PEOPLE COULD HAVE NOTICED. OR THE OPPOSITE, BEING SO FIGETY OR RESTLESS THAT YOU HAVE BEEN MOVING AROUND A LOT MORE THAN USUAL: 0
7. TROUBLE CONCENTRATING ON THINGS, SUCH AS READING THE NEWSPAPER OR WATCHING TELEVISION: 0
5. POOR APPETITE OR OVEREATING: 0
SUM OF ALL RESPONSES TO PHQ QUESTIONS 1-9: 0
9. THOUGHTS THAT YOU WOULD BE BETTER OFF DEAD, OR OF HURTING YOURSELF: 0
4. FEELING TIRED OR HAVING LITTLE ENERGY: 0
1. LITTLE INTEREST OR PLEASURE IN DOING THINGS: 0
2. FEELING DOWN, DEPRESSED OR HOPELESS: 0
SUM OF ALL RESPONSES TO PHQ QUESTIONS 1-9: 0
10. IF YOU CHECKED OFF ANY PROBLEMS, HOW DIFFICULT HAVE THESE PROBLEMS MADE IT FOR YOU TO DO YOUR WORK, TAKE CARE OF THINGS AT HOME, OR GET ALONG WITH OTHER PEOPLE: 0
SUM OF ALL RESPONSES TO PHQ QUESTIONS 1-9: 0
6. FEELING BAD ABOUT YOURSELF - OR THAT YOU ARE A FAILURE OR HAVE LET YOURSELF OR YOUR FAMILY DOWN: 0
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
## 2022-08-11 ENCOUNTER — HOSPITAL ENCOUNTER (OUTPATIENT)
Dept: AUDIOLOGY | Age: 67
Discharge: HOME | End: 2022-08-11
Payer: MEDICARE

## 2022-08-11 PROCEDURE — 92567 TYMPANOMETRY: CPT | Performed by: AUDIOLOGIST

## 2022-08-11 PROCEDURE — 92557 COMPREHENSIVE HEARING TEST: CPT | Performed by: AUDIOLOGIST

## 2022-08-11 NOTE — PROGRESS NOTES
AUDIOLOGICAL EVALUATION      REASON FOR TESTING:  Audiometric evaluation per the request of Rianna Montelongo MD, due to the diagnosis of bilateral hearing loss. The patient reports a strong family history of hearing loss. She notes a previous hearing test performed at another location approximately 7 years ago revealed hearing loss and hearing aids were recommended but she did not pursue amplification at that time. She reports increasing difficulty hearing and understanding conversational speech, worse in the right ear, particularly in group settings and in the presence of background. 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 The patient also notes a previous injury to the right ear which reportedly affected the right tympanic membrane. She denies any history of ear infections or previous ear surgery. She denies any current otalgia, otorrhea, aural fullness, tinnitus or vertigo. OTOSCOPY: Clear external ear canals, bilaterally. AUDIOGRAM          Reliability: Good    COMMENTS: Pure tone audiogram indicates a mild to moderate sensorineural hearing loss in the left ear and a mild to moderately-severe mixed hearing loss in the right ear. Air-bone gaps noted on the right at 500-1000Hz. Speech reception thresholds consistent with pure tone averages, bilaterally. Word recognition scores are excellent at 100%, bilaterally, with speech presented at elevated but comfortable listening levels in quiet. Tympanometry indicated a normal ear canal volume and peak pressure with slightly hypercompliant tympanic membrane mobility (1.5ml) in the left ear. The right tympanogram showed a normal canal volume and peak pressure with hypercompliant tympanic membrane mobility (4.1ml). RECOMMENDATION(S):   Follow up with referring provider as planned. Consider ENT consult due to right mixed hearing loss and hypercompliant tympanogram, per discretion of physician.    Consider binaural amplification pending medical clearance and patient motivation. Patient advised to contact insurance carrier for in-network hearing aid services providers. Hearing protection in noise.

## 2022-08-18 ENCOUNTER — OFFICE VISIT (OUTPATIENT)
Dept: INTERNAL MEDICINE CLINIC | Age: 67
End: 2022-08-18

## 2022-08-18 VITALS
BODY MASS INDEX: 25.54 KG/M2 | SYSTOLIC BLOOD PRESSURE: 130 MMHG | TEMPERATURE: 97.7 F | HEIGHT: 71 IN | HEART RATE: 72 BPM | WEIGHT: 182.4 LBS | DIASTOLIC BLOOD PRESSURE: 82 MMHG

## 2022-08-18 DIAGNOSIS — H91.93 BILATERAL HEARING LOSS, UNSPECIFIED HEARING LOSS TYPE: ICD-10-CM

## 2022-08-18 DIAGNOSIS — Z78.0 ASYMPTOMATIC MENOPAUSAL STATE: ICD-10-CM

## 2022-08-18 DIAGNOSIS — I10 PRIMARY HYPERTENSION: ICD-10-CM

## 2022-08-18 DIAGNOSIS — B02.9 HERPES ZOSTER WITHOUT COMPLICATION: ICD-10-CM

## 2022-08-18 DIAGNOSIS — Z00.00 INITIAL MEDICARE ANNUAL WELLNESS VISIT: Primary | ICD-10-CM

## 2022-08-18 DIAGNOSIS — Z12.31 ENCOUNTER FOR SCREENING MAMMOGRAM FOR BREAST CANCER: ICD-10-CM

## 2022-08-18 RX ORDER — LOSARTAN POTASSIUM 50 MG/1
50 TABLET ORAL DAILY
Qty: 90 TABLET | Refills: 1 | Status: SHIPPED | OUTPATIENT
Start: 2022-08-18

## 2022-08-18 RX ORDER — VALACYCLOVIR HYDROCHLORIDE 1 G/1
1000 TABLET, FILM COATED ORAL 3 TIMES DAILY
Qty: 21 TABLET | Refills: 0 | Status: SHIPPED | OUTPATIENT
Start: 2022-08-18 | End: 2022-08-25

## 2022-08-18 ASSESSMENT — ENCOUNTER SYMPTOMS
PHOTOPHOBIA: 0
COUGH: 0
COLOR CHANGE: 0
BLOOD IN STOOL: 0
SHORTNESS OF BREATH: 0
ABDOMINAL PAIN: 0
RHINORRHEA: 0
TROUBLE SWALLOWING: 0

## 2022-08-18 ASSESSMENT — PATIENT HEALTH QUESTIONNAIRE - PHQ9
SUM OF ALL RESPONSES TO PHQ QUESTIONS 1-9: 0
8. MOVING OR SPEAKING SO SLOWLY THAT OTHER PEOPLE COULD HAVE NOTICED. OR THE OPPOSITE, BEING SO FIGETY OR RESTLESS THAT YOU HAVE BEEN MOVING AROUND A LOT MORE THAN USUAL: 0
5. POOR APPETITE OR OVEREATING: 0
4. FEELING TIRED OR HAVING LITTLE ENERGY: 0
SUM OF ALL RESPONSES TO PHQ QUESTIONS 1-9: 0
1. LITTLE INTEREST OR PLEASURE IN DOING THINGS: 0
9. THOUGHTS THAT YOU WOULD BE BETTER OFF DEAD, OR OF HURTING YOURSELF: 0
SUM OF ALL RESPONSES TO PHQ QUESTIONS 1-9: 0
SUM OF ALL RESPONSES TO PHQ9 QUESTIONS 1 & 2: 0
7. TROUBLE CONCENTRATING ON THINGS, SUCH AS READING THE NEWSPAPER OR WATCHING TELEVISION: 0
SUM OF ALL RESPONSES TO PHQ QUESTIONS 1-9: 0
10. IF YOU CHECKED OFF ANY PROBLEMS, HOW DIFFICULT HAVE THESE PROBLEMS MADE IT FOR YOU TO DO YOUR WORK, TAKE CARE OF THINGS AT HOME, OR GET ALONG WITH OTHER PEOPLE: 0
6. FEELING BAD ABOUT YOURSELF - OR THAT YOU ARE A FAILURE OR HAVE LET YOURSELF OR YOUR FAMILY DOWN: 0
3. TROUBLE FALLING OR STAYING ASLEEP: 0
2. FEELING DOWN, DEPRESSED OR HOPELESS: 0

## 2022-08-18 ASSESSMENT — LIFESTYLE VARIABLES
HOW OFTEN DO YOU HAVE A DRINK CONTAINING ALCOHOL: MONTHLY OR LESS
HOW OFTEN DO YOU HAVE A DRINK CONTAINING ALCOHOL: MONTHLY OR LESS
HOW MANY STANDARD DRINKS CONTAINING ALCOHOL DO YOU HAVE ON A TYPICAL DAY: 1 OR 2

## 2022-08-18 NOTE — PROGRESS NOTES
Medicare Annual Wellness Visit    Wyatt Cotton is here for Medicare AWV    Assessment & Plan   Initial Medicare annual wellness visit  Asymptomatic menopausal state  -     DEXA Bone Density Axial Skeleton; Future  Encounter for screening mammogram for breast cancer  -     MILTON Digital Screen Bilateral; Future  Herpes zoster without complication  -     valACYclovir (VALTREX) 1 g tablet; Take 1 tablet by mouth 3 times daily for 7 days, Disp-21 tablet, R-0Normal  Bilateral hearing loss, unspecified hearing loss type  -     Светлана Ramos MD, Otolaryngology, [facility]  Primary hypertension  -     losartan (COZAAR) 50 MG tablet; Take 1 tablet by mouth daily, Disp-90 tablet, R-1Normal    Recommendations for Preventive Services Due: see orders and patient instructions/AVS.  Recommended screening schedule for the next 5-10 years is provided to the patient in written form: see Patient Instructions/AVS.     Return in 6 months (on 2/18/2023) for Medicare Annual Wellness Visit in 1 year. Subjective     The following acute and/or chronic problems were also addressed today:  Right flank painful rash with excoriations as of 4 days ago, patient attributes to spider bite. Thinks rash is expanding since. Patient's complete Health Risk Assessment and screening values have been reviewed and are found in Flowsheets. The following problems were reviewed today and where indicated follow up appointments were made and/or referrals ordered.     Positive Risk Factor Screenings with Interventions:             General Health and ACP:  General  In general, how would you say your health is?: Very Good  In the past 7 days, have you experienced any of the following: New or Increased Pain, New or Increased Fatigue, Loneliness, Social Isolation, Stress or Anger?: No  Do you get the social and emotional support that you need?: Yes  Do you have a Living Will?: (!) No    Advance Directives       Power of [place] [employer] Will ACP-Advance Directive ACP-Power of     Not on File Not on File Not on File Not on File          General Health Risk Interventions:  No Living Will: Advance Care Planning addressed with patient today - patient will think about assigning someone. Wishes her brother to be her medical decision maker, not her children who are her next of kin. Discussed getting DEXA scan done, patient agreeable. Declines to pursue screening colonoscopy at this time. Discussed getting vaccines done after clearing active shingles. Had audiology visit showing right ear mixed hearing loss, referred to ENT. Health Habits/Nutrition:  Physical Activity: Inactive    Days of Exercise per Week: 0 days    Minutes of Exercise per Session: 0 min     Have you lost any weight without trying in the past 3 months?: No  Body mass index: (!) 25.79  Have you seen the dentist within the past year?: (!) No  Health Habits/Nutrition Interventions:  Inadequate physical activity:  patient agrees to increase physical activity as follows: walking 3-5 times weekly  Dental exam overdue:  patient encouraged to make appointment with his/her dentist    Hearing/Vision:  Do you or your family notice any trouble with your hearing that hasn't been managed with hearing aids?: (!) Yes (reading)  Do you have difficulty driving, watching TV, or doing any of your daily activities because of your eyesight?: No     No results found. Hearing/Vision Interventions:  Hearing concerns:  ENT referral provided            Objective   Vitals:    08/18/22 1412 08/18/22 1535   BP: (!) 156/80 130/82   Site: Left Upper Arm    Pulse: 72    Temp: 97.7 °F (36.5 °C)    Weight: 182 lb 6.4 oz (82.7 kg)    Height: 5' 10.51\" (1.791 m)       Body mass index is 25.79 kg/m². Physical Exam  Vitals and nursing note reviewed. Constitutional:       Appearance: Normal appearance. HENT:      Head: Normocephalic and atraumatic.       Right Ear: External ear normal.      Left Ear: External ear normal.      Nose: Nose normal.      Mouth/Throat:      Pharynx: Oropharynx is clear. Eyes:      Extraocular Movements: Extraocular movements intact. Cardiovascular:      Rate and Rhythm: Normal rate and regular rhythm. Pulses: Normal pulses. Heart sounds: Normal heart sounds. Pulmonary:      Effort: Pulmonary effort is normal.      Breath sounds: Normal breath sounds. Abdominal:      General: Abdomen is flat. Palpations: Abdomen is soft. Musculoskeletal:         General: No deformity. Normal range of motion. Cervical back: Normal range of motion and neck supple. Skin:     General: Skin is warm and dry. Capillary Refill: Capillary refill takes less than 2 seconds. Findings: Rash present. Comments: Right flank vesicular rash in dermatomal distribution, excoriations, clear serosanguineous discharge from open vesicles. Neurological:      General: No focal deficit present. Mental Status: She is alert. Mental status is at baseline. Psychiatric:         Mood and Affect: Mood normal.         Behavior: Behavior normal.       Allergies   Allergen Reactions    Other      Cotton wood    Penicillins      Prior to Visit Medications    Medication Sig Taking?  Authorizing Provider   valACYclovir (VALTREX) 1 g tablet Take 1 tablet by mouth 3 times daily for 7 days Yes Tiffani Hurd MD   losartan (COZAAR) 50 MG tablet Take 1 tablet by mouth daily Yes Tiffani Hurd MD   apixaban (ELIQUIS) 5 MG TABS tablet Take 1 tablet by mouth 2 times daily Yes Tiffani Hurd MD   Cholecalciferol (VITAMIN D3) 125 MCG (5000 UT) TABS Take 5,000 Units by mouth daily Yes Historical Provider, MD   b complex vitamins capsule Take 1 capsule by mouth daily Yes Historical Provider, MD   Chromium-Cinnamon (CINNAMON PLUS CHROMIUM PO) Take 2 tablets by mouth daily Yes Historical Provider, MD   ZINC PO Take by mouth Yes Historical Provider, MD   Ascorbic Acid (VITAMIN C) 500 MG CAPS Take 500 mg by mouth daily Yes Historical Provider, MD Landa (Including outside providers/suppliers regularly involved in providing care):   Patient Care Team:  Lance Hein MD as PCP - General (Internal Medicine)  Lance Hein MD as PCP - [facility] Empaneled Provider  Amilcar Boothe DO as Consulting Physician (Nephrology)     Reviewed and updated this visit:  Allergies  Meds  Problems                Advance Care Planning   Advanced Care Planning: Discussed the patients choices for care and treatment in case of a health event that adversely affects decision-making abilities. Also discussed the patients long-term treatment options. Reviewed with the patient the appropriate state-specific advance directive documents. Reviewed the process of designating a competent adult as an Agent (or -in-fact) that could take make health care decisions for the patient if incompetent. Patient was asked to complete the declaration forms, either acknowledge the forms by a public notary or an eligible witness and provide a signed copy to the practice office. Time spent (minutes): 45 minutes               1955    Chief Complaint   Patient presents with    Medicare AW   Chief complaint for this portion of note is rash and hearing loss. Pt is a 69 y.o. female who presents for an follow up visit with acute complaint of right flank painful rash and hearing loss right ear per audiology evaluation. HPI    Patient presents with 4-day history of painful rash on right flank that started while she was working on her garden. She attributes rash likely from spider bite. According to the patient rash feels like it is expanding. There is notable blistering, crusting and excoriations noted. With multiple vesicular areas. Patient denies fever, trauma, visual symptoms or neurologic problems. Additionally, since last visit patient had audiology evaluation.   Which notes a right-sided mixed hearing loss with recommendation for ENT referral.  She was previously recommended a hearing aid, however audiology could not set up that device, they advised to contact insurance to discuss covered providers for that hearing aid. Past Medical History:   Diagnosis Date    Bilateral hearing loss     Factor V Leiden ([address]     History of DVT (deep vein thrombosis)     multiple    History of pulmonary embolism     Stage 3a chronic kidney disease (HCC)        Past Surgical History:   Procedure Laterality Date    CT BIOPSY RENAL  4/6/2022    CT BIOPSY RENAL 4/6/2022 [facility] CT SCAN    SHOULDER SURGERY         Current Outpatient Medications   Medication Sig Dispense Refill    valACYclovir (VALTREX) 1 g tablet Take 1 tablet by mouth 3 times daily for 7 days 21 tablet 0    losartan (COZAAR) 50 MG tablet Take 1 tablet by mouth daily 90 tablet 1    apixaban (ELIQUIS) 5 MG TABS tablet Take 1 tablet by mouth 2 times daily 180 tablet 1    Cholecalciferol (VITAMIN D3) 125 MCG (5000 UT) TABS Take 5,000 Units by mouth daily      b complex vitamins capsule Take 1 capsule by mouth daily      Chromium-Cinnamon (CINNAMON PLUS CHROMIUM PO) Take 2 tablets by mouth daily      ZINC PO Take by mouth      Ascorbic Acid (VITAMIN C) 500 MG CAPS Take 500 mg by mouth daily       No current facility-administered medications for this visit.        Allergies   Allergen Reactions    Other      Cotton wood    Penicillins        Social History     Socioeconomic History    Marital status:      Spouse name: Not on file    Number of children: Not on file    Years of education: Not on file    Highest education level: Not on file   Occupational History    Not on file   Tobacco Use    Smoking status: Never    Smokeless tobacco: Never   Substance and Sexual Activity    Alcohol use: Yes     Comment: rarely    Drug use: Never    Sexual activity: Not on file   Other Topics Concern    Not on file   Social History Narrative    Not on file     Social Determinants of Health     Financial Resource Strain: Low Risk     Difficulty of Paying Living Expenses: Not hard at all   Food Insecurity: No Food Insecurity    Worried About Running Out of Food in the Last Year: Never true    Ran Out of Food in the Last Year: Never true   Transportation Needs: Not on file   Physical Activity: Inactive    Days of Exercise per Week: 0 days    Minutes of Exercise per Session: 0 min   Stress: Not on file   Social Connections: Not on file   Intimate Partner Violence: Not on file   Housing Stability: Not on file       Family History   Problem Relation Age of Onset    Coronary Art Dis Father     GERD Father     Other Father 79        ESRD, hearing los    Other Sister 39        ESRD    GERD Sister     Hearing Loss Sister     Cancer Brother     Hearing Loss Brother     Hearing Loss Brother        Review of Systems   Constitutional:  Negative for fatigue and fever. HENT:  Positive for hearing loss. Negative for rhinorrhea and trouble swallowing. Eyes:  Negative for photophobia and visual disturbance. Respiratory:  Negative for cough and shortness of breath. Cardiovascular:  Negative for chest pain and leg swelling. Gastrointestinal:  Negative for abdominal pain and blood in stool. Endocrine: Negative for cold intolerance and heat intolerance. Genitourinary:  Negative for flank pain and hematuria. Musculoskeletal:  Negative for arthralgias and myalgias. Skin:  Positive for rash. Negative for color change and wound. Allergic/Immunologic: Negative for immunocompromised state. Neurological:  Negative for syncope and headaches. Hematological:  Negative for adenopathy. Does not bruise/bleed easily. Psychiatric/Behavioral:  Negative for agitation and hallucinations. Blood pressure 130/82, pulse 72, temperature 97.7 °F (36.5 °C), height 5' 10.51\" (1.791 m), weight 182 lb 6.4 oz (82.7 kg). Physical Exam  Vitals and nursing note reviewed.    Constitutional:       Appearance: Normal appearance. HENT:      Head: Normocephalic and atraumatic. Right Ear: External ear normal.      Left Ear: External ear normal.      Nose: Nose normal.      Mouth/Throat:      Pharynx: Oropharynx is clear. Eyes:      Extraocular Movements: Extraocular movements intact. Cardiovascular:      Rate and Rhythm: Normal rate and regular rhythm. Pulses: Normal pulses. Heart sounds: Normal heart sounds. Pulmonary:      Effort: Pulmonary effort is normal.      Breath sounds: Normal breath sounds. Abdominal:      General: Abdomen is flat. Palpations: Abdomen is soft. Musculoskeletal:         General: No deformity. Normal range of motion. Cervical back: Normal range of motion and neck supple. Skin:     General: Skin is warm and dry. Capillary Refill: Capillary refill takes less than 2 seconds. Findings: Rash present. Comments: Right flank vesicular rash in dermatomal distribution, excoriations, clear serosanguineous discharge from open vesicles. Neurological:      General: No focal deficit present. Mental Status: She is alert. Mental status is at baseline. Psychiatric:         Mood and Affect: Mood normal.         Behavior: Behavior normal.       Diagnostic data:   I have reviewed recent diagnostic testing including labs with patient today. Assessment and Plan:     Diagnosis Orders      4. Herpes zoster without complication  valACYclovir (VALTREX) 1 g tablet      5. Bilateral hearing loss, unspecified hearing loss type  Christopher Fernando MD, Otolaryngology, [facility]      6. Primary hypertension  losartan (COZAAR) 50 MG tablet        Shingles rash -sent prescription for valacyclovir 1000 mg 3 times daily for a week. Advised patient to call if symptoms fail to improve or get worse or go to the emergency department. Bilateral hearing loss -last visit cardiology referral was given.   Patient was evaluated was found to have right-sided mixed hearing loss.  Audiology evaluation recommended ENT referral.  ENT referral provided during this visit. Primary HTN - BP controlled. Refilled toprol. Staffed with: Dr. Tabitha Boone. Lilia García.  [address]  Dept: [phone]  Dept Fax: [phone] : [phone]

## 2022-08-18 NOTE — PATIENT INSTRUCTIONS
- once your rash clears, please get these vaccines which you are due for at this time: prevnar 20, tdap, and shingles vaccine. Personalized Preventive Plan for Inge Jang - 8/18/2022  Medicare offers a range of preventive health benefits. Some of the tests and screenings are paid in full while other may be subject to a deductible, co-insurance, and/or copay. Some of these benefits include a comprehensive review of your medical history including lifestyle, illnesses that may run in your family, and various assessments and screenings as appropriate. After reviewing your medical record and screening and assessments performed today your provider may have ordered immunizations, labs, imaging, and/or referrals for you. A list of these orders (if applicable) as well as your Preventive Care list are included within your After Visit Summary for your review. Other Preventive Recommendations:    A preventive eye exam performed by an eye specialist is recommended every 1-2 years to screen for glaucoma; cataracts, macular degeneration, and other eye disorders. A preventive dental visit is recommended every 6 months. Try to get at least 150 minutes of exercise per week or 10,000 steps per day on a pedometer . Order or download the FREE \"Exercise & Physical Activity: Your Everyday Guide\" from The "[employer]. Call 2-267.549.2328 or search The "[employer] online. You need 0189-9072 mg of calcium and 6678-0054 IU of vitamin D per day. It is possible to meet your calcium requirement with diet alone, but a vitamin D supplement is usually necessary to meet this goal.  When exposed to the sun, use a sunscreen that protects against both UVA and UVB radiation with an SPF of 30 or greater. Reapply every 2 to 3 hours or after sweating, drying off with a towel, or swimming. Always wear a seat belt when traveling in a car. Always wear a helmet when riding a bicycle or motorcycle.

## 2022-09-19 ENCOUNTER — HOSPITAL ENCOUNTER (OUTPATIENT)
Dept: WOMENS IMAGING | Age: 67
Discharge: HOME | End: 2022-09-19
Payer: MEDICARE

## 2022-09-19 DIAGNOSIS — Z12.31 ENCOUNTER FOR SCREENING MAMMOGRAM FOR BREAST CANCER: ICD-10-CM

## 2022-09-19 DIAGNOSIS — Z78.0 ASYMPTOMATIC MENOPAUSAL STATE: ICD-10-CM

## 2022-09-19 PROCEDURE — 77063 BREAST TOMOSYNTHESIS BI: CPT

## 2022-09-19 PROCEDURE — 77080 DXA BONE DENSITY AXIAL: CPT

## 2022-12-13 ENCOUNTER — OFFICE VISIT (OUTPATIENT)
Dept: ENT CLINIC | Age: 67
End: 2022-12-13
Payer: MEDICARE

## 2022-12-13 VITALS
WEIGHT: 187.9 LBS | RESPIRATION RATE: 16 BRPM | DIASTOLIC BLOOD PRESSURE: 82 MMHG | OXYGEN SATURATION: 98 % | BODY MASS INDEX: 26.31 KG/M2 | TEMPERATURE: 97.7 F | HEART RATE: 69 BPM | SYSTOLIC BLOOD PRESSURE: 140 MMHG | HEIGHT: 71 IN

## 2022-12-13 DIAGNOSIS — J34.3 NASAL TURBINATE HYPERTROPHY: ICD-10-CM

## 2022-12-13 DIAGNOSIS — R44.8 FACIAL PRESSURE: ICD-10-CM

## 2022-12-13 DIAGNOSIS — H90.A31 MIXED CONDUCTIVE AND SENSORINEURAL HEARING LOSS OF RIGHT EAR WITH RESTRICTED HEARING OF LEFT EAR: ICD-10-CM

## 2022-12-13 DIAGNOSIS — H73.899 THICK TYMPANIC MEMBRANE: Primary | ICD-10-CM

## 2022-12-13 DIAGNOSIS — J30.9 ALLERGIC RHINITIS, UNSPECIFIED SEASONALITY, UNSPECIFIED TRIGGER: ICD-10-CM

## 2022-12-13 DIAGNOSIS — H90.A22 SENSORINEURAL HEARING LOSS (SNHL) OF LEFT EAR WITH RESTRICTED HEARING OF RIGHT EAR: ICD-10-CM

## 2022-12-13 DIAGNOSIS — J34.2 NASAL SEPTAL DEVIATION: ICD-10-CM

## 2022-12-13 PROCEDURE — G8484 FLU IMMUNIZE NO ADMIN: HCPCS | Performed by: PHYSICIAN ASSISTANT

## 2022-12-13 PROCEDURE — G8417 CALC BMI ABV UP PARAM F/U: HCPCS | Performed by: PHYSICIAN ASSISTANT

## 2022-12-13 PROCEDURE — 99204 OFFICE O/P NEW MOD 45 MIN: CPT | Performed by: PHYSICIAN ASSISTANT

## 2022-12-13 PROCEDURE — 1123F ACP DISCUSS/DSCN MKR DOCD: CPT | Performed by: PHYSICIAN ASSISTANT

## 2022-12-13 PROCEDURE — G8427 DOCREV CUR MEDS BY ELIG CLIN: HCPCS | Performed by: PHYSICIAN ASSISTANT

## 2022-12-13 PROCEDURE — G8399 PT W/DXA RESULTS DOCUMENT: HCPCS | Performed by: PHYSICIAN ASSISTANT

## 2022-12-13 PROCEDURE — 1090F PRES/ABSN URINE INCON ASSESS: CPT | Performed by: PHYSICIAN ASSISTANT

## 2022-12-13 PROCEDURE — 1036F TOBACCO NON-USER: CPT | Performed by: PHYSICIAN ASSISTANT

## 2022-12-13 PROCEDURE — 3017F COLORECTAL CA SCREEN DOC REV: CPT | Performed by: PHYSICIAN ASSISTANT
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
## 2022-12-28 DIAGNOSIS — I82.401 ACUTE DEEP VEIN THROMBOSIS (DVT) OF RIGHT LOWER EXTREMITY, UNSPECIFIED VEIN (HCC): ICD-10-CM

## 2022-12-28 NOTE — TELEPHONE ENCOUNTER
Called script to [facility]. , left prescription information on prescriber's voicemail .

## 2022-12-31 ENCOUNTER — APPOINTMENT (OUTPATIENT)
Dept: GENERAL RADIOLOGY | Age: 67
End: 2022-12-31
Payer: MEDICARE

## 2022-12-31 ENCOUNTER — HOSPITAL ENCOUNTER (EMERGENCY)
Age: 67
Discharge: HOME | End: 2022-12-31
Attending: EMERGENCY MEDICINE
Payer: MEDICARE

## 2022-12-31 VITALS
TEMPERATURE: 98.1 F | SYSTOLIC BLOOD PRESSURE: 182 MMHG | DIASTOLIC BLOOD PRESSURE: 79 MMHG | RESPIRATION RATE: 16 BRPM | HEART RATE: 80 BPM | OXYGEN SATURATION: 96 %

## 2022-12-31 DIAGNOSIS — H72.91 PERFORATION OF RIGHT TYMPANIC MEMBRANE: Primary | ICD-10-CM

## 2022-12-31 LAB
GROUP A STREP CULTURE, REFLEX: NEGATIVE
INFLUENZA A: NOT DETECTED
INFLUENZA B: NOT DETECTED
REFLEX THROAT C + S: NORMAL
SARS-COV-2 RNA, RT PCR: NOT DETECTED

## 2022-12-31 PROCEDURE — 87070 CULTURE OTHR SPECIMN AEROBIC: CPT

## 2022-12-31 PROCEDURE — 99284 EMERGENCY DEPT VISIT MOD MDM: CPT

## 2022-12-31 PROCEDURE — 71046 X-RAY EXAM CHEST 2 VIEWS: CPT

## 2022-12-31 PROCEDURE — 87880 STREP A ASSAY W/OPTIC: CPT

## 2022-12-31 PROCEDURE — 87636 SARSCOV2 & INF A&B AMP PRB: CPT

## 2022-12-31 RX ORDER — CEPHALEXIN 500 MG/1
500 CAPSULE ORAL 4 TIMES DAILY
Qty: 28 CAPSULE | Refills: 0 | Status: SHIPPED | OUTPATIENT
Start: 2022-12-31 | End: 2023-01-07

## 2022-12-31 NOTE — ED TRIAGE NOTES
Patient states she has been sick for one week. She has cough, congestin, right ear pain and sore throat. States she is seeing an ENT currently.

## 2022-12-31 NOTE — DISCHARGE INSTRUCTIONS
Continue Tylenol for pain control. Keep ears dry and do not let any liquids get into ears. Call ENT as soon as possible to schedule sooner follow-up due to perforation of right tympanic membrane.

## 2022-12-31 NOTE — ED PROVIDER NOTES
[address] [address]          Pt Name: Rayne Srivastava  MRN: [id number]  [place] 1955  Date of evaluation: 12/31/2022  Treating Resident Physician: Farhan Kaba DO  Supervising Physician: Marvel Salazar MD    History obtained from chart review and the patient. CHIEF COMPLAINT       Chief Complaint   Patient presents with    Pharyngitis    Otalgia     Right             HISTORY OF PRESENT ILLNESS    HPI  Rayne Srivastava is a 69 y.o. female who presents to the emergency department for evaluation of right-sided ear and throat pain. Patient states over the past week she has had a sore throat, also on the right side and nonproductive cough. Patient is that she is also had right-sided ear pain. Patient also reports discharge out of her right ear. Patient dates she has seen ENT approximately 1 and half weeks ago due to hearing loss out of her right ear. Patient dates that she is scheduled for facial CT on January 17. Patient is that she was prescribed allergy medications and is taking Mucinex DM. Patient states that her sore throat and ear pain has gotten worse since seeing the ENT. Patient denies any sinus pain or pressure. Patient denies any shortness of breath, chest pain. Patient dates that she has had some fatigue, however she attributes this to being on allergy medications which can cause drowsiness. Patient states she sometimes feels a pop in her ear. Per states that 2 nights ago she heard a snap in her ear. The patient has no other acute complaints at this time. REVIEW OF SYSTEMS   Review of Systems   Constitutional:  Positive for fatigue. Negative for chills and fever. HENT:  Positive for congestion, ear discharge, ear pain (Right-sided) and sore throat. Negative for rhinorrhea, sinus pressure and sinus pain. Respiratory:  Positive for cough (Nonproductive). Negative for shortness of breath and wheezing. Cardiovascular:  Negative for chest pain and palpitations. Gastrointestinal:  Negative for constipation, diarrhea, nausea and vomiting. Musculoskeletal:  Negative for arthralgias and myalgias. Neurological:  Negative for dizziness, syncope, light-headedness and headaches. PAST MEDICAL AND SURGICAL HISTORY     Past Medical History:   Diagnosis Date    Bilateral hearing loss     [facility] Leiden ([facility]     History of DVT (deep vein thrombosis)     multiple    History of pulmonary embolism     Stage 3a chronic kidney disease ([facility]      Past Surgical History:   Procedure Laterality Date    CT BIOPSY RENAL  4/6/2022    CT BIOPSY RENAL 4/6/2022 [facility] CT SCAN    SHOULDER SURGERY           MEDICATIONS   No current facility-administered medications for this encounter.     Current Outpatient Medications:     cephALEXin (KEFLEX) 500 MG capsule, Take 1 capsule by mouth 4 times daily for 7 days, Disp: 28 capsule, Rfl: 0    apixaban (ELIQUIS) 5 MG TABS tablet, Take 1 tablet by mouth 2 times daily, Disp: 180 tablet, Rfl: 0    losartan (COZAAR) 50 MG tablet, Take 1 tablet by mouth daily, Disp: 90 tablet, Rfl: 1    Cholecalciferol (VITAMIN D3) 125 MCG (5000 UT) TABS, Take 5,000 Units by mouth daily, Disp: , Rfl:     b complex vitamins capsule, Take 1 capsule by mouth daily, Disp: , Rfl:     Chromium-Cinnamon (CINNAMON PLUS CHROMIUM PO), Take 2 tablets by mouth daily, Disp: , Rfl:     ZINC PO, Take by mouth, Disp: , Rfl:     Ascorbic Acid (VITAMIN C) 500 MG CAPS, Take 500 mg by mouth daily, Disp: , Rfl:       SOCIAL HISTORY     Social History     Social History Narrative    Not on file     Social History     Tobacco Use    Smoking status: Never    Smokeless tobacco: Never   Substance Use Topics    Alcohol use: Yes     Comment: rarely    Drug use: Never         ALLERGIES     Allergies   Allergen Reactions    Other      Cotton wood    Penicillins          FAMILY HISTORY     Family History   Problem Relation Age of Onset Coronary Art Dis Father     GERD Father     Other Father 79        ESRD, hearing los    Other Sister 39        ESRD    GERD Sister     Hearing Loss Sister     Cancer Brother     Hearing Loss Brother     Hearing Loss Brother          PREVIOUS RECORDS   Previous records reviewed:  Patient is previously been seen in this ED for acute gout due to other secondary cause involving toe of left foot on 5/200/22, and acute DVT of right lower extremity on 9/28/2021 . PHYSICAL EXAM     ED Triage Vitals [12/31/22 1139]   BP Temp Temp Source Heart Rate Resp SpO2 Height Weight   (!) 182/79 98.1 °F (36.7 °C) Oral 80 16 96 % -- --     Initial vital signs and nursing assessment reviewed and abnormal from elevated blood pressure . There is no height or weight on file to calculate BMI. Pulsoximetry is normal per my interpretation. Additional Vital Signs:  Vitals:    12/31/22 1139   BP: (!) 182/79   Pulse: 80   Resp: 16   Temp: 98.1 °F (36.7 °C)   SpO2: 96%       Physical Exam  Constitutional:       Appearance: Normal appearance. HENT:      Head: Normocephalic and atraumatic. Right Ear: Ear canal and external ear normal. Decreased hearing noted. Drainage present. Tympanic membrane is perforated. Tympanic membrane is not erythematous. Left Ear: Tympanic membrane, ear canal and external ear normal.      Mouth/Throat:      Mouth: Mucous membranes are moist.      Pharynx: Oropharynx is clear. No posterior oropharyngeal erythema. Cardiovascular:      Rate and Rhythm: Normal rate and regular rhythm. Pulses: Normal pulses. Heart sounds: Normal heart sounds. No murmur heard. No friction rub. No gallop. Pulmonary:      Effort: Pulmonary effort is normal.      Breath sounds: Normal breath sounds. No wheezing, rhonchi or rales. Abdominal:      General: Abdomen is flat. Palpations: Abdomen is soft. Tenderness: There is no abdominal tenderness. There is no guarding.    Musculoskeletal:      Right lower leg: No edema. Left lower leg: No edema. Skin:     General: Skin is warm and dry. Neurological:      General: No focal deficit present. Mental Status: She is alert and oriented to person, place, and time. Psychiatric:         Mood and Affect: Mood normal.         Behavior: Behavior normal.           MEDICAL DECISION MAKING   Initial Assessment: Patient 69-year-old female presents complaint of right-sided sore throat, cough, congestion, and right-sided otalgia with ear drainage. Differential diagnosis includes COVID-19 infection, influenza infection, strep infection acute otitis. Pneumonia is unlikely, however proper imaging is needed. Plan:   COVID-19 influenza combo  Group-A strep with reflex culture  Chest x-ray, 2 view  Otoscope examination        ED RESULTS   Laboratory results:  Labs Reviewed   COVID-19 & INFLUENZA COMBO   CULTURE, THROAT    Narrative:     Source: throat       Site:           Current Antibiotics: not stated   GROUP A STREP, REFLEX       Radiologic studies results:  XR CHEST (2 VW)   Final Result   No acute abnormality identified. **This report has been created using voice recognition software. It may contain minor errors which are inherent in voice recognition technology. **      Final report electronically signed by Dr. Gregg Cho MD on 12/31/2022 12:41 PM          ED Medications administered this visit: Medications - No data to display      ED COURSE      Patient was notified of negative test results. Patient was notified of perforated tympanic membrane with no clear signs of infection. Patient was told to follow-up soon with ENT for further management and work-up. Patient was encouraged to continue taking Mucinex DM and Tylenol for pain control. Patient was agreed to keep ears dry do not let any liquids into ears. Patient was prescribed prophylactic antibiotics to prevent any infection due to perforated tympanic membrane.     Strict return precautions and follow up instructions were discussed with the patient prior to discharge, with which the patient agrees. MEDICATION CHANGES     Discharge Medication List as of 12/31/2022  1:25 PM        START taking these medications    Details   cephALEXin (KEFLEX) 500 MG capsule Take 1 capsule by mouth 4 times daily for 7 days, Disp-28 capsule, R-0Normal               FINAL DISPOSITION     Final diagnoses:   Perforation of right tympanic membrane     Condition: condition: stable  Dispo: Discharge to home      This transcription was electronically signed. Parts of this transcriptions may have been dictated by use of voice recognition software and electronically transcribed, and parts may have been transcribed with the assistance of an ED scribe. The transcription may contain errors not detected in proofreading. Please refer to my supervising physician's documentation if my documentation differs.     Electronically Signed: Angel Farah DO, 12/31/22, 8:25 PM        Angel Farah DO  Resident  12/31/22 2025

## 2023-01-03 ENCOUNTER — OFFICE VISIT (OUTPATIENT)
Dept: ENT CLINIC | Age: 68
End: 2023-01-03
Payer: MEDICARE

## 2023-01-03 VITALS
RESPIRATION RATE: 20 BRPM | TEMPERATURE: 97.2 F | BODY MASS INDEX: 25.87 KG/M2 | OXYGEN SATURATION: 97 % | WEIGHT: 184.8 LBS | HEART RATE: 72 BPM | HEIGHT: 71 IN | SYSTOLIC BLOOD PRESSURE: 132 MMHG | DIASTOLIC BLOOD PRESSURE: 72 MMHG

## 2023-01-03 DIAGNOSIS — H92.11 PURULENT OTORRHEA, RIGHT: ICD-10-CM

## 2023-01-03 DIAGNOSIS — H72.91 PERFORATION OF RIGHT TYMPANIC MEMBRANE: Primary | ICD-10-CM

## 2023-01-03 LAB — THROAT/NOSE CULTURE: NORMAL

## 2023-01-03 PROCEDURE — 1123F ACP DISCUSS/DSCN MKR DOCD: CPT | Performed by: OTOLARYNGOLOGY

## 2023-01-03 PROCEDURE — 3017F COLORECTAL CA SCREEN DOC REV: CPT | Performed by: OTOLARYNGOLOGY

## 2023-01-03 PROCEDURE — G8399 PT W/DXA RESULTS DOCUMENT: HCPCS | Performed by: OTOLARYNGOLOGY

## 2023-01-03 PROCEDURE — G8427 DOCREV CUR MEDS BY ELIG CLIN: HCPCS | Performed by: OTOLARYNGOLOGY

## 2023-01-03 PROCEDURE — 1036F TOBACCO NON-USER: CPT | Performed by: OTOLARYNGOLOGY

## 2023-01-03 PROCEDURE — G8417 CALC BMI ABV UP PARAM F/U: HCPCS | Performed by: OTOLARYNGOLOGY

## 2023-01-03 PROCEDURE — 92504 EAR MICROSCOPY EXAMINATION: CPT | Performed by: OTOLARYNGOLOGY

## 2023-01-03 PROCEDURE — G8484 FLU IMMUNIZE NO ADMIN: HCPCS | Performed by: OTOLARYNGOLOGY

## 2023-01-03 PROCEDURE — 99204 OFFICE O/P NEW MOD 45 MIN: CPT | Performed by: OTOLARYNGOLOGY

## 2023-01-03 PROCEDURE — 1090F PRES/ABSN URINE INCON ASSESS: CPT | Performed by: OTOLARYNGOLOGY

## 2023-01-03 RX ORDER — CIPROFLOXACIN HYDROCHLORIDE 3.5 MG/ML
4 SOLUTION/ DROPS TOPICAL 3 TIMES DAILY
Qty: 5 ML | Refills: 1 | Status: SHIPPED | OUTPATIENT
Start: 2023-01-03 | End: 2023-01-17

## 2023-01-03 NOTE — PROGRESS NOTES
[address] EAR, NOSE AND THROAT  [address] [place] [address]  [address] [facility],[address]  Dept: [phone]  Dept Fax: [phone]  Loc: [phone]    Ismael Yo is a 69 y.o. female who was referred byNo ref. provider found for:  Chief Complaint   Patient presents with    Follow-up     Patient is here for a f/u for TM perforation. She was seen in the ER on 12/31/22. Patient said she spoke to you yesterday and you wanted to see her for a culture today. Shameka Randolph HPI:     Ismael Yo is a 69 y.o. female who presents today for evaluation of right ear pain and drainage. Shameka Randolph History: Allergies   Allergen Reactions    Other      Cotton wood    Penicillins      Current Outpatient Medications   Medication Sig Dispense Refill    ciprofloxacin (CILOXAN) 0.3 % ophthalmic solution Place 4 drops in ear(s) three times daily for 14 days Right ear, keep instilled ear up for 10 min. Warm bottle in pocket for 30 minutes so it is body temperature. Allow excess to drain out on tissue. Then let air dry. 5 mL 1    apixaban (ELIQUIS) 5 MG TABS tablet Take 1 tablet by mouth 2 times daily 180 tablet 0    losartan (COZAAR) 50 MG tablet Take 1 tablet by mouth daily 90 tablet 1    Cholecalciferol (VITAMIN D3) 125 MCG (5000 UT) TABS Take 5,000 Units by mouth daily      b complex vitamins capsule Take 1 capsule by mouth daily      Chromium-Cinnamon (CINNAMON PLUS CHROMIUM PO) Take 2 tablets by mouth daily      ZINC PO Take by mouth      Ascorbic Acid (VITAMIN C) 500 MG CAPS Take 500 mg by mouth daily       No current facility-administered medications for this visit.      Past Medical History:   Diagnosis Date    Bilateral hearing loss     Factor V Leiden ([facility]     History of DVT (deep vein thrombosis)     multiple    History of pulmonary embolism     Stage 3a chronic kidney disease (HCC)       Past Surgical History:   Procedure Laterality Date    CT BIOPSY RENAL  4/6/2022    CT BIOPSY RENAL 4/6/2022 [facility] CT SCAN    SHOULDER SURGERY       Family History   Problem Relation Age of Onset    Coronary Art Dis Father     GERD Father     Other Father 79        ESRD, hearing los    Other Sister 39        ESRD    GERD Sister     Hearing Loss Sister     Cancer Brother     Hearing Loss Brother     Hearing Loss Brother      Social History     Tobacco Use    Smoking status: Never    Smokeless tobacco: Never   Substance Use Topics    Alcohol use: Yes     Comment: rarely       Subjective:      Review of Systems    Objective:   /72   Pulse 72   Temp 97.2 °F (36.2 °C) (Infrared)   Resp 20   Ht 5' 10.5\" (1.791 m)   Wt 184 lb 12.8 oz (83.8 kg)   SpO2 97%   BMI 26.14 kg/m²     Physical Exam  Right ear: Purulent drainage in the canal, could not see the tympanic membrane. Binocular microscopy:  Right ear is examined under the operating microscope. There is lateral drainage was suctioned. Culture was taken medially by the tympanic membrane. Perforation was identified. All the drainage that could be evacuated with the suction was removed. Boric acid powder was insufflated. Data:  All of the past medical history, past surgical history, family history,social history, allergies and current medications were reviewed with the patient. Assessment & Plan   Diagnoses and all orders for this visit:     Diagnosis Orders   1. Perforation of right tympanic membrane  ciprofloxacin (CILOXAN) 0.3 % ophthalmic solution    Culture, Ear    DE BINOCULAR MICROSCOPY SEPARATE DX PROCEDURE    Small, anterior quadrant      2. Purulent otorrhea, right  ciprofloxacin (CILOXAN) 0.3 % ophthalmic solution    Culture, Ear    DE BINOCULAR MICROSCOPY SEPARATE DX PROCEDURE          The findings were explained and her questions were answered. Patient started on Ciloxan ophthalmic drops in the right ear. Antibiotic concentration is very high and overcomes bacteria that test \"resistant\".   She should continue the Keflex until we have the culture report at least.  Water precautions. Greta Colon. Roman Queen MD    **This report has been created using voice recognition software. It may contain minor errors which are inherent in voicerecognition technology. **

## 2023-01-05 LAB
AEROBIC CULTURE: ABNORMAL
GRAM STAIN RESULT: ABNORMAL
ORGANISM: ABNORMAL

## 2023-01-17 ENCOUNTER — OFFICE VISIT (OUTPATIENT)
Dept: ENT CLINIC | Age: 68
End: 2023-01-17
Payer: MEDICARE

## 2023-01-17 ENCOUNTER — HOSPITAL ENCOUNTER (OUTPATIENT)
Dept: CT IMAGING | Age: 68
Discharge: HOME | End: 2023-01-17
Payer: MEDICARE

## 2023-01-17 VITALS
OXYGEN SATURATION: 97 % | RESPIRATION RATE: 20 BRPM | DIASTOLIC BLOOD PRESSURE: 72 MMHG | SYSTOLIC BLOOD PRESSURE: 124 MMHG | TEMPERATURE: 97.7 F | WEIGHT: 186.1 LBS | HEIGHT: 70 IN | HEART RATE: 81 BPM | BODY MASS INDEX: 26.64 KG/M2

## 2023-01-17 DIAGNOSIS — H92.11 PURULENT OTORRHEA, RIGHT: ICD-10-CM

## 2023-01-17 DIAGNOSIS — J34.3 NASAL TURBINATE HYPERTROPHY: ICD-10-CM

## 2023-01-17 DIAGNOSIS — J34.2 NASAL SEPTAL DEVIATION: ICD-10-CM

## 2023-01-17 DIAGNOSIS — A49.01 MSSA (METHICILLIN SUSCEPTIBLE STAPHYLOCOCCUS AUREUS) INFECTION: ICD-10-CM

## 2023-01-17 DIAGNOSIS — H72.91 PERFORATION OF RIGHT TYMPANIC MEMBRANE: Primary | ICD-10-CM

## 2023-01-17 DIAGNOSIS — R44.8 FACIAL PRESSURE: ICD-10-CM

## 2023-01-17 PROCEDURE — G8427 DOCREV CUR MEDS BY ELIG CLIN: HCPCS | Performed by: OTOLARYNGOLOGY

## 2023-01-17 PROCEDURE — G8399 PT W/DXA RESULTS DOCUMENT: HCPCS | Performed by: OTOLARYNGOLOGY

## 2023-01-17 PROCEDURE — 1036F TOBACCO NON-USER: CPT | Performed by: OTOLARYNGOLOGY

## 2023-01-17 PROCEDURE — 3017F COLORECTAL CA SCREEN DOC REV: CPT | Performed by: OTOLARYNGOLOGY

## 2023-01-17 PROCEDURE — G8484 FLU IMMUNIZE NO ADMIN: HCPCS | Performed by: OTOLARYNGOLOGY

## 2023-01-17 PROCEDURE — 1090F PRES/ABSN URINE INCON ASSESS: CPT | Performed by: OTOLARYNGOLOGY

## 2023-01-17 PROCEDURE — 70486 CT MAXILLOFACIAL W/O DYE: CPT

## 2023-01-17 PROCEDURE — G8417 CALC BMI ABV UP PARAM F/U: HCPCS | Performed by: OTOLARYNGOLOGY

## 2023-01-17 PROCEDURE — 1123F ACP DISCUSS/DSCN MKR DOCD: CPT | Performed by: OTOLARYNGOLOGY

## 2023-01-17 PROCEDURE — 99214 OFFICE O/P EST MOD 30 MIN: CPT | Performed by: OTOLARYNGOLOGY

## 2023-01-17 NOTE — PROGRESS NOTES
[address] EAR, NOSE AND THROAT  [address] [place] [address]  [address] [facility],4Th Floor  Dept: [phone]  Dept Fax: [phone]  Loc: [phone]    Davion Turner is a 69 y.o. female who was referred byNo ref. provider found for:  Chief Complaint   Patient presents with    Follow-up     Patient is here for f/u after CT scan on Facial Bone done today. Judie Vyas HPI:     Davion Turner is a 69 y.o. female who presents today for follow-up of the CT of her facial bones which includes her temporal bones. Her right ear continues to drain. Review of the CT shows there is no significant sinusitis and adenoid pad is not enlarged. Septum is deviated to the left posterior spur appears to be impacting turbinate at times. Mastoid has some mucosal thickening middle ears are well aerated    History: Allergies   Allergen Reactions    Other      Cotton wood    Penicillins      Current Outpatient Medications   Medication Sig Dispense Refill    ciprofloxacin (CILOXAN) 0.3 % ophthalmic solution Place 4 drops in ear(s) three times daily Right ear, keep instilled ear up for 10 min. Warm bottle in pocket to body temperature for 30 minutes before using. Stay on the drops until return visit. 5 mL 2    apixaban (ELIQUIS) 5 MG TABS tablet Take 1 tablet by mouth 2 times daily 180 tablet 0    losartan (COZAAR) 50 MG tablet Take 1 tablet by mouth daily 90 tablet 1    Cholecalciferol (VITAMIN D3) 125 MCG (5000 UT) TABS Take 5,000 Units by mouth daily      b complex vitamins capsule Take 1 capsule by mouth daily      Chromium-Cinnamon (CINNAMON PLUS CHROMIUM PO) Take 2 tablets by mouth daily      ZINC PO Take by mouth      Ascorbic Acid (VITAMIN C) 500 MG CAPS Take 500 mg by mouth daily       No current facility-administered medications for this visit.      Past Medical History:   Diagnosis Date    Bilateral hearing loss     Factor V Leiden ([facility] [address]     History of DVT (deep vein thrombosis) multiple    History of pulmonary embolism     Stage 3a chronic kidney disease (HCC)       Past Surgical History:   Procedure Laterality Date    CT BIOPSY RENAL  4/6/2022    CT BIOPSY RENAL 4/6/2022 [facility] CT SCAN    SHOULDER SURGERY       Family History   Problem Relation Age of Onset    Coronary Art Dis Father     GERD Father     Other Father 79        ESRD, hearing los    Other Sister 39        ESRD    GERD Sister     Hearing Loss Sister     Cancer Brother     Hearing Loss Brother     Hearing Loss Brother      Social History     Tobacco Use    Smoking status: Never    Smokeless tobacco: Never   Substance Use Topics    Alcohol use: Yes     Comment: rarely       Subjective:      Review of Systems    Objective:   /72   Pulse 81   Temp 97.7 °F (36.5 °C) (Infrared)   Resp 20   Ht 5' 10\" (1.778 m)   Wt 186 lb 1.6 oz (84.4 kg)   SpO2 97%   BMI 26.70 kg/m²     Physical Exam  Binocular microscope exam:  Right ear was examined under the microscope and suction. Meticulous cleaning was performed. Perforation was noted. Boric acid powder was insufflated      Data:  All of the past medical history, past surgical history, family history,social history, allergies and current medications were reviewed with the patient. Assessment & Plan   Diagnoses and all orders for this visit:     Diagnosis Orders   1. Perforation of right tympanic membrane  ciprofloxacin (CILOXAN) 0.3 % ophthalmic solution    cephALEXin (KEFLEX) 500 MG capsule      2. Purulent otorrhea, right  ciprofloxacin (CILOXAN) 0.3 % ophthalmic solution    cephALEXin (KEFLEX) 500 MG capsule      3. MSSA (methicillin susceptible Staphylococcus aureus) infection  ciprofloxacin (CILOXAN) 0.3 % ophthalmic solution    cephALEXin (KEFLEX) 500 MG capsule          The findings were explained and her questions were answered. As cultures showed MSSA. Keflex is added to a refill on the Ciloxan.      She is to stay on the drops until her return visit in a alfa Light. Johanna Banks MD    **This report has been created using voice recognition software. It may contain minor errors which are inherent in voicerecognition technology. **

## 2023-01-19 ENCOUNTER — TELEPHONE (OUTPATIENT)
Dept: ENT CLINIC | Age: 68
End: 2023-01-19

## 2023-01-19 NOTE — TELEPHONE ENCOUNTER
Patient left message on clinical voicemail asking about Rx for ear drops and an antibiotic. Patient said the computers had went down so Dr Arvind Rogel was not able to send the Rx. She also said she needs to schedule a 1 month follow up. Encounter printed out for Dr Arvind Rogel.

## 2023-01-20 RX ORDER — CIPROFLOXACIN HYDROCHLORIDE 3.5 MG/ML
4 SOLUTION/ DROPS TOPICAL 3 TIMES DAILY
Qty: 5 ML | Refills: 2 | Status: SHIPPED | OUTPATIENT
Start: 2023-01-20

## 2023-01-20 RX ORDER — CEPHALEXIN 500 MG/1
500 CAPSULE ORAL 4 TIMES DAILY
Qty: 56 CAPSULE | Refills: 0 | Status: SHIPPED | OUTPATIENT
Start: 2023-01-20 | End: 2023-02-03

## 2023-01-20 NOTE — TELEPHONE ENCOUNTER
Dr Montrell Bhagat sent Rx in for the ear drops and antibiotic. Attempted to call patient. Got voicemail, left detailed message regarding the Rx sent and asked patient to call the office to be scheduled for a 1 month follow up with Dr Montrell Bhagat.

## 2023-02-21 ENCOUNTER — OFFICE VISIT (OUTPATIENT)
Dept: ENT CLINIC | Age: 68
End: 2023-02-21
Payer: MEDICARE

## 2023-02-21 VITALS
DIASTOLIC BLOOD PRESSURE: 76 MMHG | BODY MASS INDEX: 25.62 KG/M2 | RESPIRATION RATE: 16 BRPM | WEIGHT: 183 LBS | SYSTOLIC BLOOD PRESSURE: 124 MMHG | HEIGHT: 71 IN | TEMPERATURE: 97.2 F | HEART RATE: 92 BPM | OXYGEN SATURATION: 98 %

## 2023-02-21 DIAGNOSIS — H90.A22 SENSORINEURAL HEARING LOSS (SNHL) OF LEFT EAR WITH RESTRICTED HEARING OF RIGHT EAR: ICD-10-CM

## 2023-02-21 DIAGNOSIS — Z86.711 HISTORY OF PULMONARY EMBOLISM: ICD-10-CM

## 2023-02-21 DIAGNOSIS — H90.A31 MIXED CONDUCTIVE AND SENSORINEURAL HEARING LOSS OF RIGHT EAR WITH RESTRICTED HEARING OF LEFT EAR: ICD-10-CM

## 2023-02-21 DIAGNOSIS — Z86.718 HISTORY OF DVT (DEEP VEIN THROMBOSIS): ICD-10-CM

## 2023-02-21 DIAGNOSIS — N18.31 STAGE 3A CHRONIC KIDNEY DISEASE (HCC): ICD-10-CM

## 2023-02-21 DIAGNOSIS — A49.01 MSSA (METHICILLIN SUSCEPTIBLE STAPHYLOCOCCUS AUREUS) INFECTION: ICD-10-CM

## 2023-02-21 DIAGNOSIS — D68.51 FACTOR V LEIDEN (HCC): ICD-10-CM

## 2023-02-21 DIAGNOSIS — H72.91 PERFORATION OF RIGHT TYMPANIC MEMBRANE: Primary | ICD-10-CM

## 2023-02-21 DIAGNOSIS — Z79.01 CURRENT USE OF LONG TERM ANTICOAGULATION: ICD-10-CM

## 2023-02-21 PROCEDURE — 1123F ACP DISCUSS/DSCN MKR DOCD: CPT | Performed by: OTOLARYNGOLOGY

## 2023-02-21 PROCEDURE — G8484 FLU IMMUNIZE NO ADMIN: HCPCS | Performed by: OTOLARYNGOLOGY

## 2023-02-21 PROCEDURE — 99213 OFFICE O/P EST LOW 20 MIN: CPT | Performed by: OTOLARYNGOLOGY

## 2023-02-21 PROCEDURE — G8399 PT W/DXA RESULTS DOCUMENT: HCPCS | Performed by: OTOLARYNGOLOGY

## 2023-02-21 PROCEDURE — 1036F TOBACCO NON-USER: CPT | Performed by: OTOLARYNGOLOGY

## 2023-02-21 PROCEDURE — G8417 CALC BMI ABV UP PARAM F/U: HCPCS | Performed by: OTOLARYNGOLOGY

## 2023-02-21 PROCEDURE — 1090F PRES/ABSN URINE INCON ASSESS: CPT | Performed by: OTOLARYNGOLOGY

## 2023-02-21 PROCEDURE — 3017F COLORECTAL CA SCREEN DOC REV: CPT | Performed by: OTOLARYNGOLOGY

## 2023-02-21 PROCEDURE — G8427 DOCREV CUR MEDS BY ELIG CLIN: HCPCS | Performed by: OTOLARYNGOLOGY

## 2023-02-21 RX ORDER — AMPICILLIN TRIHYDRATE 250 MG
CAPSULE ORAL
COMMUNITY

## 2023-02-21 ASSESSMENT — ENCOUNTER SYMPTOMS
CHOKING: 0
RHINORRHEA: 0
SORE THROAT: 0
COUGH: 0
STRIDOR: 0
WHEEZING: 0
APNEA: 0
COLOR CHANGE: 0
VOICE CHANGE: 0
VOMITING: 0
TROUBLE SWALLOWING: 0
SINUS PRESSURE: 0
NAUSEA: 0
FACIAL SWELLING: 0
ABDOMINAL PAIN: 0
SHORTNESS OF BREATH: 0
CHEST TIGHTNESS: 0
DIARRHEA: 0

## 2023-02-21 NOTE — PROGRESS NOTES
[facility] [place] [facility]  [facility] EAR, NOSE AND THROAT  [address]  [address]  [facility] [address]  Dept: [phone]  Dept Fax: [phone]  Loc: [phone]    Carolynn Wynn is a 67 y.o. female who was referred byNo ref. provider found for:  Chief Complaint   Patient presents with    Follow-up     Patient is here for 1 mo f/u perforation of right tympanic membrane   .    HPI:     Carolynn Wynn is a 67 y.o. female who presents today for follow-up on the purulent drainage from her right ear that has a perforation.  This has subsided on the oral and topical antibiotic therapy.  It grew MSSA on culture..    History:     Allergies   Allergen Reactions    Other      Cotton wood    Penicillins      Current Outpatient Medications   Medication Sig Dispense Refill    Coenzyme Q10 (COQ10) 200 MG CAPS Take by mouth      ciprofloxacin (CILOXAN) 0.3 % ophthalmic solution Place 4 drops in ear(s) three times daily Right ear, keep instilled ear up for 10 min.  Warm bottle in pocket to body temperature for 30 minutes before using.  Stay on the drops until return visit. 5 mL 2    apixaban (ELIQUIS) 5 MG TABS tablet Take 1 tablet by mouth 2 times daily 180 tablet 0    losartan (COZAAR) 50 MG tablet Take 1 tablet by mouth daily 90 tablet 1    Cholecalciferol (VITAMIN D3) 125 MCG (5000 UT) TABS Take 5,000 Units by mouth daily      b complex vitamins capsule Take 1 capsule by mouth daily      Chromium-Cinnamon (CINNAMON PLUS CHROMIUM PO) Take 2 tablets by mouth daily      ZINC PO Take by mouth      Ascorbic Acid (VITAMIN C) 500 MG CAPS Take 500 mg by mouth daily       No current facility-administered medications for this visit.     Past Medical History:   Diagnosis Date    Bilateral hearing loss     COPD (chronic obstructive pulmonary disease) ([facility])     Factor V Leiden ([facility])     History of DVT (deep vein thrombosis)     multiple    History of pulmonary embolism     Stage 3a chronic kidney disease  [facility])       Past Surgical History:   Procedure Laterality Date    CT BIOPSY RENAL  04/06/2022    CT BIOPSY RENAL 4/6/2022 [facility] CT SCAN    SHOULDER SURGERY Bilateral     rotator cuff     Family History   Problem Relation Age of Onset    Deep Vein Thrombosis Mother     Coronary Art Dis Father     GERD Father     Other Father 79        ESRD, hearing los    Other Sister 39        ESRD    GERD Sister     Pulmonary Embolism Sister     Hearing Loss Sister     Cancer Brother         lung, kidney    Hearing Loss Brother     Hearing Loss Brother      Social History     Tobacco Use    Smoking status: Never    Smokeless tobacco: Never   Substance Use Topics    Alcohol use: Yes     Comment: rarely       Subjective:      Review of Systems   Constitutional:  Negative for activity change, appetite change, chills, diaphoresis, fatigue, fever and unexpected weight change. HENT:  Positive for congestion. Negative for dental problem, ear discharge, ear pain, facial swelling, hearing loss, mouth sores, nosebleeds, postnasal drip, rhinorrhea, sinus pressure, sneezing, sore throat, tinnitus, trouble swallowing and voice change. Eyes:  Negative for visual disturbance. Respiratory:  Negative for apnea, cough, choking, chest tightness, shortness of breath, wheezing and stridor. Cardiovascular:  Negative for chest pain, palpitations and leg swelling. Gastrointestinal:  Negative for abdominal pain, diarrhea, nausea and vomiting. Endocrine: Negative for cold intolerance, heat intolerance, polydipsia and polyuria. Genitourinary:  Negative for dysuria, enuresis and hematuria. Musculoskeletal:  Negative for arthralgias, gait problem, neck pain and neck stiffness. Skin:  Negative for color change and rash. Allergic/Immunologic: Negative for environmental allergies, food allergies and immunocompromised state. Neurological:  Negative for dizziness, syncope, facial asymmetry, speech difficulty, light-headedness and headaches. Hematological:  Negative for adenopathy. Does not bruise/bleed easily. Psychiatric/Behavioral:  Negative for confusion and sleep disturbance. The patient is not nervous/anxious. Objective:   /76 (Site: Left Upper Arm, Position: Sitting)   Pulse 92   Temp 97.2 °F (36.2 °C) (Infrared)   Resp 16   Ht 5' 10.5\" (1.791 m)   Wt 183 lb (83 kg)   SpO2 98%   BMI 25.89 kg/m²     Physical Exam  Right ear: Perforation remains  There is no drainage or inflammation. Data:  All of the past medical history, past surgical history, family history,social history, allergies and current medications were reviewed with the patient. Assessment & Plan   Diagnoses and all orders for this visit:     Diagnosis Orders   1. Perforation of right tympanic membrane  Giovana Guidry MD, Hematology & Oncology, [address] [place] [address]    Comprehensive hearing test      2. MSSA (methicillin susceptible Staphylococcus aureus) infection        3. Mixed conductive and sensorineural hearing loss of right ear with restricted hearing of left ear  Comprehensive hearing test      4. Sensorineural hearing loss (SNHL) of left ear with restricted hearing of right ear  Comprehensive hearing test      5. Factor V Leiden ([facility] [address]  Giovana Guidry MD, Hematology & Oncology, [address] [place] [address]      6. History of pulmonary embolism  Lilia Leyva MD, Hematology & Oncology, [address] [place] [address]      7. History of DVT (deep vein thrombosis)  Giovana Guidry MD, Hematology & Oncology, [address] [place] [address]      8. Stage 3a chronic kidney disease ([facility] [address]        9. Current use of long term anticoagulation  Giovana Guidry MD, Hematology & Oncology, [address] [place] [address]          The findings were explained and her questions were answered. If there is no recurrence of infection for a couple of months we could proceed with tympanoplasty. Patient is interested but has Leiden V, renal failure and COPD.       This patient will be evaluated in a couple of months for her ear infection and get a hearing test.  If she remains clear she needs a tympanic membrane repair. This takes about 2 hours of general anesthesia. Coagulation needs to be near normal.  Consult with hematology to help work out a plan to get her off the Eliquis safely for the procedure and a day or so afterwards. Low-dose aspirin would be acceptable if effective for the Leiden V.  .    She will continue the Cipro drops in the right ear twice a week    She would like to proceed with that plan      Return for Audiol review. Orval Born. Sony Sloan MD    **This report has been created using voice recognition software. It may contain minor errors which are inherent in voicerecognition technology. **

## 2023-02-27 ENCOUNTER — OFFICE VISIT (OUTPATIENT)
Dept: ONCOLOGY | Age: 68
End: 2023-02-27
Payer: MEDICARE

## 2023-02-27 ENCOUNTER — HOSPITAL ENCOUNTER (OUTPATIENT)
Dept: INFUSION THERAPY | Age: 68
Discharge: HOME | End: 2023-02-27
Payer: MEDICARE

## 2023-02-27 VITALS
RESPIRATION RATE: 18 BRPM | SYSTOLIC BLOOD PRESSURE: 144 MMHG | DIASTOLIC BLOOD PRESSURE: 86 MMHG | TEMPERATURE: 98.5 F | HEART RATE: 87 BPM

## 2023-02-27 VITALS
OXYGEN SATURATION: 95 % | RESPIRATION RATE: 18 BRPM | HEIGHT: 71 IN | BODY MASS INDEX: 26.04 KG/M2 | DIASTOLIC BLOOD PRESSURE: 86 MMHG | TEMPERATURE: 98.5 F | HEART RATE: 87 BPM | WEIGHT: 186 LBS | SYSTOLIC BLOOD PRESSURE: 144 MMHG

## 2023-02-27 DIAGNOSIS — Z86.2 HISTORY OF FACTOR V LEIDEN MUTATION: Primary | ICD-10-CM

## 2023-02-27 DIAGNOSIS — Z86.718 HISTORY OF VENOUS THROMBOEMBOLISM: ICD-10-CM

## 2023-02-27 PROCEDURE — 1036F TOBACCO NON-USER: CPT | Performed by: PHYSICIAN ASSISTANT

## 2023-02-27 PROCEDURE — 99211 OFF/OP EST MAY X REQ PHY/QHP: CPT

## 2023-02-27 PROCEDURE — 3017F COLORECTAL CA SCREEN DOC REV: CPT | Performed by: PHYSICIAN ASSISTANT

## 2023-02-27 PROCEDURE — G8417 CALC BMI ABV UP PARAM F/U: HCPCS | Performed by: PHYSICIAN ASSISTANT

## 2023-02-27 PROCEDURE — G8427 DOCREV CUR MEDS BY ELIG CLIN: HCPCS | Performed by: PHYSICIAN ASSISTANT

## 2023-02-27 PROCEDURE — G8484 FLU IMMUNIZE NO ADMIN: HCPCS | Performed by: PHYSICIAN ASSISTANT

## 2023-02-27 PROCEDURE — G8399 PT W/DXA RESULTS DOCUMENT: HCPCS | Performed by: PHYSICIAN ASSISTANT

## 2023-02-27 PROCEDURE — 99204 OFFICE O/P NEW MOD 45 MIN: CPT | Performed by: PHYSICIAN ASSISTANT

## 2023-02-27 PROCEDURE — 1090F PRES/ABSN URINE INCON ASSESS: CPT | Performed by: PHYSICIAN ASSISTANT

## 2023-02-27 PROCEDURE — 1123F ACP DISCUSS/DSCN MKR DOCD: CPT | Performed by: PHYSICIAN ASSISTANT

## 2023-02-27 NOTE — PROGRESS NOTES
[facility] [facility],[address]  [place] [address]  Dept: [phone]  Dept Fax: [phone]: [phone]      Visit Date:2/27/2023     Julia Johnson is a 69 y.o. female who presents today for:   Chief Complaint   Patient presents with    New Patient      Factor V Leiden (HCC)/Hx of PE & DVT        HPI:   Julia Johnson is a 69 y.o. female referred to Hematology/Oncology clinic for evaluation of Factor V Leiden, history of PE/DVT and perforation of right TM per ENT, Dr. Darek Hooks. The patient has been following with ENT for right ear pain and drainage. She has been recommended potential tympanoplasty. She has follow up with Dr. Darek Hooks on 3/24/23 and decision for surgery at that time. The patient reports she was diagnosed with PE over 15 years ago. She reports she treated initially with Heparin and was on Coumadin for several years. She affirms history of DVT. She states her first DVT occurred approximately 11 years ago following rotator cuff repair. She had 2nd DVT in 2021. Per EMR was diagnosed 9/28/2021 on venous doppler of right lower extremity showing acute DVT of right mid and distal superficial femoral, popliteal, posterior tibial, peroneal and anterior tibial veins. She has been on Eliquis since that time. She states she was diagnosed with Factor V leiden mutation after diagnosis of PE. She is unsure if homozygous or heterozygous. She affirms history of miscarriage. She tolerates Eliquis well. Denies any abnormal bleeding. PMH includes Factor V Leiden, history of DVT/PE, CKD, HTN, COPD. Pertinent family history includes positive Factor V Leiden in her brother, niece and sister, likely mother was diagnosed.          Past Medical History:   Diagnosis Date    Bilateral hearing loss     COPD (chronic obstructive pulmonary disease) (HCC)     Factor V Leiden (HCC)     History of DVT (deep vein thrombosis)     multiple    History of pulmonary embolism     Stage 3a chronic kidney disease [facility])       Past Surgical History:   Procedure Laterality Date    CT BIOPSY RENAL  04/06/2022    CT BIOPSY RENAL 4/6/2022 [facility] CT SCAN    SHOULDER SURGERY Bilateral     rotator cuff      Family History   Problem Relation Age of Onset    Deep Vein Thrombosis Mother     Coronary Art Dis Father     GERD Father     Other Father 79        ESRD, hearing los    Other Sister 39        ESRD    GERD Sister     Pulmonary Embolism Sister     Hearing Loss Sister     Cancer Brother         lung, kidney    Hearing Loss Brother     Hearing Loss Brother       Social History     Tobacco Use    Smoking status: Never    Smokeless tobacco: Never   Substance Use Topics    Alcohol use: Yes     Comment: rarely      Current Outpatient Medications   Medication Sig Dispense Refill    Coenzyme Q10 (COQ10) 200 MG CAPS Take by mouth      ciprofloxacin (CILOXAN) 0.3 % ophthalmic solution Place 4 drops in ear(s) three times daily Right ear, keep instilled ear up for 10 min. Warm bottle in pocket to body temperature for 30 minutes before using. Stay on the drops until return visit. 5 mL 2    apixaban (ELIQUIS) 5 MG TABS tablet Take 1 tablet by mouth 2 times daily 180 tablet 0    losartan (COZAAR) 50 MG tablet Take 1 tablet by mouth daily 90 tablet 1    Cholecalciferol (VITAMIN D3) 125 MCG (5000 UT) TABS Take 5,000 Units by mouth daily      b complex vitamins capsule Take 1 capsule by mouth daily      Chromium-Cinnamon (CINNAMON PLUS CHROMIUM PO) Take 2 tablets by mouth daily      ZINC PO Take by mouth      Ascorbic Acid (VITAMIN C) 500 MG CAPS Take 500 mg by mouth daily       No current facility-administered medications for this visit. Allergies   Allergen Reactions    Other      Cotton wood    Penicillins           Review of Systems:   Review of Systems   Pertinent review of systems noted in HPI, all other ROS negative.    Objective:   Physical Exam   BP (!) 144/86 (Site: Right Upper Arm, Position: Sitting, Cuff Size: Medium Adult)   Pulse 87   Temp 98.5 °F (36.9 °C) (Oral)   Resp 18   Ht 5' 10.5\" (1.791 m)   Wt 186 lb (84.4 kg)   SpO2 95%   BMI 26.31 kg/m²    General appearance: No apparent distress, well developed, well nourished, and cooperative. HEENT: Pupils equal, round, and reactive to light. Conjunctivae/corneas clear. Oral mucosa moist. Hard of hearing. Neck: Supple, with full range of motion. Trachea midline. Respiratory:  Normal respiratory effort. Clear to auscultation, bilaterally without Rales/Wheezes/Rhonchi. Cardiovascular: Regular rate and rhythm with normal S1/S2   Abdomen: Soft, active bowel sounds. Musculoskeletal: No clubbing, cyanosis or edema bilaterally. Ambulates in the office. Skin: Skin color, texture, turgor normal.  No visible rashes or lesions. Neurologic:  Neurovascularly intact without any focal sensory/motor deficits. Psychiatric: Alert and oriented      Imaging Studies and Labs:   CBC:   Lab Results   Component Value Date    WBC 7.1 04/20/2022    HGB 13.2 04/20/2022    HCT 41.8 04/20/2022    MCV 99.1 (H) 04/20/2022     04/20/2022     BMP:   Lab Results   Component Value Date/Time     06/07/2022 11:20 AM    K 5.1 06/07/2022 11:20 AM    K 4.6 09/28/2021 12:19 PM     06/07/2022 11:20 AM    CO2 25 06/07/2022 11:20 AM    BUN 19 06/07/2022 11:20 AM    CREATININE 1.3 06/07/2022 11:20 AM    GLUCOSE 90 06/07/2022 11:20 AM    CALCIUM 9.3 06/07/2022 11:20 AM      LFT: No results found for: ALT, AST, GGT, ALKPHOS, BILITOT      Assessment and Plan:   History of Factor V Leiden Mutation   Patient diagnosed with Factor V Leiden over 15 years ago after diagnosis of PE. She had episode of DVT approximately 11 years ago and second DVT in 2021. She has been continued on Eliquis. Discussed with patient due to multiple episodes of VTE and known Factor V Leiden she is recommended long term anticoagulation. She expressed understanding.  She is following with ENT for possible tympanoplasty; however, no scheduled surgery date. Discussed with patient to call when surgery is scheduled. Will arrange anticoagulation recommendations with ENT, Dr. Carlos Alberto Alvarado once surgery is scheduled. Patient will return to office 2 weeks after surgery. All patient questions answered. Pt voiced understanding. Patient agreed with treatment plan. Reviewed assessment and plan with Dr. Chris Fenton. Follow up as directed. Patient instructed to call for questions or concerns.       Electronically signed by   Ranjan Guevara PA-C

## 2023-02-27 NOTE — PATIENT INSTRUCTIONS
Patient will call when surgery is scheduled for blood thinner recommendations   Once surgery is scheduled, will arrange 2 week post op visit in office

## 2023-03-13 ENCOUNTER — TELEPHONE (OUTPATIENT)
Dept: INTERNAL MEDICINE CLINIC | Age: 68
End: 2023-03-13

## 2023-03-13 NOTE — TELEPHONE ENCOUNTER
----- Message from Delgado Dumont MD sent at 3/12/2023  5:39 PM EDT -----  Patient missed 6 month follow up 2/2023 with Jose Roberto - call and re-schedule.

## 2023-03-20 DIAGNOSIS — I10 PRIMARY HYPERTENSION: ICD-10-CM

## 2023-03-20 NOTE — TELEPHONE ENCOUNTER
Patient requesting refills on medication. Called script to [facility] , left prescription information on prescriber's voicemail.

## 2023-03-24 ENCOUNTER — HOSPITAL ENCOUNTER (OUTPATIENT)
Dept: AUDIOLOGY | Age: 68
Discharge: HOME | End: 2023-03-24
Payer: MEDICARE

## 2023-03-24 ENCOUNTER — OFFICE VISIT (OUTPATIENT)
Dept: ENT CLINIC | Age: 68
End: 2023-03-24

## 2023-03-24 VITALS
HEIGHT: 71 IN | HEART RATE: 72 BPM | SYSTOLIC BLOOD PRESSURE: 118 MMHG | TEMPERATURE: 97.6 F | WEIGHT: 187.6 LBS | RESPIRATION RATE: 20 BRPM | OXYGEN SATURATION: 98 % | DIASTOLIC BLOOD PRESSURE: 68 MMHG | BODY MASS INDEX: 26.26 KG/M2

## 2023-03-24 DIAGNOSIS — Z86.711 HISTORY OF PULMONARY EMBOLISM: ICD-10-CM

## 2023-03-24 DIAGNOSIS — Z86.718 HISTORY OF DVT (DEEP VEIN THROMBOSIS): ICD-10-CM

## 2023-03-24 DIAGNOSIS — H72.91 PERFORATION OF RIGHT TYMPANIC MEMBRANE: Primary | ICD-10-CM

## 2023-03-24 DIAGNOSIS — D68.51 FACTOR V LEIDEN (HCC): ICD-10-CM

## 2023-03-24 DIAGNOSIS — H90.A31 MIXED CONDUCTIVE AND SENSORINEURAL HEARING LOSS OF RIGHT EAR WITH RESTRICTED HEARING OF LEFT EAR: ICD-10-CM

## 2023-03-24 DIAGNOSIS — H90.A22 SENSORINEURAL HEARING LOSS (SNHL) OF LEFT EAR WITH RESTRICTED HEARING OF RIGHT EAR: ICD-10-CM

## 2023-03-24 DIAGNOSIS — Z79.01 CURRENT USE OF LONG TERM ANTICOAGULATION: ICD-10-CM

## 2023-03-24 PROCEDURE — 92557 COMPREHENSIVE HEARING TEST: CPT | Performed by: AUDIOLOGIST

## 2023-03-24 RX ORDER — LOSARTAN POTASSIUM 50 MG/1
50 TABLET ORAL DAILY
Qty: 90 TABLET | Refills: 1 | Status: SHIPPED | OUTPATIENT
Start: 2023-03-24

## 2023-03-24 ASSESSMENT — ENCOUNTER SYMPTOMS
CHEST TIGHTNESS: 0
ABDOMINAL PAIN: 0
NAUSEA: 0
DIARRHEA: 0
SORE THROAT: 0
FACIAL SWELLING: 0
RHINORRHEA: 0
STRIDOR: 0
SHORTNESS OF BREATH: 0
TROUBLE SWALLOWING: 0
CHOKING: 0
APNEA: 0
COUGH: 0
VOICE CHANGE: 0
COLOR CHANGE: 0
WHEEZING: 0
SINUS PRESSURE: 0
VOMITING: 0

## 2023-03-24 NOTE — PROGRESS NOTES
AUDIOLOGICAL EVALUATION      REASON FOR TESTING:  Audiometric evaluation per the request of Dr. Shelia Talbot, due to the diagnosis of perforation of right tympanic membrane, mixed hearing loss right ear, sensorineural hearing loss left ear. The patient states that her hearing is still reduced in her right ear relative to her left. No changes in the hearing of her left ear. She denies otalgia, otorrhea, ear fullness/ pressure, tinnitus, dizziness and vertigo. She does not wear hearing aids. OTOSCOPY: Cear canal with normal appearing tympanic membrane- left ear  Dried cerumen across most of the canal preventing clear visualization of the tympanic membrane- right ear      AUDIOGRAM          Reliability: Good    COMMENTS: Audiometry indicates a mild to moderate sensorineural hearing loss in the left ear and a moderate to severe mixed hearing loss in the right ear. Air-bone gaps noted on the right at 250-4000 Hz. Results indicate a stable hearing loss in the left ear and increased hearing loss ( onductive hearing loss ) in the right ear. Speech reception thresholds consistent with pure tone averages, bilaterally. Word recognition scores are excellent at 100%, bilaterally, with speech presented at elevated but comfortable listening levels in quiet. RECOMMENDATION(S):   1- Follow up with Dr. Shelia Talbot today regarding these results and any further testing or treatment recommendations. 2- Repeat testing following any medical management  3- Consider binaural amplification pending medical clearance and patient motivation. Patient advised to contact insurance carrier for in-network hearing aid services providers. 4- Hearing protection in noise.

## 2023-03-27 DIAGNOSIS — I82.401 ACUTE DEEP VEIN THROMBOSIS (DVT) OF RIGHT LOWER EXTREMITY, UNSPECIFIED VEIN (HCC): ICD-10-CM

## 2023-03-27 NOTE — TELEPHONE ENCOUNTER
Called script to [facility] . , left prescription information on prescriber's voicemail .

## 2023-06-19 DIAGNOSIS — R31.29 MICROSCOPIC HEMATURIA: Primary | ICD-10-CM

## 2023-06-19 DIAGNOSIS — N18.31 STAGE 3A CHRONIC KIDNEY DISEASE (HCC): ICD-10-CM

## 2023-06-20 ENCOUNTER — HOSPITAL ENCOUNTER (OUTPATIENT)
Age: 68
Discharge: HOME | End: 2023-06-20
Payer: MEDICARE

## 2023-06-20 DIAGNOSIS — R31.29 MICROSCOPIC HEMATURIA: ICD-10-CM

## 2023-06-20 DIAGNOSIS — N18.31 STAGE 3A CHRONIC KIDNEY DISEASE (HCC): ICD-10-CM

## 2023-06-20 LAB
25(OH)D3 SERPL-MCNC: 46 NG/ML (ref 30–100)
ANION GAP SERPL CALC-SCNC: 14 MEQ/L (ref 8–16)
BUN SERPL-MCNC: 39 MG/DL (ref 7–22)
CALCIUM SERPL-MCNC: 9.4 MG/DL (ref 8.5–10.5)
CHLORIDE SERPL-SCNC: 105 MEQ/L (ref 98–111)
CO2 SERPL-SCNC: 22 MEQ/L (ref 23–33)
CREAT SERPL-MCNC: 1.2 MG/DL (ref 0.4–1.2)
CREAT UR-MCNC: 119.4 MG/DL
DEPRECATED RDW RBC AUTO: 50.2 FL (ref 35–45)
ERYTHROCYTE [DISTWIDTH] IN BLOOD BY AUTOMATED COUNT: 13.6 % (ref 11.5–14.5)
GFR SERPL CREATININE-BSD FRML MDRD: 49 ML/MIN/1.73M2
GLUCOSE SERPL-MCNC: 110 MG/DL (ref 70–108)
HCT VFR BLD AUTO: 42.5 % (ref 37–47)
HGB BLD-MCNC: 13 GM/DL (ref 12–16)
MCH RBC QN AUTO: 30.4 PG (ref 26–33)
MCHC RBC AUTO-ENTMCNC: 30.6 GM/DL (ref 32.2–35.5)
MCV RBC AUTO: 99.3 FL (ref 81–99)
MICROALBUMIN UR-MCNC: 6.49 MG/DL
MICROALBUMIN/CREAT RATIO PNL UR: 54 MG/G (ref 0–30)
PHOSPHATE SERPL-MCNC: 4.1 MG/DL (ref 2.4–4.7)
PLATELET # BLD AUTO: 319 THOU/MM3 (ref 130–400)
PMV BLD AUTO: 9.8 FL (ref 9.4–12.4)
POTASSIUM SERPL-SCNC: 5.1 MEQ/L (ref 3.5–5.2)
PTH-INTACT SERPL-MCNC: 44.9 PG/ML (ref 15–65)
RBC # BLD AUTO: 4.28 MILL/MM3 (ref 4.2–5.4)
SODIUM SERPL-SCNC: 141 MEQ/L (ref 135–145)
WBC # BLD AUTO: 6.6 THOU/MM3 (ref 4.8–10.8)

## 2023-06-20 PROCEDURE — 85027 COMPLETE CBC AUTOMATED: CPT

## 2023-06-20 PROCEDURE — 36415 COLL VENOUS BLD VENIPUNCTURE: CPT

## 2023-06-20 PROCEDURE — 84100 ASSAY OF PHOSPHORUS: CPT

## 2023-06-20 PROCEDURE — 82306 VITAMIN D 25 HYDROXY: CPT

## 2023-06-20 PROCEDURE — 82043 UR ALBUMIN QUANTITATIVE: CPT

## 2023-06-20 PROCEDURE — 83970 ASSAY OF PARATHORMONE: CPT

## 2023-06-20 PROCEDURE — 80048 BASIC METABOLIC PNL TOTAL CA: CPT

## 2023-06-21 ENCOUNTER — OFFICE VISIT (OUTPATIENT)
Dept: NEPHROLOGY | Age: 68
End: 2023-06-21
Payer: MEDICARE

## 2023-06-21 VITALS
WEIGHT: 187.6 LBS | SYSTOLIC BLOOD PRESSURE: 135 MMHG | BODY MASS INDEX: 26.54 KG/M2 | HEART RATE: 85 BPM | DIASTOLIC BLOOD PRESSURE: 74 MMHG | OXYGEN SATURATION: 97 %

## 2023-06-21 DIAGNOSIS — N18.31 STAGE 3A CHRONIC KIDNEY DISEASE (HCC): Primary | ICD-10-CM

## 2023-06-21 PROCEDURE — 1123F ACP DISCUSS/DSCN MKR DOCD: CPT | Performed by: INTERNAL MEDICINE

## 2023-06-21 PROCEDURE — 99213 OFFICE O/P EST LOW 20 MIN: CPT | Performed by: INTERNAL MEDICINE

## 2023-06-21 PROCEDURE — G8417 CALC BMI ABV UP PARAM F/U: HCPCS | Performed by: INTERNAL MEDICINE

## 2023-06-21 PROCEDURE — G8399 PT W/DXA RESULTS DOCUMENT: HCPCS | Performed by: INTERNAL MEDICINE

## 2023-06-21 PROCEDURE — G8427 DOCREV CUR MEDS BY ELIG CLIN: HCPCS | Performed by: INTERNAL MEDICINE

## 2023-06-21 PROCEDURE — 3017F COLORECTAL CA SCREEN DOC REV: CPT | Performed by: INTERNAL MEDICINE

## 2023-06-21 PROCEDURE — 1036F TOBACCO NON-USER: CPT | Performed by: INTERNAL MEDICINE

## 2023-06-21 PROCEDURE — 1090F PRES/ABSN URINE INCON ASSESS: CPT | Performed by: INTERNAL MEDICINE

## 2023-06-21 NOTE — PROGRESS NOTES
[address] KIDNEY AND HYPERTENSION  [address]  [facility] [address]  Dept: [phone]  Loc: [phone]  Progress Note  6/21/2023 1:05 PM      Pt Name:    Eder Andino:    1955  Primary Care Physician:  Destiny Salmon MD     Chief Complaint:   Chief Complaint   Patient presents with    Follow-up     CKD III        History of Present Illness: This is a follow-up visit for CKD III, HTN. She has hx of DVT/PE , factor V Leidin. Serum creatinine 1.0-1.2 mg/dL. Work up revealed proteinuria 730 mg and microscopic hematuria with + KSENIA, 1:160. 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 She is doing well. Has had some weight gain over the past year. Has been going on a lot of vacations with her boyfriend and states she is eating a lot. Bp controlled. On losartan. Has swelling in legs at end of day. Pertinent items are noted in HPI. Past History:  Past Medical History:   Diagnosis Date    Bilateral hearing loss     COPD (chronic obstructive pulmonary disease) (HCC)     Factor V Leiden (HCC)     History of DVT (deep vein thrombosis)     multiple    History of pulmonary embolism     Stage 3a chronic kidney disease (HCC)      Past Surgical History:   Procedure Laterality Date    CT BIOPSY RENAL  04/06/2022    CT BIOPSY RENAL 4/6/2022 [facility] CT SCAN    SHOULDER SURGERY Bilateral     rotator cuff        VITALS:  /74 (Site: Right Upper Arm, Position: Sitting, Cuff Size: Large Adult)   Pulse 85   Wt 187 lb 9.6 oz (85.1 kg)   SpO2 97%   BMI 26.54 kg/m²   Wt Readings from Last 3 Encounters:   06/21/23 187 lb 9.6 oz (85.1 kg)   03/24/23 187 lb 9.6 oz (85.1 kg)   02/27/23 186 lb (84.4 kg)     Body mass index is 26.54 kg/m².      General Appearance: alert and cooperative with exam, appears comfortable, no distress  HEENT: EOMI, moist oral mucus

## 2023-07-20 ENCOUNTER — OFFICE VISIT (OUTPATIENT)
Dept: ENT CLINIC | Age: 68
End: 2023-07-20
Payer: MEDICARE

## 2023-07-20 VITALS
SYSTOLIC BLOOD PRESSURE: 142 MMHG | TEMPERATURE: 97.7 F | HEART RATE: 72 BPM | BODY MASS INDEX: 26.64 KG/M2 | WEIGHT: 190.3 LBS | OXYGEN SATURATION: 99 % | HEIGHT: 71 IN | DIASTOLIC BLOOD PRESSURE: 84 MMHG | RESPIRATION RATE: 18 BRPM

## 2023-07-20 DIAGNOSIS — H72.91 PERFORATION OF RIGHT TYMPANIC MEMBRANE: Primary | ICD-10-CM

## 2023-07-20 DIAGNOSIS — H90.A31 MIXED CONDUCTIVE AND SENSORINEURAL HEARING LOSS OF RIGHT EAR WITH RESTRICTED HEARING OF LEFT EAR: ICD-10-CM

## 2023-07-20 PROCEDURE — 1090F PRES/ABSN URINE INCON ASSESS: CPT | Performed by: OTOLARYNGOLOGY

## 2023-07-20 PROCEDURE — 1123F ACP DISCUSS/DSCN MKR DOCD: CPT | Performed by: OTOLARYNGOLOGY

## 2023-07-20 PROCEDURE — 1036F TOBACCO NON-USER: CPT | Performed by: OTOLARYNGOLOGY

## 2023-07-20 PROCEDURE — 3017F COLORECTAL CA SCREEN DOC REV: CPT | Performed by: OTOLARYNGOLOGY

## 2023-07-20 PROCEDURE — 99214 OFFICE O/P EST MOD 30 MIN: CPT | Performed by: OTOLARYNGOLOGY

## 2023-07-20 PROCEDURE — G8427 DOCREV CUR MEDS BY ELIG CLIN: HCPCS | Performed by: OTOLARYNGOLOGY

## 2023-07-20 PROCEDURE — G8417 CALC BMI ABV UP PARAM F/U: HCPCS | Performed by: OTOLARYNGOLOGY

## 2023-07-20 PROCEDURE — G8399 PT W/DXA RESULTS DOCUMENT: HCPCS | Performed by: OTOLARYNGOLOGY

## 2023-07-20 ASSESSMENT — ENCOUNTER SYMPTOMS
DIARRHEA: 0
SHORTNESS OF BREATH: 0
NAUSEA: 0
SINUS PRESSURE: 0
WHEEZING: 0
ABDOMINAL PAIN: 0
FACIAL SWELLING: 0
COUGH: 0
RHINORRHEA: 0
COLOR CHANGE: 0
STRIDOR: 0
CHOKING: 0
VOICE CHANGE: 0
CHEST TIGHTNESS: 0
TROUBLE SWALLOWING: 0
SORE THROAT: 0
APNEA: 0
VOMITING: 0

## 2023-07-20 NOTE — PROGRESS NOTES
[facility] EAR, NOSE AND THROAT  [address]  Dept: [phone]  Dept Fax: [phone]  Loc: [phone]    Cari Soteol is a 77 y.o. female who was referred by No ref. provider found for:  Chief Complaint   Patient presents with    Follow-up     Patient is here for 2 month f/u to discuss graft on rt ear. Patient said there have been no changes and she is still trying to keep water out of the ear. She states she is getting used to it. Iman [place] HPI:     Cari Sotelo is a 77 y.o. female who presents today for follow-up on her right tympanic membrane perforation. She has had no infections. She has questions regarding the nature of the procedure. Her audiogram is reviewed. She has a bilateral sensorineural high-frequency loss with a superimposed conductive loss on the right side consistent with her perforation. History: Allergies   Allergen Reactions    Other Other (See Comments)     Cotton wood    Penicillins      Current Outpatient Medications   Medication Sig Dispense Refill    Coenzyme Q10 (COQ10) 200 MG CAPS Take by mouth (Patient not taking: Reported on 7/27/2023)      ciprofloxacin (CILOXAN) 0.3 % ophthalmic solution Place 4 drops in ear(s) three times daily Right ear, keep instilled ear up for 10 min. Warm bottle in pocket to body temperature for 30 minutes before using. Stay on the drops until return visit.  5 mL 2    Cholecalciferol (VITAMIN D3) 125 MCG (5000 UT) TABS Take 1 tablet by mouth daily      b complex vitamins capsule Take 1 capsule by mouth daily      Chromium-Cinnamon (CINNAMON PLUS CHROMIUM PO) Take 2 tablets by mouth daily      ZINC PO Take by mouth (Patient not taking: Reported on 7/27/2023)      Ascorbic Acid (VITAMIN C) 500 MG CAPS Take 500 mg by mouth daily      apixaban (ELIQUIS) 5 MG TABS tablet Take 1 tablet by mouth 2 times daily 180 tablet 0    losartan (COZAAR) 50 MG tablet Take

## 2023-07-24 DIAGNOSIS — I82.401 ACUTE DEEP VEIN THROMBOSIS (DVT) OF RIGHT LOWER EXTREMITY, UNSPECIFIED VEIN (HCC): ICD-10-CM

## 2023-07-24 NOTE — TELEPHONE ENCOUNTER
Called script to [facility] . , left prescription information on prescriber's voicemail .

## 2023-07-27 ENCOUNTER — OFFICE VISIT (OUTPATIENT)
Dept: INTERNAL MEDICINE CLINIC | Age: 68
End: 2023-07-27
Payer: MEDICARE

## 2023-07-27 VITALS
BODY MASS INDEX: 26.8 KG/M2 | DIASTOLIC BLOOD PRESSURE: 80 MMHG | SYSTOLIC BLOOD PRESSURE: 122 MMHG | HEART RATE: 92 BPM | HEIGHT: 71 IN | WEIGHT: 191.4 LBS | TEMPERATURE: 98 F

## 2023-07-27 DIAGNOSIS — Z86.718 HISTORY OF DVT OF LOWER EXTREMITY: ICD-10-CM

## 2023-07-27 DIAGNOSIS — J44.9 CHRONIC OBSTRUCTIVE PULMONARY DISEASE, UNSPECIFIED COPD TYPE (HCC): Primary | ICD-10-CM

## 2023-07-27 DIAGNOSIS — R21 RASH: ICD-10-CM

## 2023-07-27 DIAGNOSIS — R06.02 EXERTIONAL SHORTNESS OF BREATH: ICD-10-CM

## 2023-07-27 DIAGNOSIS — I10 PRIMARY HYPERTENSION: ICD-10-CM

## 2023-07-27 PROCEDURE — 3017F COLORECTAL CA SCREEN DOC REV: CPT

## 2023-07-27 PROCEDURE — 99214 OFFICE O/P EST MOD 30 MIN: CPT

## 2023-07-27 PROCEDURE — 3023F SPIROM DOC REV: CPT

## 2023-07-27 PROCEDURE — 93000 ELECTROCARDIOGRAM COMPLETE: CPT

## 2023-07-27 PROCEDURE — 1036F TOBACCO NON-USER: CPT

## 2023-07-27 PROCEDURE — 3078F DIAST BP <80 MM HG: CPT

## 2023-07-27 PROCEDURE — G8417 CALC BMI ABV UP PARAM F/U: HCPCS

## 2023-07-27 PROCEDURE — 1090F PRES/ABSN URINE INCON ASSESS: CPT

## 2023-07-27 PROCEDURE — G8427 DOCREV CUR MEDS BY ELIG CLIN: HCPCS

## 2023-07-27 PROCEDURE — 1123F ACP DISCUSS/DSCN MKR DOCD: CPT

## 2023-07-27 PROCEDURE — 3074F SYST BP LT 130 MM HG: CPT

## 2023-07-27 PROCEDURE — G8399 PT W/DXA RESULTS DOCUMENT: HCPCS

## 2023-07-27 RX ORDER — HYDROCORTISONE VALERATE CREAM 2 MG/G
CREAM TOPICAL
Qty: 60 G | Refills: 1 | Status: SHIPPED | OUTPATIENT
Start: 2023-07-27

## 2023-07-27 RX ORDER — LOSARTAN POTASSIUM 50 MG/1
50 TABLET ORAL DAILY
Qty: 90 TABLET | Refills: 1 | Status: SHIPPED | OUTPATIENT
Start: 2023-07-27

## 2023-07-27 SDOH — ECONOMIC STABILITY: HOUSING INSECURITY
IN THE LAST 12 MONTHS, WAS THERE A TIME WHEN YOU DID NOT HAVE A STEADY PLACE TO SLEEP OR SLEPT IN A SHELTER (INCLUDING NOW)?: NO

## 2023-07-27 SDOH — ECONOMIC STABILITY: FOOD INSECURITY: WITHIN THE PAST 12 MONTHS, THE FOOD YOU BOUGHT JUST DIDN'T LAST AND YOU DIDN'T HAVE MONEY TO GET MORE.: NEVER TRUE

## 2023-07-27 SDOH — ECONOMIC STABILITY: FOOD INSECURITY: WITHIN THE PAST 12 MONTHS, YOU WORRIED THAT YOUR FOOD WOULD RUN OUT BEFORE YOU GOT MONEY TO BUY MORE.: NEVER TRUE

## 2023-07-27 SDOH — ECONOMIC STABILITY: INCOME INSECURITY: HOW HARD IS IT FOR YOU TO PAY FOR THE VERY BASICS LIKE FOOD, HOUSING, MEDICAL CARE, AND HEATING?: NOT HARD AT ALL

## 2023-07-27 ASSESSMENT — PATIENT HEALTH QUESTIONNAIRE - PHQ9
3. TROUBLE FALLING OR STAYING ASLEEP: 0
SUM OF ALL RESPONSES TO PHQ QUESTIONS 1-9: 1
4. FEELING TIRED OR HAVING LITTLE ENERGY: 1
9. THOUGHTS THAT YOU WOULD BE BETTER OFF DEAD, OR OF HURTING YOURSELF: 0
1. LITTLE INTEREST OR PLEASURE IN DOING THINGS: 0
7. TROUBLE CONCENTRATING ON THINGS, SUCH AS READING THE NEWSPAPER OR WATCHING TELEVISION: 0
2. FEELING DOWN, DEPRESSED OR HOPELESS: 0
10. IF YOU CHECKED OFF ANY PROBLEMS, HOW DIFFICULT HAVE THESE PROBLEMS MADE IT FOR YOU TO DO YOUR WORK, TAKE CARE OF THINGS AT HOME, OR GET ALONG WITH OTHER PEOPLE: 0
SUM OF ALL RESPONSES TO PHQ QUESTIONS 1-9: 1
SUM OF ALL RESPONSES TO PHQ QUESTIONS 1-9: 1
SUM OF ALL RESPONSES TO PHQ9 QUESTIONS 1 & 2: 0
6. FEELING BAD ABOUT YOURSELF - OR THAT YOU ARE A FAILURE OR HAVE LET YOURSELF OR YOUR FAMILY DOWN: 0
8. MOVING OR SPEAKING SO SLOWLY THAT OTHER PEOPLE COULD HAVE NOTICED. OR THE OPPOSITE, BEING SO FIGETY OR RESTLESS THAT YOU HAVE BEEN MOVING AROUND A LOT MORE THAN USUAL: 0
SUM OF ALL RESPONSES TO PHQ QUESTIONS 1-9: 1
5. POOR APPETITE OR OVEREATING: 0

## 2023-07-27 ASSESSMENT — ENCOUNTER SYMPTOMS
CONSTIPATION: 0
CHEST TIGHTNESS: 0
DIARRHEA: 0
COUGH: 0
SINUS PAIN: 0
ABDOMINAL PAIN: 0
SHORTNESS OF BREATH: 1

## 2023-07-27 NOTE — PATIENT INSTRUCTIONS
Steroid cream sent to pharmacy. Apply as prescribed  PFT lung testing will call to schedule  Dermatology will call to schedule.

## 2023-08-21 ENCOUNTER — OFFICE VISIT (OUTPATIENT)
Dept: INTERNAL MEDICINE CLINIC | Age: 68
End: 2023-08-21
Payer: MEDICARE

## 2023-08-21 VITALS
TEMPERATURE: 98.1 F | BODY MASS INDEX: 26.52 KG/M2 | HEART RATE: 68 BPM | HEIGHT: 71 IN | SYSTOLIC BLOOD PRESSURE: 130 MMHG | DIASTOLIC BLOOD PRESSURE: 80 MMHG | WEIGHT: 189.4 LBS

## 2023-08-21 DIAGNOSIS — I87.2 VENOUS STASIS DERMATITIS OF BOTH LOWER EXTREMITIES: ICD-10-CM

## 2023-08-21 DIAGNOSIS — M65.331 TRIGGER FINGER, RIGHT MIDDLE FINGER: ICD-10-CM

## 2023-08-21 DIAGNOSIS — Z86.718 HISTORY OF DVT OF LOWER EXTREMITY: ICD-10-CM

## 2023-08-21 DIAGNOSIS — M65.332 TRIGGER FINGER, LEFT MIDDLE FINGER: ICD-10-CM

## 2023-08-21 DIAGNOSIS — Z12.11 COLON CANCER SCREENING: ICD-10-CM

## 2023-08-21 DIAGNOSIS — R06.09 DOE (DYSPNEA ON EXERTION): ICD-10-CM

## 2023-08-21 DIAGNOSIS — J44.9 CHRONIC OBSTRUCTIVE PULMONARY DISEASE, UNSPECIFIED COPD TYPE (HCC): Primary | ICD-10-CM

## 2023-08-21 PROCEDURE — 1036F TOBACCO NON-USER: CPT | Performed by: INTERNAL MEDICINE

## 2023-08-21 PROCEDURE — 3017F COLORECTAL CA SCREEN DOC REV: CPT | Performed by: INTERNAL MEDICINE

## 2023-08-21 PROCEDURE — G8427 DOCREV CUR MEDS BY ELIG CLIN: HCPCS | Performed by: INTERNAL MEDICINE

## 2023-08-21 PROCEDURE — G8399 PT W/DXA RESULTS DOCUMENT: HCPCS | Performed by: INTERNAL MEDICINE

## 2023-08-21 PROCEDURE — G8417 CALC BMI ABV UP PARAM F/U: HCPCS | Performed by: INTERNAL MEDICINE

## 2023-08-21 PROCEDURE — 1090F PRES/ABSN URINE INCON ASSESS: CPT | Performed by: INTERNAL MEDICINE

## 2023-08-21 PROCEDURE — 99214 OFFICE O/P EST MOD 30 MIN: CPT | Performed by: INTERNAL MEDICINE

## 2023-08-21 PROCEDURE — 3023F SPIROM DOC REV: CPT | Performed by: INTERNAL MEDICINE

## 2023-08-21 PROCEDURE — 1123F ACP DISCUSS/DSCN MKR DOCD: CPT | Performed by: INTERNAL MEDICINE

## 2023-08-21 RX ORDER — TRIAMCINOLONE ACETONIDE 1 MG/G
CREAM TOPICAL
COMMUNITY
Start: 2023-08-02

## 2023-08-21 RX ORDER — HYDROXYZINE HYDROCHLORIDE 10 MG/1
TABLET, FILM COATED ORAL
COMMUNITY
Start: 2023-08-02

## 2023-08-21 NOTE — PATIENT INSTRUCTIONS
Need to start wearing compression hose when on your feet and traveling - would like you to put them on in morning and take off at bedtime. Continue to limit salt to 2000 mg a day. Go back to Dermatology if can't stop scratching - ?if candidate for Dupixent.

## 2023-08-27 PROBLEM — R06.09 DOE (DYSPNEA ON EXERTION): Status: ACTIVE | Noted: 2023-08-27

## 2023-08-27 PROBLEM — M65.332 TRIGGER FINGER, LEFT MIDDLE FINGER: Status: ACTIVE | Noted: 2023-08-27

## 2023-08-27 PROBLEM — J44.9 CHRONIC OBSTRUCTIVE PULMONARY DISEASE (HCC): Status: ACTIVE | Noted: 2023-08-27

## 2023-08-27 PROBLEM — I87.2 VENOUS STASIS DERMATITIS OF BOTH LOWER EXTREMITIES: Status: ACTIVE | Noted: 2023-08-27

## 2023-08-27 PROBLEM — M65.331 TRIGGER FINGER, RIGHT MIDDLE FINGER: Status: ACTIVE | Noted: 2023-08-27

## 2023-08-27 PROBLEM — M65.332 TRIGGER FINGER, LEFT MIDDLE FINGER: Status: RESOLVED | Noted: 2023-08-27 | Resolved: 2023-08-27

## 2023-08-31 ENCOUNTER — HOSPITAL ENCOUNTER (OUTPATIENT)
Dept: NON INVASIVE DIAGNOSTICS | Age: 68
Discharge: HOME | End: 2023-08-31
Attending: INTERNAL MEDICINE
Payer: MEDICARE

## 2023-08-31 VITALS — HEIGHT: 70 IN | BODY MASS INDEX: 27.06 KG/M2 | WEIGHT: 189 LBS

## 2023-08-31 DIAGNOSIS — R06.09 DOE (DYSPNEA ON EXERTION): ICD-10-CM

## 2023-08-31 PROCEDURE — 78452 HT MUSCLE IMAGE SPECT MULT: CPT | Performed by: INTERNAL MEDICINE

## 2023-08-31 PROCEDURE — 93017 CV STRESS TEST TRACING ONLY: CPT | Performed by: NUCLEAR MEDICINE

## 2023-08-31 PROCEDURE — A9500 TC99M SESTAMIBI: HCPCS | Performed by: INTERNAL MEDICINE

## 2023-08-31 PROCEDURE — 6360000002 HC RX W HCPCS

## 2023-08-31 PROCEDURE — 3430000000 HC RX DIAGNOSTIC RADIOPHARMACEUTICAL: Performed by: INTERNAL MEDICINE

## 2023-08-31 RX ORDER — TETRAKIS(2-METHOXYISOBUTYLISOCYANIDE)COPPER(I) TETRAFLUOROBORATE 1 MG/ML
35 INJECTION, POWDER, LYOPHILIZED, FOR SOLUTION INTRAVENOUS
Status: COMPLETED | OUTPATIENT
Start: 2023-08-31 | End: 2023-08-31

## 2023-08-31 RX ORDER — TETRAKIS(2-METHOXYISOBUTYLISOCYANIDE)COPPER(I) TETRAFLUOROBORATE 1 MG/ML
10.3 INJECTION, POWDER, LYOPHILIZED, FOR SOLUTION INTRAVENOUS
Status: COMPLETED | OUTPATIENT
Start: 2023-08-31 | End: 2023-08-31

## 2023-08-31 RX ADMIN — Medication 35 MILLICURIE: at 09:55

## 2023-08-31 RX ADMIN — Medication 10.3 MILLICURIE: at 08:20

## 2023-09-06 ENCOUNTER — TELEPHONE (OUTPATIENT)
Dept: INTERNAL MEDICINE CLINIC | Age: 68
End: 2023-09-06

## 2023-09-06 NOTE — TELEPHONE ENCOUNTER
----- Message from Tavo Boudreaux MD sent at 9/1/2023  4:29 PM EDT -----  Please call patient and let them know results were acceptable.

## 2023-11-03 ENCOUNTER — PROCEDURE VISIT (OUTPATIENT)
Dept: ENT CLINIC | Age: 68
End: 2023-11-03

## 2023-11-03 VITALS
OXYGEN SATURATION: 98 % | HEIGHT: 70 IN | WEIGHT: 191.2 LBS | HEART RATE: 77 BPM | DIASTOLIC BLOOD PRESSURE: 82 MMHG | RESPIRATION RATE: 16 BRPM | BODY MASS INDEX: 27.37 KG/M2 | SYSTOLIC BLOOD PRESSURE: 130 MMHG | TEMPERATURE: 96.6 F

## 2023-11-03 DIAGNOSIS — D68.51 FACTOR V LEIDEN (HCC): ICD-10-CM

## 2023-11-03 DIAGNOSIS — Z86.718 HISTORY OF DVT (DEEP VEIN THROMBOSIS): ICD-10-CM

## 2023-11-03 DIAGNOSIS — H90.A31 MIXED CONDUCTIVE AND SENSORINEURAL HEARING LOSS OF RIGHT EAR WITH RESTRICTED HEARING OF LEFT EAR: ICD-10-CM

## 2023-11-03 DIAGNOSIS — H72.91 PERFORATION OF RIGHT TYMPANIC MEMBRANE: Primary | ICD-10-CM

## 2023-11-03 ASSESSMENT — ENCOUNTER SYMPTOMS
VOMITING: 0
SHORTNESS OF BREATH: 0
ABDOMINAL PAIN: 0
STRIDOR: 0
FACIAL SWELLING: 0
CHOKING: 0
COUGH: 0
APNEA: 0
VOICE CHANGE: 0
SINUS PRESSURE: 0
CHEST TIGHTNESS: 0
SORE THROAT: 0
COLOR CHANGE: 0
DIARRHEA: 0
WHEEZING: 0
TROUBLE SWALLOWING: 0
RHINORRHEA: 0
NAUSEA: 0

## 2023-11-03 NOTE — PROGRESS NOTES
[facility] EAR, NOSE AND THROAT  [address]  Dept: [phone]  Dept Fax: [phone]  Loc: [phone]    Paris Ruiz is a 77 y.o. female who was referred by No ref. provider found for:  Chief Complaint   Patient presents with    Procedure     Patient here for in office paper patch. .    HPI:     Paris Ruiz is a 77 y.o. female who presents today for ***. History: Allergies   Allergen Reactions    Other Other (See Comments)     Cotton wood    Penicillins      Current Outpatient Medications   Medication Sig Dispense Refill    triamcinolone (KENALOG) 0.1 % cream apply topically twice a day if needed      hydrOXYzine HCl (ATARAX) 10 MG tablet take 1 TO 2 tablets by mouth nightly if needed      apixaban (ELIQUIS) 5 MG TABS tablet Take 1 tablet by mouth 2 times daily 180 tablet 1    losartan (COZAAR) 50 MG tablet Take 1 tablet by mouth daily 90 tablet 1    hydrocortisone (WESTCORT) 0.2 % cream Apply topically 2 times daily. 60 g 1    Coenzyme Q10 (COQ10) 200 MG CAPS Take by mouth      ciprofloxacin (CILOXAN) 0.3 % ophthalmic solution Place 4 drops in ear(s) three times daily Right ear, keep instilled ear up for 10 min. Warm bottle in pocket to body temperature for 30 minutes before using. Stay on the drops until return visit. 5 mL 2    Cholecalciferol (VITAMIN D3) 125 MCG (5000 UT) TABS Take 1 tablet by mouth daily      b complex vitamins capsule Take 1 capsule by mouth daily      Chromium-Cinnamon (CINNAMON PLUS CHROMIUM PO) Take 2 tablets by mouth daily      ZINC PO Take by mouth      Ascorbic Acid (VITAMIN C) 500 MG CAPS Take 500 mg by mouth daily       No current facility-administered medications for this visit.      Past Medical History:   Diagnosis Date    Bilateral hearing loss     COPD (chronic obstructive pulmonary disease) (formerly [facility])     Factor V Leiden ([address])     History of DVT (deep vein thrombosis)

## 2023-12-14 ENCOUNTER — TELEPHONE (OUTPATIENT)
Dept: ONCOLOGY | Age: 68
End: 2023-12-14

## 2023-12-14 ENCOUNTER — TELEPHONE (OUTPATIENT)
Dept: INTERNAL MEDICINE CLINIC | Age: 68
End: 2023-12-14

## 2023-12-14 ENCOUNTER — OFFICE VISIT (OUTPATIENT)
Dept: ENT CLINIC | Age: 68
End: 2023-12-14

## 2023-12-14 VITALS
WEIGHT: 194.4 LBS | SYSTOLIC BLOOD PRESSURE: 140 MMHG | DIASTOLIC BLOOD PRESSURE: 80 MMHG | OXYGEN SATURATION: 97 % | HEART RATE: 72 BPM | RESPIRATION RATE: 18 BRPM | TEMPERATURE: 97.6 F | HEIGHT: 70 IN | BODY MASS INDEX: 27.83 KG/M2

## 2023-12-14 DIAGNOSIS — H72.91 PERFORATION OF RIGHT TYMPANIC MEMBRANE: Primary | ICD-10-CM

## 2023-12-14 DIAGNOSIS — H90.A31 MIXED CONDUCTIVE AND SENSORINEURAL HEARING LOSS OF RIGHT EAR WITH RESTRICTED HEARING OF LEFT EAR: ICD-10-CM

## 2023-12-14 DIAGNOSIS — Z01.818 PREOP TESTING: Primary | ICD-10-CM

## 2023-12-14 DIAGNOSIS — D68.51 FACTOR V LEIDEN (HCC): ICD-10-CM

## 2023-12-14 PROCEDURE — 99024 POSTOP FOLLOW-UP VISIT: CPT | Performed by: OTOLARYNGOLOGY

## 2023-12-14 NOTE — TELEPHONE ENCOUNTER
Patient is being scheduled for a procedure with Dr Barrios and we are requesting clearance from Dr. Pena.    DOS: 02/21/2024  Procedure: Right tympanoplasty  Meds to hold: CoQ10 x, Vitamin C x 1 week.  Losartan x 2 days.  Requested instruction from hematology for direction on Eliquis.    I have given patient orders to complete CBC, CMP, CXR, EKG.    Please advise by 02/14/2024.  Thank you!

## 2023-12-14 NOTE — PROGRESS NOTES
medications for this visit. Past Medical History:   Diagnosis Date    Bilateral hearing loss     COPD (chronic obstructive pulmonary disease) (HCC)     Factor V Leiden (HCC)     History of DVT (deep vein thrombosis)     multiple    History of pulmonary embolism     History of stasis dermatitis     Dr. Velazco [facility] office - [place] , given triamcinolone 40 mg IM, triamcinolone cream BID prn and hydroxyzine 1-2 tab at bedtime    Osteopenia     low FRAX score    Stage 3a chronic kidney disease ([address])       Past Surgical History:   Procedure Laterality Date    CT BIOPSY RENAL  04/06/2022    CT BIOPSY RENAL 4/6/2022 [facility] CT SCAN    SHOULDER SURGERY Bilateral     rotator cuff     Family History   Problem Relation Age of Onset    Deep Vein Thrombosis Mother     Coronary Art Dis Father     GERD Father     Other Father 79        ESRD, hearing los    Other Sister 39        ESRD    GERD Sister     Pulmonary Embolism Sister     Hearing Loss Sister     Cancer Brother         lung, kidney    Hearing Loss Brother     Hearing Loss Brother      Social History     Tobacco Use    Smoking status: Never     Passive exposure: Never    Smokeless tobacco: Never   Substance Use Topics    Alcohol use: Yes     Comment: rarely       Subjective:      Review of Systems   Constitutional:  Negative for activity change, appetite change, chills, diaphoresis, fatigue, fever and unexpected weight change. HENT:  Negative for congestion, dental problem, ear discharge, ear pain, facial swelling, hearing loss, mouth sores, nosebleeds, postnasal drip, rhinorrhea, sinus pressure, sneezing, sore throat, tinnitus, trouble swallowing and voice change. Eyes:  Negative for visual disturbance. Respiratory:  Negative for apnea, cough, choking, chest tightness, shortness of breath, wheezing and stridor. Cardiovascular:  Negative for chest pain, palpitations and leg swelling. Gastrointestinal:  Negative for abdominal pain, diarrhea, nausea and vomiting.

## 2024-01-02 ENCOUNTER — HOSPITAL ENCOUNTER (OUTPATIENT)
Dept: GENERAL RADIOLOGY | Age: 69
Discharge: HOME | End: 2024-01-02
Payer: MEDICARE

## 2024-01-02 ENCOUNTER — HOSPITAL ENCOUNTER (OUTPATIENT)
Age: 69
Discharge: HOME | End: 2024-01-02
Payer: MEDICARE

## 2024-01-02 DIAGNOSIS — Z01.818 PREOP TESTING: ICD-10-CM

## 2024-01-02 LAB
ALBUMIN SERPL BCG-MCNC: 4.1 G/DL (ref 3.5–5.1)
ALP SERPL-CCNC: 56 U/L (ref 38–126)
ALT SERPL W/O P-5'-P-CCNC: 22 U/L (ref 11–66)
ANION GAP SERPL CALC-SCNC: 11 MEQ/L (ref 8–16)
AST SERPL-CCNC: 22 U/L (ref 5–40)
BASOPHILS ABSOLUTE: 0 THOU/MM3 (ref 0–0.1)
BASOPHILS NFR BLD AUTO: 0.4 %
BILIRUB SERPL-MCNC: 0.5 MG/DL (ref 0.3–1.2)
BUN SERPL-MCNC: 29 MG/DL (ref 7–22)
CALCIUM SERPL-MCNC: 9.3 MG/DL (ref 8.5–10.5)
CHLORIDE SERPL-SCNC: 106 MEQ/L (ref 98–111)
CO2 SERPL-SCNC: 23 MEQ/L (ref 23–33)
CREAT SERPL-MCNC: 1.2 MG/DL (ref 0.4–1.2)
DEPRECATED RDW RBC AUTO: 46.5 FL (ref 35–45)
EOSINOPHIL NFR BLD AUTO: 5.9 %
EOSINOPHILS ABSOLUTE: 0.4 THOU/MM3 (ref 0–0.4)
ERYTHROCYTE [DISTWIDTH] IN BLOOD BY AUTOMATED COUNT: 13 % (ref 11.5–14.5)
GFR SERPL CREATININE-BSD FRML MDRD: 49 ML/MIN/1.73M2
GLUCOSE SERPL-MCNC: 94 MG/DL (ref 70–108)
HCT VFR BLD AUTO: 38.1 % (ref 37–47)
HGB BLD-MCNC: 12 GM/DL (ref 12–16)
IMM GRANULOCYTES # BLD AUTO: 0.01 THOU/MM3 (ref 0–0.07)
IMM GRANULOCYTES NFR BLD AUTO: 0.1 %
LYMPHOCYTES ABSOLUTE: 1.9 THOU/MM3 (ref 1–4.8)
LYMPHOCYTES NFR BLD AUTO: 28.4 %
MCH RBC QN AUTO: 30.5 PG (ref 26–33)
MCHC RBC AUTO-ENTMCNC: 31.5 GM/DL (ref 32.2–35.5)
MCV RBC AUTO: 96.9 FL (ref 81–99)
MONOCYTES ABSOLUTE: 0.7 THOU/MM3 (ref 0.4–1.3)
MONOCYTES NFR BLD AUTO: 10.1 %
NEUTROPHILS NFR BLD AUTO: 55.1 %
NRBC BLD AUTO-RTO: 0 /100 WBC
PLATELET # BLD AUTO: 277 THOU/MM3 (ref 130–400)
PMV BLD AUTO: 9.5 FL (ref 9.4–12.4)
POTASSIUM SERPL-SCNC: 5.1 MEQ/L (ref 3.5–5.2)
PROT SERPL-MCNC: 7 G/DL (ref 6.1–8)
RBC # BLD AUTO: 3.93 MILL/MM3 (ref 4.2–5.4)
SEGMENTED NEUTROPHILS ABSOLUTE COUNT: 3.7 THOU/MM3 (ref 1.8–7.7)
SODIUM SERPL-SCNC: 140 MEQ/L (ref 135–145)
WBC # BLD AUTO: 6.8 THOU/MM3 (ref 4.8–10.8)

## 2024-01-02 PROCEDURE — 36415 COLL VENOUS BLD VENIPUNCTURE: CPT

## 2024-01-02 PROCEDURE — 80053 COMPREHEN METABOLIC PANEL: CPT

## 2024-01-02 PROCEDURE — 93005 ELECTROCARDIOGRAM TRACING: CPT

## 2024-01-02 PROCEDURE — 71046 X-RAY EXAM CHEST 2 VIEWS: CPT

## 2024-01-02 PROCEDURE — 85025 COMPLETE CBC W/AUTO DIFF WBC: CPT

## 2024-01-03 LAB
EKG ATRIAL RATE: 71 BPM
EKG P AXIS: 73 DEGREES
EKG P-R INTERVAL: 134 MS
EKG Q-T INTERVAL: 400 MS
EKG QRS DURATION: 88 MS
EKG QTC CALCULATION (BAZETT): 434 MS
EKG R AXIS: 77 DEGREES
EKG T AXIS: 77 DEGREES
EKG VENTRICULAR RATE: 71 BPM

## 2024-01-03 PROCEDURE — 93010 ELECTROCARDIOGRAM REPORT: CPT | Performed by: INTERNAL MEDICINE

## 2024-01-12 ENCOUNTER — HOSPITAL ENCOUNTER (OUTPATIENT)
Dept: INFUSION THERAPY | Age: 69
Discharge: HOME | End: 2024-01-12
Payer: MEDICARE

## 2024-01-12 ENCOUNTER — OFFICE VISIT (OUTPATIENT)
Dept: ONCOLOGY | Age: 69
End: 2024-01-12
Payer: MEDICARE

## 2024-01-12 VITALS
RESPIRATION RATE: 20 BRPM | SYSTOLIC BLOOD PRESSURE: 177 MMHG | TEMPERATURE: 98.1 F | HEIGHT: 71 IN | DIASTOLIC BLOOD PRESSURE: 80 MMHG | OXYGEN SATURATION: 95 % | BODY MASS INDEX: 27.22 KG/M2 | HEART RATE: 79 BPM | WEIGHT: 194.4 LBS

## 2024-01-12 VITALS
BODY MASS INDEX: 27.22 KG/M2 | WEIGHT: 194.4 LBS | HEIGHT: 71 IN | SYSTOLIC BLOOD PRESSURE: 177 MMHG | RESPIRATION RATE: 20 BRPM | DIASTOLIC BLOOD PRESSURE: 80 MMHG | HEART RATE: 79 BPM | TEMPERATURE: 98.1 F

## 2024-01-12 DIAGNOSIS — Z86.2 HISTORY OF FACTOR V LEIDEN MUTATION: Primary | ICD-10-CM

## 2024-01-12 DIAGNOSIS — Z86.718 HISTORY OF VENOUS THROMBOEMBOLISM: ICD-10-CM

## 2024-01-12 PROCEDURE — 1090F PRES/ABSN URINE INCON ASSESS: CPT | Performed by: PHYSICIAN ASSISTANT

## 2024-01-12 PROCEDURE — G8417 CALC BMI ABV UP PARAM F/U: HCPCS | Performed by: PHYSICIAN ASSISTANT

## 2024-01-12 PROCEDURE — G8427 DOCREV CUR MEDS BY ELIG CLIN: HCPCS | Performed by: PHYSICIAN ASSISTANT

## 2024-01-12 PROCEDURE — 1123F ACP DISCUSS/DSCN MKR DOCD: CPT | Performed by: PHYSICIAN ASSISTANT

## 2024-01-12 PROCEDURE — 99214 OFFICE O/P EST MOD 30 MIN: CPT | Performed by: PHYSICIAN ASSISTANT

## 2024-01-12 PROCEDURE — 1036F TOBACCO NON-USER: CPT | Performed by: PHYSICIAN ASSISTANT

## 2024-01-12 PROCEDURE — G8484 FLU IMMUNIZE NO ADMIN: HCPCS | Performed by: PHYSICIAN ASSISTANT

## 2024-01-12 PROCEDURE — 3017F COLORECTAL CA SCREEN DOC REV: CPT | Performed by: PHYSICIAN ASSISTANT

## 2024-01-12 PROCEDURE — G8399 PT W/DXA RESULTS DOCUMENT: HCPCS | Performed by: PHYSICIAN ASSISTANT

## 2024-01-12 PROCEDURE — 99211 OFF/OP EST MAY X REQ PHY/QHP: CPT

## 2024-01-12 NOTE — PROGRESS NOTES
[facility] [address], [address]  [facility] [address]  Dept: [phone]  Dept Fax: [phone] Loc: [phone]      Visit Date:1/12/2024     Carolynn Wynn is a 68 y.o. female who presents today for:   Chief Complaint   Patient presents with    Follow-up     History of factor V Leiden mutation          HPI:   Carolynn Wynn is a 68 y.o. female referred to Hematology/Oncology clinic for evaluation of Factor V Leiden, history of PE/DVT and perforation of right TM per ENT, Dr. Barrios. She was seen as a new patient on 2/27/23. The patient has been following with ENT for right ear pain and drainage. She has been recommended potential tympanoplasty. She has follow up with Dr. Barrios on 3/24/23 and decision for surgery at that time.   The patient reports she was diagnosed with PE over 15 years ago. She reports she treated initially with Heparin and was on Coumadin for several years. She affirms history of DVT. She states her first DVT occurred approximately 11 years ago following rotator cuff repair. She had 2nd DVT in 2021. Per EMR was diagnosed 9/28/2021 on venous doppler of right lower extremity showing acute DVT of right mid and distal superficial femoral, popliteal, posterior tibial, peroneal and anterior tibial veins. She has been on Eliquis since that time. She states she was diagnosed with Factor V leiden mutation after diagnosis of PE. She is unsure if homozygous or heterozygous. She affirms history of miscarriage. She tolerates Eliquis well. Denies any abnormal bleeding.  PMH includes Factor V Leiden, history of DVT/PE, CKD, HTN, COPD.  Pertinent family history includes positive Factor V Leiden in her brother, niece and sister, likely mother was diagnosed.         Interval History 1/12/2024:   The patient is here for follow up evaluation. She was seen as a new patient on 2/27/23 due to history of Factor V Leiden. She has been following with ENT for perforation of right

## 2024-01-18 NOTE — TELEPHONE ENCOUNTER
I received a call from Bonnie Tillman earlier today stating that patient could hold her Eliquis for 3 days prior to surgery.  She said she had tried to reach patient to advise but did not reach patient. I also just lvm for patient to cb.  There was some confusion that apparently patient thought I had said to hold Eliquis 5 days, but we were waiting on Bonnie's instruction.

## 2024-01-25 ENCOUNTER — TELEPHONE (OUTPATIENT)
Dept: ONCOLOGY | Age: 69
End: 2024-01-25

## 2024-01-25 NOTE — TELEPHONE ENCOUNTER
Received return call from patient. Discussed recommendation to hold Eliquis three days prior to procedure. She was aware as notified by ENT office. All questions answered.

## 2024-02-07 ENCOUNTER — OFFICE VISIT (OUTPATIENT)
Dept: INTERNAL MEDICINE CLINIC | Age: 69
End: 2024-02-07
Payer: MEDICARE

## 2024-02-07 VITALS
DIASTOLIC BLOOD PRESSURE: 90 MMHG | OXYGEN SATURATION: 99 % | BODY MASS INDEX: 27.58 KG/M2 | RESPIRATION RATE: 18 BRPM | HEART RATE: 94 BPM | TEMPERATURE: 97.1 F | WEIGHT: 195 LBS | SYSTOLIC BLOOD PRESSURE: 177 MMHG

## 2024-02-07 DIAGNOSIS — Z01.818 PREOP EXAM FOR INTERNAL MEDICINE: Primary | ICD-10-CM

## 2024-02-07 DIAGNOSIS — I10 PRIMARY HYPERTENSION: ICD-10-CM

## 2024-02-07 DIAGNOSIS — H72.91 PERFORATION OF RIGHT TYMPANIC MEMBRANE: ICD-10-CM

## 2024-02-07 DIAGNOSIS — D68.51 FACTOR V LEIDEN (HCC): ICD-10-CM

## 2024-02-07 DIAGNOSIS — R06.09 DOE (DYSPNEA ON EXERTION): ICD-10-CM

## 2024-02-07 DIAGNOSIS — J44.9 MILD CHRONIC OBSTRUCTIVE PULMONARY DISEASE (HCC): ICD-10-CM

## 2024-02-07 PROCEDURE — G8484 FLU IMMUNIZE NO ADMIN: HCPCS | Performed by: STUDENT IN AN ORGANIZED HEALTH CARE EDUCATION/TRAINING PROGRAM

## 2024-02-07 PROCEDURE — G8399 PT W/DXA RESULTS DOCUMENT: HCPCS | Performed by: STUDENT IN AN ORGANIZED HEALTH CARE EDUCATION/TRAINING PROGRAM

## 2024-02-07 PROCEDURE — G8417 CALC BMI ABV UP PARAM F/U: HCPCS | Performed by: STUDENT IN AN ORGANIZED HEALTH CARE EDUCATION/TRAINING PROGRAM

## 2024-02-07 PROCEDURE — G8427 DOCREV CUR MEDS BY ELIG CLIN: HCPCS | Performed by: STUDENT IN AN ORGANIZED HEALTH CARE EDUCATION/TRAINING PROGRAM

## 2024-02-07 PROCEDURE — 1036F TOBACCO NON-USER: CPT | Performed by: STUDENT IN AN ORGANIZED HEALTH CARE EDUCATION/TRAINING PROGRAM

## 2024-02-07 PROCEDURE — 3077F SYST BP >= 140 MM HG: CPT | Performed by: STUDENT IN AN ORGANIZED HEALTH CARE EDUCATION/TRAINING PROGRAM

## 2024-02-07 PROCEDURE — 1090F PRES/ABSN URINE INCON ASSESS: CPT | Performed by: STUDENT IN AN ORGANIZED HEALTH CARE EDUCATION/TRAINING PROGRAM

## 2024-02-07 PROCEDURE — 99214 OFFICE O/P EST MOD 30 MIN: CPT | Performed by: STUDENT IN AN ORGANIZED HEALTH CARE EDUCATION/TRAINING PROGRAM

## 2024-02-07 PROCEDURE — 3017F COLORECTAL CA SCREEN DOC REV: CPT | Performed by: STUDENT IN AN ORGANIZED HEALTH CARE EDUCATION/TRAINING PROGRAM

## 2024-02-07 PROCEDURE — 3079F DIAST BP 80-89 MM HG: CPT | Performed by: STUDENT IN AN ORGANIZED HEALTH CARE EDUCATION/TRAINING PROGRAM

## 2024-02-07 PROCEDURE — 94010 BREATHING CAPACITY TEST: CPT | Performed by: STUDENT IN AN ORGANIZED HEALTH CARE EDUCATION/TRAINING PROGRAM

## 2024-02-07 PROCEDURE — 1123F ACP DISCUSS/DSCN MKR DOCD: CPT | Performed by: STUDENT IN AN ORGANIZED HEALTH CARE EDUCATION/TRAINING PROGRAM

## 2024-02-07 PROCEDURE — 3023F SPIROM DOC REV: CPT | Performed by: STUDENT IN AN ORGANIZED HEALTH CARE EDUCATION/TRAINING PROGRAM

## 2024-02-07 RX ORDER — ALBUTEROL SULFATE 90 UG/1
2 AEROSOL, METERED RESPIRATORY (INHALATION) 4 TIMES DAILY PRN
Qty: 18 G | Refills: 0 | Status: SHIPPED | OUTPATIENT
Start: 2024-02-07

## 2024-02-07 RX ORDER — LOSARTAN POTASSIUM 100 MG/1
100 TABLET ORAL DAILY
Qty: 90 TABLET | Refills: 0 | Status: SHIPPED | OUTPATIENT
Start: 2024-02-07 | End: 2024-05-07

## 2024-02-07 NOTE — PROGRESS NOTES
1955    Chief Complaint   Patient presents with    Pre-op Exam     2/21 Tympanoplasty without mastoidectomy without ossicle reconctruction- Dr. Barrios       Pt is a 68 y.o. female who presents for a preoperative medical consultation at the request of Dr. Barrios prior to right tympanoplasty.  Pt complains of right pain and hearing loss with no alleviating or aggravating factors. Pt has failed conservative measure with paper patch and requires further definitive management.    Reactions to anesthesia: none    History of excessive bleeding: none    History of blood clots: Yes DVT and PE - Factor V Leiden Chronically on Eliquis    History of blood transfusions: none      Reactions to blood transfusion: none     Past Medical History:   Diagnosis Date    Bilateral hearing loss     COPD (chronic obstructive pulmonary disease) (HCC)     Factor V Leiden (HCC)     History of DVT (deep vein thrombosis)     multiple    History of pulmonary embolism     History of stasis dermatitis     Dr. Chavez office - Ez, given triamcinolone 40 mg IM, triamcinolone cream BID prn and hydroxyzine 1-2 tab at bedtime    Osteopenia     low FRAX score    Stage 3a chronic kidney disease (HCC)        Past Surgical History:   Procedure Laterality Date    CT BIOPSY RENAL  04/06/2022    CT BIOPSY RENAL 4/6/2022 [facility] CT SCAN    SHOULDER SURGERY Bilateral     rotator cuff       Current Outpatient Medications   Medication Sig Dispense Refill    albuterol sulfate HFA (VENTOLIN HFA) 108 (90 Base) MCG/ACT inhaler Inhale 2 puffs into the lungs 4 times daily as needed for Wheezing 18 g 0    losartan (COZAAR) 100 MG tablet Take 1 tablet by mouth daily 90 tablet 0    triamcinolone (KENALOG) 0.1 % cream apply topically twice a day if needed      hydrOXYzine HCl (ATARAX) 10 MG tablet take 1 TO 2 tablets by mouth nightly if needed      apixaban (ELIQUIS) 5 MG TABS tablet Take 1 tablet by mouth 2 times daily 180 tablet 1    hydrocortisone (WESTCORT) 0.2 %

## 2024-02-07 NOTE — PATIENT INSTRUCTIONS
Increase Losartan to 100mg daily  Monitor Blood pressure twice daily for 1 week after starting increased dose  Notify office of low blood pressure  Follow up in 1 month with labwork before appt    Start Spiriva inhaler daily  Albuterol inhaler as needed for SOB/Wheezing      Continue Losartan including day of surgery  Hold Eliquis for 3 days before surgery  Resume Eliquis at ENT discretion

## 2024-02-13 ENCOUNTER — OFFICE VISIT (OUTPATIENT)
Dept: INTERNAL MEDICINE CLINIC | Age: 69
End: 2024-02-13
Payer: MEDICARE

## 2024-02-13 VITALS
OXYGEN SATURATION: 99 % | DIASTOLIC BLOOD PRESSURE: 96 MMHG | WEIGHT: 199 LBS | HEART RATE: 72 BPM | TEMPERATURE: 97.6 F | HEIGHT: 71 IN | BODY MASS INDEX: 27.86 KG/M2 | SYSTOLIC BLOOD PRESSURE: 186 MMHG

## 2024-02-13 DIAGNOSIS — B34.9 VIRAL ILLNESS: Primary | ICD-10-CM

## 2024-02-13 DIAGNOSIS — I10 HYPERTENSION, UNSPECIFIED TYPE: ICD-10-CM

## 2024-02-13 DIAGNOSIS — R05.1 ACUTE COUGH: ICD-10-CM

## 2024-02-13 PROCEDURE — G8427 DOCREV CUR MEDS BY ELIG CLIN: HCPCS

## 2024-02-13 PROCEDURE — G8417 CALC BMI ABV UP PARAM F/U: HCPCS

## 2024-02-13 PROCEDURE — 3077F SYST BP >= 140 MM HG: CPT

## 2024-02-13 PROCEDURE — 99212 OFFICE O/P EST SF 10 MIN: CPT

## 2024-02-13 PROCEDURE — 1036F TOBACCO NON-USER: CPT

## 2024-02-13 PROCEDURE — 3080F DIAST BP >= 90 MM HG: CPT

## 2024-02-13 PROCEDURE — G8484 FLU IMMUNIZE NO ADMIN: HCPCS

## 2024-02-13 PROCEDURE — G8399 PT W/DXA RESULTS DOCUMENT: HCPCS

## 2024-02-13 PROCEDURE — 1123F ACP DISCUSS/DSCN MKR DOCD: CPT

## 2024-02-13 PROCEDURE — 3017F COLORECTAL CA SCREEN DOC REV: CPT

## 2024-02-13 PROCEDURE — 1090F PRES/ABSN URINE INCON ASSESS: CPT

## 2024-02-13 RX ORDER — HYDROCHLOROTHIAZIDE 12.5 MG/1
25 CAPSULE, GELATIN COATED ORAL EVERY MORNING
Qty: 60 CAPSULE | Refills: 0 | Status: SHIPPED | OUTPATIENT
Start: 2024-02-13

## 2024-02-13 RX ORDER — GUAIFENESIN/DEXTROMETHORPHAN 100-10MG/5
5 SYRUP ORAL 3 TIMES DAILY PRN
Qty: 120 ML | Refills: 0 | Status: SHIPPED | OUTPATIENT
Start: 2024-02-13 | End: 2024-02-23

## 2024-02-13 RX ORDER — AMLODIPINE BESYLATE 10 MG/1
10 TABLET ORAL DAILY
Qty: 30 TABLET | Refills: 0 | Status: SHIPPED | OUTPATIENT
Start: 2024-02-14 | End: 2024-03-15

## 2024-02-13 RX ORDER — BENZONATATE 200 MG/1
200 CAPSULE ORAL 3 TIMES DAILY PRN
Qty: 21 CAPSULE | Refills: 0 | Status: SHIPPED | OUTPATIENT
Start: 2024-02-13 | End: 2024-02-20

## 2024-02-13 NOTE — PROGRESS NOTES
1955    Chief Complaint   Patient presents with    Cough     A lot of phlegm in back of throat-symptoms started on 2/8/24    Fatigue    chest tightness       68 y.o. female who follows Dr. Pena as PCP.   She is Qu[employer].   She has a PMHx of HTN, CKD, osteopenia, Hx of Factor V Leidien Deficiency on Eliquis.     2/13/24:   Pt is here for complaint of cough. Is feeling fatigued and is tired. Is complaining of chest congestion. Runny nose. Pt BP is also elevated in clinic. Planned ENT procedure with Dr. Barrios.     HPI    Viral Ilness  Pt is having the cold. Fatigue, cough, runny nose.   - Will start on tessalon pearls and robitussin.   - PRN Flonase    HTN:  BP Today: 186/96 has been elevated on multiple previous visits.   - Home med of losartan 100 mg, HOLD.   - ENT procedure planned, ENT does not want losartan.   - Will start on Norvasc 10 mg and HCTZ 25 mg daily.     CKD stage IIIa:   Glomerumegaly/ Thin BM Dx: Renal Biopsy done 4/2022.   CMP 01/2/2024: BUN/Cr: 29/1.2. eGFR 49.  Microalbumin/CR ratio 6/2023: 54.   Follows Dr. Pathak  - On Losartan 100 mg, continue.   - Recheck BMP/ Microalbumin/Cr ratio.     Mild COPD/exertional SOB  PFT 02/2024: FEV1/FVC: 0.65.    Stress test 8/2023: Negative for ischemia.   - ECHO ordered, did not complete.   - Home inhaler of albuterol + Spiriva.     Osteopenia: DEXA scan 09/2022.   - Discussed fall risk strategy .   - Recommended exercise therapy to improve strength. No plans for PT/OT at this time.   - On Vit D tablets.   - Recheck DEXA scan this year.     Right ear ruptured tympanic membrane: Follows Dr. Barrios, plan for right tympanoplasty 2/21/2024.    Hx of factor V Leiden/Hx of multiple DVTs/Hx of PE: On Eliquis.   Pt is following with Heme-Onc clinic of Dr. Mcneill.     Stasis dermatitis: Follows with Dr. Chavez    Preventative Screening:   Colonoscopy: Has not had one over 10 yrs, Fecal Blood Immunochemical Test ordered 08/2023, not done.  -

## 2024-02-13 NOTE — PROGRESS NOTES
Dr. Barrios, the patient's ENT surgeon contacted me today. He is concerned regarding the use of ARB agent at surgery in terms of operative hypotension and operative site swelling. We discussed that Ms. Wynn had a number of elevated BP values in the 170's systolic range documented. He agrees with BP treatment but requests a non-ACE-I,non-ARB, non-peripheral Alpha blocker agent. We discussed use of amlodipine which will have vasodilatory properties and he felt this was an acceptable choice for the planned Feb 21 surgery.    Patient phoned at [phone], message left on answering machine: Stop Losartan after today, begin Amlodipine 10 mg daily starting tomorrow and will carry this through to 6 day postoperative check. If BP well controlled at ENT on Amlodipine postop, may contine this. If not, switch back to Losartan 100 mg/day and follow-up with IM office. Pt may call IM office with any questions, per message left for her.    Rx for Amlodipine 10 mg, #30 - 1 daily to start tomorrow placed electronically at [facility], [address] as listed in EHR.

## 2024-02-16 RX ORDER — TIOTROPIUM BROMIDE 18 UG/1
18 CAPSULE ORAL; RESPIRATORY (INHALATION) DAILY
Qty: 30 CAPSULE | Refills: 0 | COMMUNITY
Start: 2024-02-16

## 2024-02-19 ENCOUNTER — TELEPHONE (OUTPATIENT)
Dept: ENT CLINIC | Age: 69
End: 2024-02-19

## 2024-02-20 NOTE — TELEPHONE ENCOUNTER
I called patient to see how she was doing and she is still having cough and stuffy nose, congestion. Sounded ill.  I told her I will call her later today or tomorrow to r/s to another day. She voiced understanding.

## 2024-02-21 ENCOUNTER — TELEPHONE (OUTPATIENT)
Dept: ENT CLINIC | Age: 69
End: 2024-02-21

## 2024-02-21 ENCOUNTER — TELEPHONE (OUTPATIENT)
Dept: ONCOLOGY | Age: 69
End: 2024-02-21

## 2024-02-21 NOTE — TELEPHONE ENCOUNTER
Just FYI, patient had to reschedule tympanoplasty surgery from 02/21/2024 to 04/01/2024 with Dr. Barrios due to illness.  Carolyn had instructed to hold Eliquis x 3 days prior to surgery.  Assuming there will be no changes to that, but she is scheduled for follow up in your office with [place] 03/05/2024.  Thank you!

## 2024-02-21 NOTE — TELEPHONE ENCOUNTER
Lvm yesterday and today to get procedure r/s.  Holding time 04/01.  If we go later than that date, will need new labs.  Patient called right back and was good w 04/01

## 2024-02-28 ENCOUNTER — HOSPITAL ENCOUNTER (OUTPATIENT)
Age: 69
Discharge: HOME | End: 2024-03-01
Payer: MEDICARE

## 2024-02-28 VITALS
SYSTOLIC BLOOD PRESSURE: 186 MMHG | WEIGHT: 199 LBS | BODY MASS INDEX: 28.49 KG/M2 | DIASTOLIC BLOOD PRESSURE: 96 MMHG | HEIGHT: 70 IN

## 2024-02-28 DIAGNOSIS — R06.09 DOE (DYSPNEA ON EXERTION): ICD-10-CM

## 2024-02-28 LAB
ECHO AO ASC DIAM: 2.6 CM
ECHO AO ASCENDING AORTA INDEX: 1.25 CM/M2
ECHO AV CUSP MM: 1.9 CM
ECHO AV PEAK GRADIENT: 11 MMHG
ECHO AV PEAK VELOCITY: 1.6 M/S
ECHO AV VELOCITY RATIO: 0.63
ECHO BSA: 2.11 M2
ECHO EST RA PRESSURE: 5 MMHG
ECHO LA AREA 2C: 20.5 CM2
ECHO LA AREA 4C: 14.7 CM2
ECHO LA DIAMETER INDEX: 1.83 CM/M2
ECHO LA DIAMETER: 3.8 CM
ECHO LA MAJOR AXIS: 4.8 CM
ECHO LA MINOR AXIS: 5.9 CM
ECHO LA VOL BP: 51 ML (ref 22–52)
ECHO LA VOL MOD A2C: 59 ML (ref 22–52)
ECHO LA VOL MOD A4C: 36 ML (ref 22–52)
ECHO LA VOL/BSA BIPLANE: 25 ML/M2 (ref 16–34)
ECHO LA VOLUME INDEX MOD A2C: 28 ML/M2 (ref 16–34)
ECHO LA VOLUME INDEX MOD A4C: 17 ML/M2 (ref 16–34)
ECHO LV E' LATERAL VELOCITY: 7 CM/S
ECHO LV E' SEPTAL VELOCITY: 6 CM/S
ECHO LV FRACTIONAL SHORTENING: 29 % (ref 28–44)
ECHO LV INTERNAL DIMENSION DIASTOLE INDEX: 1.97 CM/M2
ECHO LV INTERNAL DIMENSION DIASTOLIC: 4.1 CM (ref 3.9–5.3)
ECHO LV INTERNAL DIMENSION SYSTOLIC INDEX: 1.39 CM/M2
ECHO LV INTERNAL DIMENSION SYSTOLIC: 2.9 CM
ECHO LV ISOVOLUMETRIC RELAXATION TIME (IVRT): 60 MS
ECHO LV IVSD: 1 CM (ref 0.6–0.9)
ECHO LV MASS 2D: 132.1 G (ref 67–162)
ECHO LV MASS INDEX 2D: 63.5 G/M2 (ref 43–95)
ECHO LV POSTERIOR WALL DIASTOLIC: 1 CM (ref 0.6–0.9)
ECHO LV RELATIVE WALL THICKNESS RATIO: 0.49
ECHO LVOT PEAK GRADIENT: 4 MMHG
ECHO LVOT PEAK VELOCITY: 1 M/S
ECHO MV A VELOCITY: 1.17 M/S
ECHO MV E DECELERATION TIME (DT): 256 MS
ECHO MV E VELOCITY: 0.92 M/S
ECHO MV E/A RATIO: 0.79
ECHO MV E/E' LATERAL: 13.14
ECHO MV E/E' RATIO (AVERAGED): 14.24
ECHO MV REGURGITANT PEAK GRADIENT: 112 MMHG
ECHO MV REGURGITANT PEAK VELOCITY: 5.3 M/S
ECHO PV MAX VELOCITY: 0.8 M/S
ECHO PV PEAK GRADIENT: 3 MMHG
ECHO RIGHT VENTRICULAR SYSTOLIC PRESSURE (RVSP): 25 MMHG
ECHO RV INTERNAL DIMENSION: 2.2 CM
ECHO RV TAPSE: 1.9 CM (ref 1.7–?)
ECHO TV E WAVE: 0.6 M/S
ECHO TV REGURGITANT MAX VELOCITY: 2.26 M/S
ECHO TV REGURGITANT PEAK GRADIENT: 20 MMHG

## 2024-02-28 PROCEDURE — 93306 TTE W/DOPPLER COMPLETE: CPT

## 2024-03-04 DIAGNOSIS — I10 HYPERTENSION, UNSPECIFIED TYPE: ICD-10-CM

## 2024-03-05 RX ORDER — HYDROCHLOROTHIAZIDE 12.5 MG/1
25 CAPSULE, GELATIN COATED ORAL EVERY MORNING
Qty: 60 CAPSULE | Refills: 0 | OUTPATIENT
Start: 2024-03-05

## 2024-03-07 ENCOUNTER — TELEPHONE (OUTPATIENT)
Dept: ENT CLINIC | Age: 69
End: 2024-03-07

## 2024-03-07 ENCOUNTER — OFFICE VISIT (OUTPATIENT)
Dept: ENT CLINIC | Age: 69
End: 2024-03-07
Payer: MEDICARE

## 2024-03-07 VITALS
WEIGHT: 195.3 LBS | HEART RATE: 80 BPM | TEMPERATURE: 97.3 F | DIASTOLIC BLOOD PRESSURE: 78 MMHG | OXYGEN SATURATION: 96 % | RESPIRATION RATE: 20 BRPM | HEIGHT: 70 IN | SYSTOLIC BLOOD PRESSURE: 140 MMHG | BODY MASS INDEX: 27.96 KG/M2

## 2024-03-07 DIAGNOSIS — H72.91 PERFORATION OF RIGHT TYMPANIC MEMBRANE: ICD-10-CM

## 2024-03-07 DIAGNOSIS — H92.11 PURULENT OTORRHEA OF RIGHT EAR: Primary | ICD-10-CM

## 2024-03-07 PROCEDURE — 1090F PRES/ABSN URINE INCON ASSESS: CPT | Performed by: OTOLARYNGOLOGY

## 2024-03-07 PROCEDURE — 1036F TOBACCO NON-USER: CPT | Performed by: OTOLARYNGOLOGY

## 2024-03-07 PROCEDURE — G8399 PT W/DXA RESULTS DOCUMENT: HCPCS | Performed by: OTOLARYNGOLOGY

## 2024-03-07 PROCEDURE — G8417 CALC BMI ABV UP PARAM F/U: HCPCS | Performed by: OTOLARYNGOLOGY

## 2024-03-07 PROCEDURE — 3017F COLORECTAL CA SCREEN DOC REV: CPT | Performed by: OTOLARYNGOLOGY

## 2024-03-07 PROCEDURE — 1123F ACP DISCUSS/DSCN MKR DOCD: CPT | Performed by: OTOLARYNGOLOGY

## 2024-03-07 PROCEDURE — G8427 DOCREV CUR MEDS BY ELIG CLIN: HCPCS | Performed by: OTOLARYNGOLOGY

## 2024-03-07 PROCEDURE — G8484 FLU IMMUNIZE NO ADMIN: HCPCS | Performed by: OTOLARYNGOLOGY

## 2024-03-07 PROCEDURE — 99214 OFFICE O/P EST MOD 30 MIN: CPT | Performed by: OTOLARYNGOLOGY

## 2024-03-07 PROCEDURE — 92504 EAR MICROSCOPY EXAMINATION: CPT | Performed by: OTOLARYNGOLOGY

## 2024-03-07 RX ORDER — CEFDINIR 300 MG/1
300 CAPSULE ORAL 2 TIMES DAILY
Qty: 20 CAPSULE | Refills: 0 | Status: SHIPPED | OUTPATIENT
Start: 2024-03-07 | End: 2024-03-14

## 2024-03-07 RX ORDER — CIPROFLOXACIN HYDROCHLORIDE 3.5 MG/ML
4 SOLUTION/ DROPS TOPICAL 3 TIMES DAILY
Qty: 5 ML | Refills: 3 | Status: SHIPPED | OUTPATIENT
Start: 2024-03-07 | End: 2024-04-06

## 2024-03-07 NOTE — TELEPHONE ENCOUNTER
Patient left message on clinical voicemail stating she thinks she has an ear infection. She is scheduled for ear surgery with Dr Barrios on 1/1/24 and doesn't want anything to mess up her surgery. Patient stated it has a bad smell, itchy, and some loose wax in it but denies any pain.     Spoke to Dr Barrios. He wants to see the patient today to get infection cleared up prior to surgery.     Called patient back and offered her an appointment at 2:00 today with Dr Barrios. Patient accepted appointment.

## 2024-03-07 NOTE — PROGRESS NOTES
[facility] [place] [facility]  [facility] EAR, NOSE AND THROAT  [address]  [address]  [facility] [address]  Dept: [phone]  Dept Fax: [phone]  Loc: [phone]    Carolynn Wynn is a 68 y.o. female who was referred by No ref. provider found for:  Chief Complaint   Patient presents with    Other     Patient here for possible ear infection,bad smell and thin wax. Patient states her right ear has drainage and it smells   .    HPI:     Carolynn Wynn is a 68 y.o. female who presents today for smelly otorrhea right ear.  There is some discomfort but not severe pain.  She has a known perforation and is scheduled for tympanoplasty in 2 or 3 weeks.    History:     Allergies   Allergen Reactions    Other Other (See Comments)     Cotton wood    Penicillins      Current Outpatient Medications   Medication Sig Dispense Refill    ciprofloxacin (CILOXAN) 0.3 % ophthalmic solution Place 4 drops in ear(s) 3 times daily Right ear, keep instilled ear up for 10 min.  Warm to body temperature before use by placing in pocket 30 minutes 5 mL 3    cefdinir (OMNICEF) 300 MG capsule Take 1 capsule by mouth 2 times daily for 7 days 20 capsule 0    tiotropium (SPIRIVA HANDIHALER) 18 MCG inhalation capsule Inhale 1 capsule into the lungs daily Lot# [id number]    Exp 4/2025 (Patient not taking: Reported on 3/14/2024) 30 capsule 0    amLODIPine (NORVASC) 10 MG tablet Take 1 tablet by mouth daily Do not take Losartan while using this medication 30 tablet 0    hydroCHLOROthiazide 12.5 MG capsule Take 2 capsules by mouth every morning 60 capsule 0    albuterol sulfate HFA (VENTOLIN HFA) 108 (90 Base) MCG/ACT inhaler Inhale 2 puffs into the lungs 4 times daily as needed for Wheezing (Patient not taking: Reported on 3/14/2024) 18 g 0    triamcinolone (KENALOG) 0.1 % cream apply topically twice a day if needed      apixaban (ELIQUIS) 5 MG TABS tablet Take 1 tablet by mouth 2 times daily 180 tablet 1

## 2024-03-08 DIAGNOSIS — I10 PRIMARY HYPERTENSION: ICD-10-CM

## 2024-03-11 LAB
BACTERIA SPEC AEROBE CULT: NORMAL
GRAM STN SPEC: NORMAL

## 2024-03-14 ENCOUNTER — TELEPHONE (OUTPATIENT)
Dept: ONCOLOGY | Age: 69
End: 2024-03-14

## 2024-03-14 ENCOUNTER — TELEPHONE (OUTPATIENT)
Dept: INTERNAL MEDICINE CLINIC | Age: 69
End: 2024-03-14

## 2024-03-14 ENCOUNTER — HOSPITAL ENCOUNTER (OUTPATIENT)
Age: 69
Discharge: HOME | End: 2024-03-14
Payer: MEDICARE

## 2024-03-14 ENCOUNTER — OFFICE VISIT (OUTPATIENT)
Dept: ENT CLINIC | Age: 69
End: 2024-03-14
Payer: MEDICARE

## 2024-03-14 VITALS
DIASTOLIC BLOOD PRESSURE: 70 MMHG | RESPIRATION RATE: 18 BRPM | SYSTOLIC BLOOD PRESSURE: 146 MMHG | WEIGHT: 198.9 LBS | HEART RATE: 96 BPM | BODY MASS INDEX: 28.47 KG/M2 | HEIGHT: 70 IN | TEMPERATURE: 96.9 F | OXYGEN SATURATION: 98 %

## 2024-03-14 DIAGNOSIS — H92.11 PURULENT OTORRHEA OF RIGHT EAR: ICD-10-CM

## 2024-03-14 DIAGNOSIS — Z01.818 PREOP TESTING: ICD-10-CM

## 2024-03-14 DIAGNOSIS — H72.91 PERFORATION OF RIGHT TYMPANIC MEMBRANE: Primary | ICD-10-CM

## 2024-03-14 DIAGNOSIS — Z86.711 HISTORY OF PULMONARY EMBOLISM: ICD-10-CM

## 2024-03-14 DIAGNOSIS — Z86.718 HISTORY OF DVT (DEEP VEIN THROMBOSIS): ICD-10-CM

## 2024-03-14 DIAGNOSIS — D68.51 FACTOR V LEIDEN (HCC): ICD-10-CM

## 2024-03-14 DIAGNOSIS — I10 PRIMARY HYPERTENSION: ICD-10-CM

## 2024-03-14 DIAGNOSIS — Z01.818 PREOP TESTING: Primary | ICD-10-CM

## 2024-03-14 LAB
ANION GAP SERPL CALC-SCNC: 14 MEQ/L (ref 8–16)
BASOPHILS ABSOLUTE: 0 THOU/MM3 (ref 0–0.1)
BASOPHILS NFR BLD AUTO: 0.5 %
BUN SERPL-MCNC: 32 MG/DL (ref 7–22)
CALCIUM SERPL-MCNC: 10.1 MG/DL (ref 8.5–10.5)
CHLORIDE SERPL-SCNC: 101 MEQ/L (ref 98–111)
CO2 SERPL-SCNC: 24 MEQ/L (ref 23–33)
CREAT SERPL-MCNC: 1.7 MG/DL (ref 0.4–1.2)
DEPRECATED RDW RBC AUTO: 46.8 FL (ref 35–45)
EOSINOPHIL NFR BLD AUTO: 3.5 %
EOSINOPHILS ABSOLUTE: 0.3 THOU/MM3 (ref 0–0.4)
ERYTHROCYTE [DISTWIDTH] IN BLOOD BY AUTOMATED COUNT: 13.6 % (ref 11.5–14.5)
GFR SERPL CREATININE-BSD FRML MDRD: 32 ML/MIN/1.73M2
GLUCOSE SERPL-MCNC: 101 MG/DL (ref 70–108)
HCT VFR BLD AUTO: 40.4 % (ref 37–47)
HGB BLD-MCNC: 13.2 GM/DL (ref 12–16)
IMM GRANULOCYTES # BLD AUTO: 0.02 THOU/MM3 (ref 0–0.07)
IMM GRANULOCYTES NFR BLD AUTO: 0.3 %
LYMPHOCYTES ABSOLUTE: 2 THOU/MM3 (ref 1–4.8)
LYMPHOCYTES NFR BLD AUTO: 26 %
MCH RBC QN AUTO: 30.9 PG (ref 26–33)
MCHC RBC AUTO-ENTMCNC: 32.7 GM/DL (ref 32.2–35.5)
MCV RBC AUTO: 94.6 FL (ref 81–99)
MONOCYTES ABSOLUTE: 0.8 THOU/MM3 (ref 0.4–1.3)
MONOCYTES NFR BLD AUTO: 10.4 %
NEUTROPHILS NFR BLD AUTO: 59.3 %
NRBC BLD AUTO-RTO: 0 /100 WBC
PLATELET # BLD AUTO: 297 THOU/MM3 (ref 130–400)
PMV BLD AUTO: 9.4 FL (ref 9.4–12.4)
POTASSIUM SERPL-SCNC: 4.7 MEQ/L (ref 3.5–5.2)
RBC # BLD AUTO: 4.27 MILL/MM3 (ref 4.2–5.4)
SEGMENTED NEUTROPHILS ABSOLUTE COUNT: 4.4 THOU/MM3 (ref 1.8–7.7)
SODIUM SERPL-SCNC: 139 MEQ/L (ref 135–145)
WBC # BLD AUTO: 7.5 THOU/MM3 (ref 4.8–10.8)

## 2024-03-14 PROCEDURE — 1090F PRES/ABSN URINE INCON ASSESS: CPT | Performed by: OTOLARYNGOLOGY

## 2024-03-14 PROCEDURE — 3017F COLORECTAL CA SCREEN DOC REV: CPT | Performed by: OTOLARYNGOLOGY

## 2024-03-14 PROCEDURE — 36415 COLL VENOUS BLD VENIPUNCTURE: CPT

## 2024-03-14 PROCEDURE — 85025 COMPLETE CBC W/AUTO DIFF WBC: CPT

## 2024-03-14 PROCEDURE — G8427 DOCREV CUR MEDS BY ELIG CLIN: HCPCS | Performed by: OTOLARYNGOLOGY

## 2024-03-14 PROCEDURE — 80048 BASIC METABOLIC PNL TOTAL CA: CPT

## 2024-03-14 PROCEDURE — 1123F ACP DISCUSS/DSCN MKR DOCD: CPT | Performed by: OTOLARYNGOLOGY

## 2024-03-14 PROCEDURE — 1036F TOBACCO NON-USER: CPT | Performed by: OTOLARYNGOLOGY

## 2024-03-14 PROCEDURE — G8399 PT W/DXA RESULTS DOCUMENT: HCPCS | Performed by: OTOLARYNGOLOGY

## 2024-03-14 PROCEDURE — 99214 OFFICE O/P EST MOD 30 MIN: CPT | Performed by: OTOLARYNGOLOGY

## 2024-03-14 PROCEDURE — G8484 FLU IMMUNIZE NO ADMIN: HCPCS | Performed by: OTOLARYNGOLOGY

## 2024-03-14 PROCEDURE — G8417 CALC BMI ABV UP PARAM F/U: HCPCS | Performed by: OTOLARYNGOLOGY

## 2024-03-14 NOTE — TELEPHONE ENCOUNTER
Just FYI, patient had to r/s surgery from 4/1 to 6/5 due to current ear infection.  Previously, she had been instructed to hold her eliquis for 3 days. Assuming that will stay the same. Not sure if you wanted to see her again for pre op or change her post op.  Thanks so much!

## 2024-03-14 NOTE — TELEPHONE ENCOUNTER
Patients surgery with Dr Barrios was rescheduled from 04/01 to 06/05 due to current ear infection.  She was cleared 02/12/2024.  I assumed you would want to see her again.  She is scheduled for Right tympanoplasty.  I have given her orders to complete CBC, CMP.  EKG and CXR are current and in epic.  Eliquis will likely be held 3 days, but is TBD by hematology.  She says she is trying a new bp med, norvasc, so she is not taking the Lisinopril.  Normally we have Lisinopril held for 2 days, but the norvasc does not need held prior to surgery. She will also hold fish oil and vitamin c x 1 week.    Thank you!   endo RN

## 2024-03-17 RX ORDER — AMLODIPINE BESYLATE 10 MG/1
10 TABLET ORAL DAILY
Qty: 30 TABLET | Refills: 0 | Status: SHIPPED | OUTPATIENT
Start: 2024-03-17 | End: 2024-04-16

## 2024-03-20 ENCOUNTER — OFFICE VISIT (OUTPATIENT)
Dept: INTERNAL MEDICINE CLINIC | Age: 69
End: 2024-03-20
Payer: MEDICARE

## 2024-03-20 VITALS
WEIGHT: 197 LBS | TEMPERATURE: 98.1 F | SYSTOLIC BLOOD PRESSURE: 164 MMHG | RESPIRATION RATE: 18 BRPM | BODY MASS INDEX: 28.27 KG/M2 | HEART RATE: 68 BPM | DIASTOLIC BLOOD PRESSURE: 102 MMHG | OXYGEN SATURATION: 98 %

## 2024-03-20 DIAGNOSIS — I10 HYPERTENSION, UNSPECIFIED TYPE: Primary | ICD-10-CM

## 2024-03-20 DIAGNOSIS — F41.9 ANXIETY AND DEPRESSION: ICD-10-CM

## 2024-03-20 DIAGNOSIS — R79.89 ELEVATED SERUM CREATININE: ICD-10-CM

## 2024-03-20 DIAGNOSIS — Z86.718 HISTORY OF DVT OF LOWER EXTREMITY: ICD-10-CM

## 2024-03-20 DIAGNOSIS — F32.A ANXIETY AND DEPRESSION: ICD-10-CM

## 2024-03-20 PROCEDURE — G8399 PT W/DXA RESULTS DOCUMENT: HCPCS | Performed by: STUDENT IN AN ORGANIZED HEALTH CARE EDUCATION/TRAINING PROGRAM

## 2024-03-20 PROCEDURE — 3077F SYST BP >= 140 MM HG: CPT | Performed by: STUDENT IN AN ORGANIZED HEALTH CARE EDUCATION/TRAINING PROGRAM

## 2024-03-20 PROCEDURE — G8417 CALC BMI ABV UP PARAM F/U: HCPCS | Performed by: STUDENT IN AN ORGANIZED HEALTH CARE EDUCATION/TRAINING PROGRAM

## 2024-03-20 PROCEDURE — 99214 OFFICE O/P EST MOD 30 MIN: CPT | Performed by: STUDENT IN AN ORGANIZED HEALTH CARE EDUCATION/TRAINING PROGRAM

## 2024-03-20 PROCEDURE — 3017F COLORECTAL CA SCREEN DOC REV: CPT | Performed by: STUDENT IN AN ORGANIZED HEALTH CARE EDUCATION/TRAINING PROGRAM

## 2024-03-20 PROCEDURE — G8427 DOCREV CUR MEDS BY ELIG CLIN: HCPCS | Performed by: STUDENT IN AN ORGANIZED HEALTH CARE EDUCATION/TRAINING PROGRAM

## 2024-03-20 PROCEDURE — 1036F TOBACCO NON-USER: CPT | Performed by: STUDENT IN AN ORGANIZED HEALTH CARE EDUCATION/TRAINING PROGRAM

## 2024-03-20 PROCEDURE — 1090F PRES/ABSN URINE INCON ASSESS: CPT | Performed by: STUDENT IN AN ORGANIZED HEALTH CARE EDUCATION/TRAINING PROGRAM

## 2024-03-20 PROCEDURE — 1123F ACP DISCUSS/DSCN MKR DOCD: CPT | Performed by: STUDENT IN AN ORGANIZED HEALTH CARE EDUCATION/TRAINING PROGRAM

## 2024-03-20 PROCEDURE — G8484 FLU IMMUNIZE NO ADMIN: HCPCS | Performed by: STUDENT IN AN ORGANIZED HEALTH CARE EDUCATION/TRAINING PROGRAM

## 2024-03-20 PROCEDURE — 3080F DIAST BP >= 90 MM HG: CPT | Performed by: STUDENT IN AN ORGANIZED HEALTH CARE EDUCATION/TRAINING PROGRAM

## 2024-03-20 RX ORDER — AMLODIPINE BESYLATE 10 MG/1
10 TABLET ORAL DAILY
Qty: 90 TABLET | Refills: 1 | Status: SHIPPED | OUTPATIENT
Start: 2024-03-20

## 2024-03-20 RX ORDER — HYDROCHLOROTHIAZIDE 12.5 MG/1
25 CAPSULE, GELATIN COATED ORAL EVERY MORNING
Qty: 180 CAPSULE | Refills: 1 | Status: SHIPPED | OUTPATIENT
Start: 2024-03-20

## 2024-03-20 NOTE — PROGRESS NOTES
breath sounds.   Abdominal:      Palpations: Abdomen is soft.      Tenderness: There is no abdominal tenderness.   Musculoskeletal:         General: Normal range of motion.   Skin:     General: Skin is warm and dry.      Capillary Refill: Capillary refill takes less than 2 seconds.   Neurological:      General: No focal deficit present.      Mental Status: She is alert and oriented to person, place, and time.   Psychiatric:         Mood and Affect: Mood normal.         Behavior: Behavior normal.         Thought Content: Thought content normal.         Judgment: Judgment normal.            Case and plan developed in coordination with Dr. Sierra      An electronic signature was used to authenticate this note.    --Tde Silva DO, PGY3

## 2024-03-21 ENCOUNTER — OFFICE VISIT (OUTPATIENT)
Dept: ENT CLINIC | Age: 69
End: 2024-03-21
Payer: MEDICARE

## 2024-03-21 ENCOUNTER — NURSE ONLY (OUTPATIENT)
Dept: LAB | Age: 69
End: 2024-03-21

## 2024-03-21 VITALS
SYSTOLIC BLOOD PRESSURE: 136 MMHG | OXYGEN SATURATION: 99 % | HEART RATE: 67 BPM | HEIGHT: 70 IN | TEMPERATURE: 96.4 F | BODY MASS INDEX: 28.18 KG/M2 | RESPIRATION RATE: 18 BRPM | WEIGHT: 196.8 LBS | DIASTOLIC BLOOD PRESSURE: 72 MMHG

## 2024-03-21 DIAGNOSIS — D68.51 FACTOR V LEIDEN (HCC): ICD-10-CM

## 2024-03-21 DIAGNOSIS — N18.31 STAGE 3A CHRONIC KIDNEY DISEASE (HCC): ICD-10-CM

## 2024-03-21 DIAGNOSIS — I10 HYPERTENSION, UNSPECIFIED TYPE: ICD-10-CM

## 2024-03-21 DIAGNOSIS — R79.89 ELEVATED SERUM CREATININE: ICD-10-CM

## 2024-03-21 DIAGNOSIS — H72.91 PERFORATION OF RIGHT TYMPANIC MEMBRANE: Primary | ICD-10-CM

## 2024-03-21 DIAGNOSIS — Z86.718 HISTORY OF DVT (DEEP VEIN THROMBOSIS): ICD-10-CM

## 2024-03-21 LAB
ANION GAP SERPL CALC-SCNC: 12 MEQ/L (ref 8–16)
BUN SERPL-MCNC: 30 MG/DL (ref 7–22)
CALCIUM SERPL-MCNC: 9.4 MG/DL (ref 8.5–10.5)
CHLORIDE SERPL-SCNC: 101 MEQ/L (ref 98–111)
CO2 SERPL-SCNC: 25 MEQ/L (ref 23–33)
CREAT SERPL-MCNC: 1.3 MG/DL (ref 0.4–1.2)
GFR SERPL CREATININE-BSD FRML MDRD: 44 ML/MIN/1.73M2
GLUCOSE SERPL-MCNC: 87 MG/DL (ref 70–108)
POTASSIUM SERPL-SCNC: 4.6 MEQ/L (ref 3.5–5.2)
SODIUM SERPL-SCNC: 138 MEQ/L (ref 135–145)

## 2024-03-21 PROCEDURE — G8427 DOCREV CUR MEDS BY ELIG CLIN: HCPCS | Performed by: OTOLARYNGOLOGY

## 2024-03-21 PROCEDURE — G8399 PT W/DXA RESULTS DOCUMENT: HCPCS | Performed by: OTOLARYNGOLOGY

## 2024-03-21 PROCEDURE — 3017F COLORECTAL CA SCREEN DOC REV: CPT | Performed by: OTOLARYNGOLOGY

## 2024-03-21 PROCEDURE — 99213 OFFICE O/P EST LOW 20 MIN: CPT | Performed by: OTOLARYNGOLOGY

## 2024-03-21 PROCEDURE — G8417 CALC BMI ABV UP PARAM F/U: HCPCS | Performed by: OTOLARYNGOLOGY

## 2024-03-21 PROCEDURE — G8484 FLU IMMUNIZE NO ADMIN: HCPCS | Performed by: OTOLARYNGOLOGY

## 2024-03-21 PROCEDURE — 1036F TOBACCO NON-USER: CPT | Performed by: OTOLARYNGOLOGY

## 2024-03-21 PROCEDURE — 1123F ACP DISCUSS/DSCN MKR DOCD: CPT | Performed by: OTOLARYNGOLOGY

## 2024-03-21 PROCEDURE — 1090F PRES/ABSN URINE INCON ASSESS: CPT | Performed by: OTOLARYNGOLOGY

## 2024-03-21 RX ORDER — LOSARTAN POTASSIUM 100 MG/1
100 TABLET ORAL DAILY
Qty: 90 TABLET | Refills: 0 | OUTPATIENT
Start: 2024-03-21

## 2024-03-21 NOTE — PROGRESS NOTES
the lungs daily Lot# [id number]    Exp 4/2025 (Patient not taking: Reported on 3/14/2024) 30 capsule 0    Coenzyme Q10 (COQ10) 200 MG CAPS Take by mouth (Patient not taking: Reported on 2/13/2024)      Cholecalciferol (VITAMIN D3) 125 MCG (5000 UT) TABS Take 1 tablet by mouth daily (Patient not taking: Reported on 3/20/2024)      b complex vitamins capsule Take 1 capsule by mouth daily (Patient not taking: Reported on 3/20/2024)      Chromium-Cinnamon (CINNAMON PLUS CHROMIUM PO) Take 2 tablets by mouth daily (Patient not taking: Reported on 3/20/2024)      ZINC PO Take by mouth (Patient not taking: Reported on 2/13/2024)      Ascorbic Acid (VITAMIN C) 500 MG CAPS Take 500 mg by mouth daily (Patient not taking: Reported on 2/7/2024)       No current facility-administered medications for this visit.     Past Medical History:   Diagnosis Date    Bilateral hearing loss     COPD (chronic obstructive pulmonary disease) ([facility])     Factor V Leiden ([facility])     History of DVT (deep vein thrombosis)     multiple    History of pulmonary embolism     History of stasis dermatitis     Dr. Kathy Hui, given triamcinolone 40 mg IM, triamcinolone cream BID prn and hydroxyzine 1-2 tab at bedtime    Osteopenia     low FRAX score    Stage 3a chronic kidney disease (HCC)       Past Surgical History:   Procedure Laterality Date    CT BIOPSY RENAL  04/06/2022    CT BIOPSY RENAL 4/6/2022 [facility] CT SCAN    SHOULDER SURGERY Bilateral     rotator cuff     Family History   Problem Relation Age of Onset    Deep Vein Thrombosis Mother     Coronary Art Dis Father     GERD Father     Other Father 70        ESRD, hearing los    Other Sister 45        ESRD    GERD Sister     Pulmonary Embolism Sister     Hearing Loss Sister     Cancer Brother         lung, kidney    Hearing Loss Brother     Hearing Loss Brother      Social History     Tobacco Use    Smoking status: Never     Passive exposure: Never    Smokeless tobacco: Never   Substance Use

## 2024-03-22 NOTE — PROGRESS NOTES
Patient has had a significant ear infection in her right ear with the perforation draining mucopurulent otorrhea.  Drainage has stopped. The many gram negative bacilli observed on the direct gram stain were not recovered on the aerobic culture.  She has ciprofloxacin ophthalmic drops in the right ear and Sulfatrim DS.    .   Diagnosis Orders   1. Perforation of right tympanic membrane        2. Purulent otorrhea of right ear        3. Factor V Leiden (HCC)        4. History of DVT (deep vein thrombosis)        5. History of pulmonary embolism           She will continue the eardrops and finish the oral antibiotics.    
of Systems   Constitutional:  Negative for activity change, appetite change, chills, diaphoresis, fatigue, fever and unexpected weight change.   HENT:  Negative for congestion, dental problem, ear discharge, ear pain, facial swelling, hearing loss, mouth sores, nosebleeds, postnasal drip, rhinorrhea, sinus pressure, sneezing, sore throat, tinnitus, trouble swallowing and voice change.    Eyes:  Negative for visual disturbance.   Respiratory:  Negative for apnea, cough, choking, chest tightness, shortness of breath, wheezing and stridor.    Cardiovascular:  Negative for chest pain, palpitations and leg swelling.   Gastrointestinal:  Negative for abdominal pain, diarrhea, nausea and vomiting.   Endocrine: Negative for cold intolerance, heat intolerance, polydipsia and polyuria.   Genitourinary:  Negative for dysuria, enuresis and hematuria.   Musculoskeletal:  Negative for arthralgias, gait problem, neck pain and neck stiffness.   Skin:  Negative for color change and rash.   Allergic/Immunologic: Negative for environmental allergies, food allergies and immunocompromised state.   Neurological:  Negative for dizziness, syncope, facial asymmetry, speech difficulty, light-headedness and headaches.   Hematological:  Negative for adenopathy. Does not bruise/bleed easily.   Psychiatric/Behavioral:  Negative for confusion and sleep disturbance. The patient is not nervous/anxious.        Objective:   BP (!) 146/70 (Site: Left Upper Arm, Position: Sitting)   Pulse 96   Temp 96.9 °F (36.1 °C) (Infrared)   Resp 18   Ht 1.778 m (5' 10\")   Wt 90.2 kg (198 lb 14.4 oz)   SpO2 98%   BMI 28.54 kg/m²     Physical Exam    {FLxLx:97113}    Data:  All of the past medical history, past surgical history, family history,social history, allergies   and current medications were reviewed with the patient.     Assessment & Plan   Diagnoses and all orders for this visit:    {No diagnosis found. (Refresh or delete this SmartLink)}    The

## 2024-03-29 RX ORDER — TIOTROPIUM BROMIDE 18 UG/1
18 CAPSULE ORAL; RESPIRATORY (INHALATION) DAILY
Qty: 30 CAPSULE | Refills: 0 | Status: SHIPPED | OUTPATIENT
Start: 2024-03-29

## 2024-04-20 ENCOUNTER — HOSPITAL ENCOUNTER (EMERGENCY)
Age: 69
Discharge: HOME | End: 2024-04-20
Payer: MEDICARE

## 2024-04-20 VITALS
RESPIRATION RATE: 16 BRPM | DIASTOLIC BLOOD PRESSURE: 65 MMHG | WEIGHT: 180 LBS | BODY MASS INDEX: 25.77 KG/M2 | TEMPERATURE: 97.7 F | HEART RATE: 76 BPM | HEIGHT: 70 IN | OXYGEN SATURATION: 96 % | SYSTOLIC BLOOD PRESSURE: 125 MMHG

## 2024-04-20 DIAGNOSIS — M10.9 ACUTE GOUT INVOLVING TOE OF RIGHT FOOT, UNSPECIFIED CAUSE: Primary | ICD-10-CM

## 2024-04-20 PROCEDURE — 99213 OFFICE O/P EST LOW 20 MIN: CPT | Performed by: NURSE PRACTITIONER

## 2024-04-20 PROCEDURE — 99213 OFFICE O/P EST LOW 20 MIN: CPT

## 2024-04-20 RX ORDER — INDOMETHACIN 50 MG/1
50 CAPSULE ORAL 3 TIMES DAILY PRN
Qty: 9 CAPSULE | Refills: 0 | Status: SHIPPED | OUTPATIENT
Start: 2024-04-20 | End: 2024-04-24

## 2024-04-20 RX ORDER — PREDNISONE 10 MG/1
TABLET ORAL
Qty: 21 TABLET | Refills: 0 | Status: SHIPPED | OUTPATIENT
Start: 2024-04-20 | End: 2024-04-26

## 2024-04-20 ASSESSMENT — PAIN DESCRIPTION - ONSET: ONSET: GRADUAL

## 2024-04-20 ASSESSMENT — PAIN DESCRIPTION - LOCATION: LOCATION: TOE (COMMENT WHICH ONE)

## 2024-04-20 ASSESSMENT — PAIN DESCRIPTION - PAIN TYPE: TYPE: ACUTE PAIN

## 2024-04-20 ASSESSMENT — PAIN - FUNCTIONAL ASSESSMENT
PAIN_FUNCTIONAL_ASSESSMENT: 0-10
PAIN_FUNCTIONAL_ASSESSMENT: PREVENTS OR INTERFERES SOME ACTIVE ACTIVITIES AND ADLS

## 2024-04-20 ASSESSMENT — PAIN DESCRIPTION - ORIENTATION: ORIENTATION: LEFT

## 2024-04-20 ASSESSMENT — PAIN SCALES - GENERAL: PAINLEVEL_OUTOF10: 10

## 2024-04-20 ASSESSMENT — PAIN DESCRIPTION - DESCRIPTORS: DESCRIPTORS: BURNING;THROBBING

## 2024-04-20 ASSESSMENT — PAIN DESCRIPTION - FREQUENCY: FREQUENCY: CONTINUOUS

## 2024-04-20 NOTE — ED TRIAGE NOTES
Pt to [facility] ambulatory with  with left great toe pain.  Pain started on Thursday.  Left great toe is red and swollen.

## 2024-04-20 NOTE — ED PROVIDER NOTES
[facility] URGENT CARE  UrgentCare Encounter      CHIEFCOMPLAINT       Chief Complaint   Patient presents with    Toe Pain     Left great toe       Nurses Notes reviewed and I agree except as noted in the HPI.  HISTORY OF PRESENT ILLNESS   Carolynn Wynn is a 68 y.o. female who presents to urgent care with complaints of pain to the left large toe.  States that she has had gout in the past.  She has had no known injury.    REVIEW OF SYSTEMS     Review of Systems   Musculoskeletal:         Left great toe pain       PAST MEDICAL HISTORY         Diagnosis Date    Bilateral hearing loss     COPD (chronic obstructive pulmonary disease) ([facility])     Factor V Leiden ([facility])     History of DVT (deep vein thrombosis)     multiple    History of pulmonary embolism     History of stasis dermatitis     Dr. Kathy Hui, given triamcinolone 40 mg IM, triamcinolone cream BID prn and hydroxyzine 1-2 tab at bedtime    Osteopenia     low FRAX score    Stage 3a chronic kidney disease (HCC)        SURGICAL HISTORY     Patient  has a past surgical history that includes shoulder surgery (Bilateral) and CT BIOPSY RENAL (04/06/2022).    CURRENT MEDICATIONS       Discharge Medication List as of 4/20/2024  1:42 PM        CONTINUE these medications which have NOT CHANGED    Details   tiotropium (SPIRIVA HANDIHALER) 18 MCG inhalation capsule Inhale 1 capsule into the lungs daily Lot# [id number]    Exp 4/2025, Disp-30 capsule, R-0Normal      hydroCHLOROthiazide 12.5 MG capsule Take 2 capsules by mouth every morning, Disp-180 capsule, R-1Normal      apixaban (ELIQUIS) 5 MG TABS tablet Take 1 tablet by mouth 2 times daily, Disp-180 tablet, R-1Normal      amLODIPine (NORVASC) 10 MG tablet Take 1 tablet by mouth daily Do not take Losartan while using this medication, Disp-90 tablet, R-1Normal      sertraline (ZOLOFT) 50 MG tablet Take 1 tablet by mouth daily, Disp-90 tablet, R-1Normal      albuterol sulfate HFA (VENTOLIN HFA) 108

## 2024-04-24 ENCOUNTER — OFFICE VISIT (OUTPATIENT)
Dept: INTERNAL MEDICINE CLINIC | Age: 69
End: 2024-04-24
Payer: MEDICARE

## 2024-04-24 VITALS
BODY MASS INDEX: 28.66 KG/M2 | HEIGHT: 70 IN | SYSTOLIC BLOOD PRESSURE: 137 MMHG | DIASTOLIC BLOOD PRESSURE: 63 MMHG | OXYGEN SATURATION: 97 % | WEIGHT: 200.2 LBS | HEART RATE: 84 BPM

## 2024-04-24 DIAGNOSIS — M10.9 ACUTE GOUT INVOLVING TOE OF LEFT FOOT, UNSPECIFIED CAUSE: Primary | ICD-10-CM

## 2024-04-24 DIAGNOSIS — F41.9 ANXIETY AND DEPRESSION: ICD-10-CM

## 2024-04-24 DIAGNOSIS — F32.A ANXIETY AND DEPRESSION: ICD-10-CM

## 2024-04-24 DIAGNOSIS — I10 HYPERTENSION, UNSPECIFIED TYPE: ICD-10-CM

## 2024-04-24 PROCEDURE — G8427 DOCREV CUR MEDS BY ELIG CLIN: HCPCS | Performed by: STUDENT IN AN ORGANIZED HEALTH CARE EDUCATION/TRAINING PROGRAM

## 2024-04-24 PROCEDURE — G8399 PT W/DXA RESULTS DOCUMENT: HCPCS | Performed by: STUDENT IN AN ORGANIZED HEALTH CARE EDUCATION/TRAINING PROGRAM

## 2024-04-24 PROCEDURE — 1123F ACP DISCUSS/DSCN MKR DOCD: CPT | Performed by: STUDENT IN AN ORGANIZED HEALTH CARE EDUCATION/TRAINING PROGRAM

## 2024-04-24 PROCEDURE — 1036F TOBACCO NON-USER: CPT | Performed by: STUDENT IN AN ORGANIZED HEALTH CARE EDUCATION/TRAINING PROGRAM

## 2024-04-24 PROCEDURE — 1090F PRES/ABSN URINE INCON ASSESS: CPT | Performed by: STUDENT IN AN ORGANIZED HEALTH CARE EDUCATION/TRAINING PROGRAM

## 2024-04-24 PROCEDURE — 99214 OFFICE O/P EST MOD 30 MIN: CPT | Performed by: STUDENT IN AN ORGANIZED HEALTH CARE EDUCATION/TRAINING PROGRAM

## 2024-04-24 PROCEDURE — 3075F SYST BP GE 130 - 139MM HG: CPT | Performed by: STUDENT IN AN ORGANIZED HEALTH CARE EDUCATION/TRAINING PROGRAM

## 2024-04-24 PROCEDURE — 3017F COLORECTAL CA SCREEN DOC REV: CPT | Performed by: STUDENT IN AN ORGANIZED HEALTH CARE EDUCATION/TRAINING PROGRAM

## 2024-04-24 PROCEDURE — 3078F DIAST BP <80 MM HG: CPT | Performed by: STUDENT IN AN ORGANIZED HEALTH CARE EDUCATION/TRAINING PROGRAM

## 2024-04-24 PROCEDURE — G8417 CALC BMI ABV UP PARAM F/U: HCPCS | Performed by: STUDENT IN AN ORGANIZED HEALTH CARE EDUCATION/TRAINING PROGRAM

## 2024-04-24 RX ORDER — BUMETANIDE 0.5 MG/1
0.5 TABLET ORAL DAILY
Qty: 30 TABLET | Refills: 0 | Status: SHIPPED | OUTPATIENT
Start: 2024-04-24 | End: 2024-05-24

## 2024-04-24 RX ORDER — METHYLPREDNISOLONE 4 MG/1
TABLET ORAL
Qty: 1 KIT | Refills: 1 | Status: SHIPPED | OUTPATIENT
Start: 2024-04-24 | End: 2024-04-30

## 2024-04-24 RX ORDER — COLCHICINE 0.6 MG/1
0.6 TABLET ORAL 3 TIMES DAILY
Qty: 90 TABLET | Refills: 0 | Status: SHIPPED | OUTPATIENT
Start: 2024-04-24 | End: 2024-04-25 | Stop reason: DRUGHIGH

## 2024-04-24 NOTE — PATIENT INSTRUCTIONS
Continue Zoloft  Stop hydrochlorothiazide. Start Bumex 0.5mg daily  Start Colchicine 3x a day  New medrol dose ordered. Take as prescribed  Avoid GOUT triggering agents  Follow up in 1 month, with labs

## 2024-04-24 NOTE — PROGRESS NOTES
Carolynn Wynn (:  1955) is a 68 y.o. female,Established patient, here for evaluation of the following chief complaint(s):  Hypertension, Anxiety, Depression, and History of DVT      Assessment & Plan   1. Acute gout involving toe of left foot, unspecified cause  -     colchicine (COLCRYS) 0.6 MG tablet; Take 1 tablet by mouth in the morning, at noon, and at bedtime, Disp-90 tablet, R-0Normal  -     Uric Acid; Future  -     methylPREDNISolone (MEDROL DOSEPACK) 4 MG tablet; Take by mouth., Disp-1 kit, R-1Normal  -     Basic Metabolic Panel; Future  2. Hypertension, unspecified type  -     bumetanide (BUMEX) 0.5 MG tablet; Take 1 tablet by mouth daily, Disp-30 tablet, R-0Normal  -     Basic Metabolic Panel; Future  3. Anxiety and depression    Plan  Start Colchicine 3 times daily during acute Gout flare. Reduce to once daily following acute flare  Medrol dose pack ordered - take as rx'd  Stop HCTZ. Start Bumex 0.5mg daily. No ACE/ARB as previously noted.  Obtain lab work prior to next appointment  Continue current dose of Zoloft.  Follow-up in 4 weeks or sooner as needed if symptoms progress or fail to improve    Return in about 4 weeks (around 2024).       Subjective   HPI Carolynn Wynn is a 68 y.o. female who  has a past medical history of Bilateral hearing loss, COPD (chronic obstructive pulmonary disease) ([facility]), Factor V Leiden ([facility]), History of DVT (deep vein thrombosis), History of pulmonary embolism, History of stasis dermatitis, Osteopenia, and Stage 3a chronic kidney disease (HCC).     She is here today for a routine follow up. She went to  for GOUT flare up. She endorses previous hx of Gout with previous flares. She denies being on medications for gout in the past.  She was given medrol dose pack at  states her symptoms have not really improved much. She was started on HCTZ previously for htn.      At her last appointment she was started on Zoloft 50 mg daily.  She states she has

## 2024-04-25 RX ORDER — COLCHICINE 0.6 MG/1
TABLET ORAL
Qty: 90 TABLET | Refills: 0 | Status: SHIPPED | OUTPATIENT
Start: 2024-04-25

## 2024-04-25 ASSESSMENT — ENCOUNTER SYMPTOMS: EYES NEGATIVE: 1

## 2024-05-14 DIAGNOSIS — I10 HYPERTENSION, UNSPECIFIED TYPE: ICD-10-CM

## 2024-05-14 RX ORDER — BUMETANIDE 0.5 MG/1
0.5 TABLET ORAL DAILY
Qty: 30 TABLET | Refills: 12 | Status: SHIPPED | OUTPATIENT
Start: 2024-05-14

## 2024-05-15 ENCOUNTER — NURSE ONLY (OUTPATIENT)
Dept: LAB | Age: 69
End: 2024-05-15

## 2024-05-15 DIAGNOSIS — I10 HYPERTENSION, UNSPECIFIED TYPE: ICD-10-CM

## 2024-05-15 DIAGNOSIS — M10.9 ACUTE GOUT INVOLVING TOE OF LEFT FOOT, UNSPECIFIED CAUSE: ICD-10-CM

## 2024-05-15 LAB
ANION GAP SERPL CALC-SCNC: 10 MEQ/L (ref 8–16)
BUN SERPL-MCNC: 37 MG/DL (ref 7–22)
CALCIUM SERPL-MCNC: 9.5 MG/DL (ref 8.5–10.5)
CHLORIDE SERPL-SCNC: 104 MEQ/L (ref 98–111)
CO2 SERPL-SCNC: 25 MEQ/L (ref 23–33)
CREAT SERPL-MCNC: 1.6 MG/DL (ref 0.4–1.2)
GFR SERPL CREATININE-BSD FRML MDRD: 34 ML/MIN/1.73M2
GLUCOSE SERPL-MCNC: 91 MG/DL (ref 70–108)
POTASSIUM SERPL-SCNC: 4.4 MEQ/L (ref 3.5–5.2)
SODIUM SERPL-SCNC: 139 MEQ/L (ref 135–145)
URATE SERPL-MCNC: 9.2 MG/DL (ref 2.4–5.7)

## 2024-05-17 DIAGNOSIS — M10.9 ACUTE GOUT INVOLVING TOE OF LEFT FOOT, UNSPECIFIED CAUSE: ICD-10-CM

## 2024-05-22 ENCOUNTER — OFFICE VISIT (OUTPATIENT)
Dept: INTERNAL MEDICINE CLINIC | Age: 69
End: 2024-05-22
Payer: MEDICARE

## 2024-05-22 VITALS
HEART RATE: 78 BPM | WEIGHT: 193 LBS | OXYGEN SATURATION: 98 % | SYSTOLIC BLOOD PRESSURE: 138 MMHG | DIASTOLIC BLOOD PRESSURE: 76 MMHG | HEIGHT: 70 IN | BODY MASS INDEX: 27.63 KG/M2

## 2024-05-22 DIAGNOSIS — J44.9 CHRONIC OBSTRUCTIVE PULMONARY DISEASE, UNSPECIFIED COPD TYPE (HCC): ICD-10-CM

## 2024-05-22 DIAGNOSIS — I10 HYPERTENSION, UNSPECIFIED TYPE: ICD-10-CM

## 2024-05-22 DIAGNOSIS — M1A.9XX0 CHRONIC GOUT WITHOUT TOPHUS, UNSPECIFIED CAUSE, UNSPECIFIED SITE: Primary | ICD-10-CM

## 2024-05-22 DIAGNOSIS — R79.89 ELEVATED SERUM CREATININE: ICD-10-CM

## 2024-05-22 PROCEDURE — 3075F SYST BP GE 130 - 139MM HG: CPT | Performed by: STUDENT IN AN ORGANIZED HEALTH CARE EDUCATION/TRAINING PROGRAM

## 2024-05-22 PROCEDURE — 3078F DIAST BP <80 MM HG: CPT | Performed by: STUDENT IN AN ORGANIZED HEALTH CARE EDUCATION/TRAINING PROGRAM

## 2024-05-22 PROCEDURE — G8399 PT W/DXA RESULTS DOCUMENT: HCPCS | Performed by: STUDENT IN AN ORGANIZED HEALTH CARE EDUCATION/TRAINING PROGRAM

## 2024-05-22 PROCEDURE — 1090F PRES/ABSN URINE INCON ASSESS: CPT | Performed by: STUDENT IN AN ORGANIZED HEALTH CARE EDUCATION/TRAINING PROGRAM

## 2024-05-22 PROCEDURE — 3023F SPIROM DOC REV: CPT | Performed by: STUDENT IN AN ORGANIZED HEALTH CARE EDUCATION/TRAINING PROGRAM

## 2024-05-22 PROCEDURE — 99214 OFFICE O/P EST MOD 30 MIN: CPT | Performed by: STUDENT IN AN ORGANIZED HEALTH CARE EDUCATION/TRAINING PROGRAM

## 2024-05-22 PROCEDURE — 1036F TOBACCO NON-USER: CPT | Performed by: STUDENT IN AN ORGANIZED HEALTH CARE EDUCATION/TRAINING PROGRAM

## 2024-05-22 PROCEDURE — 3017F COLORECTAL CA SCREEN DOC REV: CPT | Performed by: STUDENT IN AN ORGANIZED HEALTH CARE EDUCATION/TRAINING PROGRAM

## 2024-05-22 PROCEDURE — 1123F ACP DISCUSS/DSCN MKR DOCD: CPT | Performed by: STUDENT IN AN ORGANIZED HEALTH CARE EDUCATION/TRAINING PROGRAM

## 2024-05-22 PROCEDURE — G8417 CALC BMI ABV UP PARAM F/U: HCPCS | Performed by: STUDENT IN AN ORGANIZED HEALTH CARE EDUCATION/TRAINING PROGRAM

## 2024-05-22 PROCEDURE — G8427 DOCREV CUR MEDS BY ELIG CLIN: HCPCS | Performed by: STUDENT IN AN ORGANIZED HEALTH CARE EDUCATION/TRAINING PROGRAM

## 2024-05-22 RX ORDER — AMLODIPINE BESYLATE 10 MG/1
10 TABLET ORAL DAILY
Qty: 90 TABLET | Refills: 1 | Status: SHIPPED | OUTPATIENT
Start: 2024-05-22 | End: 2024-11-18

## 2024-05-22 RX ORDER — TIOTROPIUM BROMIDE 18 UG/1
18 CAPSULE ORAL; RESPIRATORY (INHALATION) DAILY
Qty: 90 CAPSULE | Refills: 1 | Status: SHIPPED | OUTPATIENT
Start: 2024-05-22

## 2024-05-22 RX ORDER — SPIRONOLACTONE 25 MG/1
25 TABLET ORAL
Qty: 30 TABLET | Refills: 1 | Status: SHIPPED | OUTPATIENT
Start: 2024-05-22 | End: 2024-10-09

## 2024-05-22 RX ORDER — ALLOPURINOL 100 MG/1
100 TABLET ORAL DAILY
Qty: 90 TABLET | Refills: 1 | Status: SHIPPED | OUTPATIENT
Start: 2024-05-22

## 2024-05-22 RX ORDER — COLCHICINE 0.6 MG/1
TABLET ORAL
Qty: 90 TABLET | Refills: 0 | OUTPATIENT
Start: 2024-05-22

## 2024-05-22 RX ORDER — COLCHICINE 0.6 MG/1
0.6 TABLET ORAL DAILY
Qty: 90 TABLET | Refills: 1 | Status: SHIPPED | OUTPATIENT
Start: 2024-05-22 | End: 2024-11-18

## 2024-05-22 NOTE — PATIENT INSTRUCTIONS
Stop Bumex.   Start Spironolactone 25 mg three times a week (M,W,F)  Start Allopurinol 100mg daily  Continue Colchicine 0.6mg daily  F/u in 2 months    Day of surgery instructions:    Hold Amlodipine and Spironolactone day of surgery  Hold Eliquis 3 days prior to surgery

## 2024-05-22 NOTE — PROGRESS NOTES
Carolynn Wynn (:  1955) is a 68 y.o. female,Established patient, here for evaluation of the following chief complaint(s):  Anxiety, Depression, and Gout (Left foot)      Assessment & Plan   1. Chronic gout without tophus, unspecified cause, unspecified site  -     colchicine (COLCRYS) 0.6 MG tablet; Take 1 tablet by mouth daily, Disp-90 tablet, R-1Normal  -     allopurinol (ZYLOPRIM) 100 MG tablet; Take 1 tablet by mouth daily, Disp-90 tablet, R-1Normal  2. Hypertension, unspecified type  -     amLODIPine (NORVASC) 10 MG tablet; Take 1 tablet by mouth daily, Disp-90 tablet, R-1Normal  -     spironolactone (ALDACTONE) 25 MG tablet; Take 1 tablet by mouth three times a week, Disp-30 tablet, R-1Normal  3. Elevated serum creatinine  4. Chronic obstructive pulmonary disease, unspecified COPD type ([facility])  -     tiotropium (SPIRIVA HANDIHALER) 18 MCG inhalation capsule; Inhale 1 capsule into the lungs daily, Disp-90 capsule, R-1Normal    Plan:    Stop Bumex.   Start Spironolactone 25 mg three times a week (M,W,F)  Start Allopurinol 100mg daily  Continue Colchicine 0.6mg daily  Obtain labs as ordered for upcoming Nephrology visit  F/u in 2 months    Day of surgery instructions:    Hold Amlodipine and Spironolactone day of surgery  Hold Eliquis 3 days prior to surgery    Return in about 2 months (around 2024).       Subjective   HPI  Carolynn Wynn is a 68 y.o. female who  has a past medical history of Bilateral hearing loss, COPD (chronic obstructive pulmonary disease) ([facility]), Factor V Leiden ([facility]), History of DVT (deep vein thrombosis), History of pulmonary embolism, History of stasis dermatitis, Osteopenia, and Stage 3a chronic kidney disease ([facility]).     She is here today for an f/u from an acute gout attack. Today she is doing well. Her gout flare subsided with Colchicine TID, medrol dose pack and cessation of HCTZ. She has had gout flare in the past but was not on any preventative medications.  Uric

## 2024-05-29 ENCOUNTER — TELEPHONE (OUTPATIENT)
Dept: INTERNAL MEDICINE CLINIC | Age: 69
End: 2024-05-29

## 2024-05-29 ENCOUNTER — PREP FOR PROCEDURE (OUTPATIENT)
Dept: ENT CLINIC | Age: 69
End: 2024-05-29

## 2024-05-29 NOTE — PROGRESS NOTES
PAT Call Date:    Surgery Date: 6/5    Surgeon: Denis   Surgery: rt tube    Any Isolation Precautions? No      Type of Isolation Precaution: Not Applicable   Is patient from a nursing home? No  Name of Nursing Home:   Any equipment assist needed for moving patient? No   Type of Equipment: Not Applicable  Patient last weight: 199 lb  RESCHEDULED X2 DUE TO EAR INFECTIONS   Hard Copy on Chart  In EPIC Pending/Notes   Consent -   Within 30 days; signed, dated & timed by patient and physician     [] On Arrival     [] Blood      [] DNR   H&P -   Within 30 days    [] Physician To Do     Clearance -    5/28  [x]Medical Shira-cleared      []Cardiac     [] Pulmonary    Orders -   Signed and Dated      [] Physician To Do   K+w/Mg   Labs -   Within 3 months   3/14  5/15  [x] CBC -ok   [x] BMP bun 30cr 1.3   [x] GFR 34   [] INR    [] PTT    [] Urine    [] Liver Enzymes    [] MRSA Nasal      Others:     Radiology Studies -   Within 1 year  1/2  [x] Chest X-Ray-ok   [] MRI    [] CT    [] Vascular   [] US     Pulmonary -     [] HILDA   [] CPAP     Cardiac Workup -   Stress Test, Echo, Cath within 18 months  1/2 2/28        [x] EKG -ok     [] Cath                 [] Stress Test                      [x] Echo/MARSHALL 55-60%   [] CABG   [] Holter Monitor      [] Pacemaker/ICD        Brand:        Where does patient have checked:         Last check:         Rep Notified:

## 2024-05-30 NOTE — TELEPHONE ENCOUNTER
Pre Admission Testing is questioning the note for the patient that states hold norvasc and diuretic.. is this correct? That is not the normal direction for surgery and we do need to clarify if this is correct. Please advise.  Patient is scheduled for surgery on 06/05/2024.

## 2024-05-30 NOTE — PROGRESS NOTES
Mellissa checking with clearance dr office to have progress note state cleared and to check on the instructions related to medications to hold or take pre operatively    05/31/24 8:25 pt cleared for surgery. To hold spironolactone am of surgery but to take norvasc am of surgery.  Patient called and so instructed

## 2024-05-30 NOTE — PROGRESS NOTES
Follow all instructions given by your physician  Do not eat or drink anything after midnight prior to surgery(includes water, chewing gum, mints and ice chips)  Sips of water am of surgery with allowed medications  May brush teeth   Do not smoke or chew tobacco, drink alcoholic beverages or use any illicit drugs for 24 hours prior to surgery  Bring insurance info and photo ID  Bring pertinent paperwork with you from Doctor or surgeons's office  Wear clean comfortable, loose-fitting clothing  No make-up, nail polish, jewelry, piercings, or contact lenses to be worn day of surgery  No glue on dentures morning of surgery; you will be asked to remove them for surgery. Case for glasses.  Shower the night before and the morning of surgery with cleansing soap provided or a liquid antibacterial soap, dry with new fresh clean towel after each shower, no lotions, creams or powder.  Clean sheets and pillowcase on bed night before surgery  Bring medications in original bottles, Bring rescue inhalers with you  Bring CPAP/BIPAP machine if you have one ( you may be charged if one is needed in recovery room )    Do you have a DNR?   Please Bring Healthcare Directive or Healthcare Power of  in so we can scan it into your chart.    Our pharmacy has a Meds to Beds program where they will deliver any new prescriptions you may have to your room before you leave. Our Pharmacy will clear it through your insurance; for example (same co pay). This enables you to take your new RX as soon as you need when you get home and avoids stop/wait delays on the way home.  Please have a form of payment with you and have someone designated as your Pharmacy contact with their phone # as you may not feel well or still be under the influence of anesthesia.    Please refer to the SSI-Surgical Site Infection Flyer you hopefully received in the mail-together we can prevent infections; signs and symptoms reviewed.  When discharged be sure you

## 2024-06-05 ENCOUNTER — HOSPITAL ENCOUNTER (OUTPATIENT)
Age: 69
Setting detail: OUTPATIENT SURGERY
Discharge: HOME | End: 2024-06-05
Attending: OTOLARYNGOLOGY | Admitting: OTOLARYNGOLOGY
Payer: MEDICARE

## 2024-06-05 ENCOUNTER — ANESTHESIA EVENT (OUTPATIENT)
Dept: OPERATING ROOM | Age: 69
End: 2024-06-05
Payer: MEDICARE

## 2024-06-05 ENCOUNTER — ANESTHESIA (OUTPATIENT)
Dept: OPERATING ROOM | Age: 69
End: 2024-06-05
Payer: MEDICARE

## 2024-06-05 VITALS
RESPIRATION RATE: 16 BRPM | OXYGEN SATURATION: 92 % | SYSTOLIC BLOOD PRESSURE: 109 MMHG | HEART RATE: 75 BPM | TEMPERATURE: 97.2 F | BODY MASS INDEX: 26.92 KG/M2 | HEIGHT: 71 IN | DIASTOLIC BLOOD PRESSURE: 63 MMHG | WEIGHT: 192.31 LBS

## 2024-06-05 DIAGNOSIS — H72.91 PERFORATION OF RIGHT TYMPANIC MEMBRANE: Primary | ICD-10-CM

## 2024-06-05 DIAGNOSIS — Z98.890 STATUS POST TYMPANOPLASTY: ICD-10-CM

## 2024-06-05 PROBLEM — H90.A31 MIXED CONDUCTIVE AND SENSORINEURAL HEARING LOSS OF RIGHT EAR WITH RESTRICTED HEARING OF LEFT EAR: Status: ACTIVE | Noted: 2024-06-05

## 2024-06-05 LAB — POTASSIUM SERPL-SCNC: 5 MEQ/L (ref 3.5–5.2)

## 2024-06-05 PROCEDURE — 6360000002 HC RX W HCPCS: Performed by: NURSE ANESTHETIST, CERTIFIED REGISTERED

## 2024-06-05 PROCEDURE — 84132 ASSAY OF SERUM POTASSIUM: CPT

## 2024-06-05 PROCEDURE — 2500000003 HC RX 250 WO HCPCS: Performed by: OTOLARYNGOLOGY

## 2024-06-05 PROCEDURE — 69631 REPAIR EARDRUM STRUCTURES: CPT | Performed by: OTOLARYNGOLOGY

## 2024-06-05 PROCEDURE — 7100000010 HC PHASE II RECOVERY - FIRST 15 MIN: Performed by: OTOLARYNGOLOGY

## 2024-06-05 PROCEDURE — 6360000002 HC RX W HCPCS: Performed by: OTOLARYNGOLOGY

## 2024-06-05 PROCEDURE — 3700000000 HC ANESTHESIA ATTENDED CARE: Performed by: OTOLARYNGOLOGY

## 2024-06-05 PROCEDURE — 7100000001 HC PACU RECOVERY - ADDTL 15 MIN: Performed by: OTOLARYNGOLOGY

## 2024-06-05 PROCEDURE — 2580000003 HC RX 258: Performed by: OTOLARYNGOLOGY

## 2024-06-05 PROCEDURE — 7100000000 HC PACU RECOVERY - FIRST 15 MIN: Performed by: OTOLARYNGOLOGY

## 2024-06-05 PROCEDURE — 2500000003 HC RX 250 WO HCPCS: Performed by: NURSE ANESTHETIST, CERTIFIED REGISTERED

## 2024-06-05 PROCEDURE — 36415 COLL VENOUS BLD VENIPUNCTURE: CPT

## 2024-06-05 PROCEDURE — 2709999900 HC NON-CHARGEABLE SUPPLY: Performed by: OTOLARYNGOLOGY

## 2024-06-05 PROCEDURE — 3600000004 HC SURGERY LEVEL 4 BASE: Performed by: OTOLARYNGOLOGY

## 2024-06-05 PROCEDURE — 6370000000 HC RX 637 (ALT 250 FOR IP): Performed by: OTOLARYNGOLOGY

## 2024-06-05 PROCEDURE — 6370000000 HC RX 637 (ALT 250 FOR IP): Performed by: ANESTHESIOLOGY

## 2024-06-05 PROCEDURE — 3600000014 HC SURGERY LEVEL 4 ADDTL 15MIN: Performed by: OTOLARYNGOLOGY

## 2024-06-05 PROCEDURE — 3700000001 HC ADD 15 MINUTES (ANESTHESIA): Performed by: OTOLARYNGOLOGY

## 2024-06-05 PROCEDURE — 7100000011 HC PHASE II RECOVERY - ADDTL 15 MIN: Performed by: OTOLARYNGOLOGY

## 2024-06-05 RX ORDER — LABETALOL HYDROCHLORIDE 5 MG/ML
10 INJECTION INTRAVENOUS
Status: DISCONTINUED | OUTPATIENT
Start: 2024-06-05 | End: 2024-06-05 | Stop reason: HOSPADM

## 2024-06-05 RX ORDER — LIDOCAINE HCL/PF 100 MG/5ML
SYRINGE (ML) INJECTION PRN
Status: DISCONTINUED | OUTPATIENT
Start: 2024-06-05 | End: 2024-06-05 | Stop reason: SDUPTHER

## 2024-06-05 RX ORDER — HYDROCODONE BITARTRATE AND ACETAMINOPHEN 7.5; 325 MG/1; MG/1
1 TABLET ORAL EVERY 6 HOURS PRN
Qty: 20 TABLET | Refills: 0 | Status: SHIPPED | OUTPATIENT
Start: 2024-06-05 | End: 2024-06-10

## 2024-06-05 RX ORDER — SODIUM CHLORIDE 0.9 % (FLUSH) 0.9 %
5-40 SYRINGE (ML) INJECTION PRN
Status: DISCONTINUED | OUTPATIENT
Start: 2024-06-05 | End: 2024-06-05 | Stop reason: HOSPADM

## 2024-06-05 RX ORDER — SODIUM CHLORIDE 0.9 % (FLUSH) 0.9 %
5-40 SYRINGE (ML) INJECTION PRN
Status: CANCELLED | OUTPATIENT
Start: 2024-06-05

## 2024-06-05 RX ORDER — GLUCAGON 1 MG/ML
1 KIT INJECTION PRN
Status: DISCONTINUED | OUTPATIENT
Start: 2024-06-05 | End: 2024-06-05 | Stop reason: HOSPADM

## 2024-06-05 RX ORDER — EPINEPHRINE 1 MG/ML
INJECTION, SOLUTION, CONCENTRATE INTRAVENOUS PRN
Status: DISCONTINUED | OUTPATIENT
Start: 2024-06-05 | End: 2024-06-05 | Stop reason: HOSPADM

## 2024-06-05 RX ORDER — FENTANYL CITRATE 50 UG/ML
50 INJECTION, SOLUTION INTRAMUSCULAR; INTRAVENOUS EVERY 5 MIN PRN
Status: DISCONTINUED | OUTPATIENT
Start: 2024-06-05 | End: 2024-06-05 | Stop reason: HOSPADM

## 2024-06-05 RX ORDER — SODIUM CHLORIDE 9 MG/ML
INJECTION, SOLUTION INTRAVENOUS PRN
Status: DISCONTINUED | OUTPATIENT
Start: 2024-06-05 | End: 2024-06-05 | Stop reason: HOSPADM

## 2024-06-05 RX ORDER — ACETAMINOPHEN 325 MG/1
650 TABLET ORAL ONCE
Status: DISCONTINUED | OUTPATIENT
Start: 2024-06-05 | End: 2024-06-05 | Stop reason: HOSPADM

## 2024-06-05 RX ORDER — PHENYLEPHRINE HCL IN 0.9% NACL 1 MG/10 ML
SYRINGE (ML) INTRAVENOUS PRN
Status: DISCONTINUED | OUTPATIENT
Start: 2024-06-05 | End: 2024-06-05 | Stop reason: SDUPTHER

## 2024-06-05 RX ORDER — ONDANSETRON 2 MG/ML
INJECTION INTRAMUSCULAR; INTRAVENOUS PRN
Status: DISCONTINUED | OUTPATIENT
Start: 2024-06-05 | End: 2024-06-05 | Stop reason: SDUPTHER

## 2024-06-05 RX ORDER — SODIUM CHLORIDE 0.9 % (FLUSH) 0.9 %
5-40 SYRINGE (ML) INJECTION EVERY 12 HOURS SCHEDULED
Status: CANCELLED | OUTPATIENT
Start: 2024-06-05

## 2024-06-05 RX ORDER — EPHEDRINE SULFATE/0.9% NACL/PF 25 MG/5 ML
SYRINGE (ML) INTRAVENOUS PRN
Status: DISCONTINUED | OUTPATIENT
Start: 2024-06-05 | End: 2024-06-05 | Stop reason: SDUPTHER

## 2024-06-05 RX ORDER — ROCURONIUM BROMIDE 10 MG/ML
INJECTION, SOLUTION INTRAVENOUS PRN
Status: DISCONTINUED | OUTPATIENT
Start: 2024-06-05 | End: 2024-06-05 | Stop reason: SDUPTHER

## 2024-06-05 RX ORDER — IPRATROPIUM BROMIDE AND ALBUTEROL SULFATE 2.5; .5 MG/3ML; MG/3ML
1 SOLUTION RESPIRATORY (INHALATION)
Status: DISCONTINUED | OUTPATIENT
Start: 2024-06-05 | End: 2024-06-05 | Stop reason: HOSPADM

## 2024-06-05 RX ORDER — DROPERIDOL 2.5 MG/ML
0.62 INJECTION, SOLUTION INTRAMUSCULAR; INTRAVENOUS
Status: DISCONTINUED | OUTPATIENT
Start: 2024-06-05 | End: 2024-06-05 | Stop reason: HOSPADM

## 2024-06-05 RX ORDER — ONDANSETRON 2 MG/ML
4 INJECTION INTRAMUSCULAR; INTRAVENOUS
Status: DISCONTINUED | OUTPATIENT
Start: 2024-06-05 | End: 2024-06-05 | Stop reason: HOSPADM

## 2024-06-05 RX ORDER — DEXAMETHASONE SODIUM PHOSPHATE 10 MG/ML
INJECTION, EMULSION INTRAMUSCULAR; INTRAVENOUS PRN
Status: DISCONTINUED | OUTPATIENT
Start: 2024-06-05 | End: 2024-06-05 | Stop reason: SDUPTHER

## 2024-06-05 RX ORDER — SODIUM CHLORIDE 0.9 % (FLUSH) 0.9 %
5-40 SYRINGE (ML) INJECTION EVERY 12 HOURS SCHEDULED
Status: DISCONTINUED | OUTPATIENT
Start: 2024-06-05 | End: 2024-06-05 | Stop reason: HOSPADM

## 2024-06-05 RX ORDER — PROPOFOL 10 MG/ML
INJECTION, EMULSION INTRAVENOUS PRN
Status: DISCONTINUED | OUTPATIENT
Start: 2024-06-05 | End: 2024-06-05 | Stop reason: SDUPTHER

## 2024-06-05 RX ORDER — OXYCODONE HYDROCHLORIDE 5 MG/1
5 TABLET ORAL
Status: COMPLETED | OUTPATIENT
Start: 2024-06-05 | End: 2024-06-05

## 2024-06-05 RX ORDER — DEXTROSE MONOHYDRATE 100 MG/ML
INJECTION, SOLUTION INTRAVENOUS CONTINUOUS PRN
Status: DISCONTINUED | OUTPATIENT
Start: 2024-06-05 | End: 2024-06-05 | Stop reason: HOSPADM

## 2024-06-05 RX ORDER — NALOXONE HYDROCHLORIDE 0.4 MG/ML
INJECTION, SOLUTION INTRAMUSCULAR; INTRAVENOUS; SUBCUTANEOUS PRN
Status: DISCONTINUED | OUTPATIENT
Start: 2024-06-05 | End: 2024-06-05 | Stop reason: HOSPADM

## 2024-06-05 RX ORDER — LIDOCAINE HYDROCHLORIDE AND EPINEPHRINE 10; 10 MG/ML; UG/ML
INJECTION, SOLUTION INFILTRATION; PERINEURAL PRN
Status: DISCONTINUED | OUTPATIENT
Start: 2024-06-05 | End: 2024-06-05 | Stop reason: HOSPADM

## 2024-06-05 RX ORDER — SCOLOPAMINE TRANSDERMAL SYSTEM 1 MG/1
1 PATCH, EXTENDED RELEASE TRANSDERMAL
Status: DISCONTINUED | OUTPATIENT
Start: 2024-06-05 | End: 2024-06-05 | Stop reason: HOSPADM

## 2024-06-05 RX ORDER — GINSENG 100 MG
CAPSULE ORAL PRN
Status: DISCONTINUED | OUTPATIENT
Start: 2024-06-05 | End: 2024-06-05 | Stop reason: HOSPADM

## 2024-06-05 RX ORDER — CIPROFLOXACIN HYDROCHLORIDE 3.5 MG/ML
SOLUTION/ DROPS TOPICAL PRN
Status: DISCONTINUED | OUTPATIENT
Start: 2024-06-05 | End: 2024-06-05 | Stop reason: HOSPADM

## 2024-06-05 RX ORDER — FENTANYL CITRATE 50 UG/ML
INJECTION, SOLUTION INTRAMUSCULAR; INTRAVENOUS PRN
Status: DISCONTINUED | OUTPATIENT
Start: 2024-06-05 | End: 2024-06-05 | Stop reason: SDUPTHER

## 2024-06-05 RX ORDER — DIPHENHYDRAMINE HYDROCHLORIDE 50 MG/ML
12.5 INJECTION INTRAMUSCULAR; INTRAVENOUS
Status: DISCONTINUED | OUTPATIENT
Start: 2024-06-05 | End: 2024-06-05 | Stop reason: HOSPADM

## 2024-06-05 RX ORDER — HYDRALAZINE HYDROCHLORIDE 20 MG/ML
10 INJECTION INTRAMUSCULAR; INTRAVENOUS
Status: DISCONTINUED | OUTPATIENT
Start: 2024-06-05 | End: 2024-06-05 | Stop reason: HOSPADM

## 2024-06-05 RX ORDER — SODIUM CHLORIDE 9 MG/ML
INJECTION, SOLUTION INTRAVENOUS PRN
Status: CANCELLED | OUTPATIENT
Start: 2024-06-05

## 2024-06-05 RX ADMIN — OXYCODONE 5 MG: 5 TABLET ORAL at 14:15

## 2024-06-05 RX ADMIN — SODIUM CHLORIDE: 9 INJECTION, SOLUTION INTRAVENOUS at 08:25

## 2024-06-05 RX ADMIN — PROPOFOL 150 MG: 10 INJECTION, EMULSION INTRAVENOUS at 09:37

## 2024-06-05 RX ADMIN — ROCURONIUM BROMIDE 50 MG: 10 INJECTION INTRAVENOUS at 09:37

## 2024-06-05 RX ADMIN — ROCURONIUM BROMIDE 10 MG: 10 INJECTION INTRAVENOUS at 11:40

## 2024-06-05 RX ADMIN — EPHEDRINE SULFATE 5 MG: 5 INJECTION INTRAVENOUS at 11:19

## 2024-06-05 RX ADMIN — Medication 200 MCG: at 11:19

## 2024-06-05 RX ADMIN — DEXAMETHASONE SODIUM PHOSPHATE 4 MG: 10 INJECTION, EMULSION INTRAMUSCULAR; INTRAVENOUS at 09:56

## 2024-06-05 RX ADMIN — EPHEDRINE SULFATE 10 MG: 5 INJECTION INTRAVENOUS at 10:04

## 2024-06-05 RX ADMIN — FENTANYL CITRATE 50 MCG: 50 INJECTION, SOLUTION INTRAMUSCULAR; INTRAVENOUS at 09:30

## 2024-06-05 RX ADMIN — Medication 100 MG: at 09:37

## 2024-06-05 RX ADMIN — SUGAMMADEX 200 MG: 100 INJECTION, SOLUTION INTRAVENOUS at 12:17

## 2024-06-05 RX ADMIN — Medication 200 MCG: at 09:52

## 2024-06-05 RX ADMIN — Medication 100 MCG: at 10:14

## 2024-06-05 RX ADMIN — SODIUM CHLORIDE: 9 INJECTION, SOLUTION INTRAVENOUS at 11:26

## 2024-06-05 RX ADMIN — ROCURONIUM BROMIDE 20 MG: 10 INJECTION INTRAVENOUS at 10:24

## 2024-06-05 RX ADMIN — ROCURONIUM BROMIDE 20 MG: 10 INJECTION INTRAVENOUS at 11:07

## 2024-06-05 RX ADMIN — ONDANSETRON 4 MG: 2 INJECTION INTRAMUSCULAR; INTRAVENOUS at 11:57

## 2024-06-05 RX ADMIN — FENTANYL CITRATE 50 MCG: 50 INJECTION, SOLUTION INTRAMUSCULAR; INTRAVENOUS at 10:24

## 2024-06-05 RX ADMIN — Medication 200 MCG: at 10:00

## 2024-06-05 RX ADMIN — FENTANYL CITRATE 50 MCG: 50 INJECTION, SOLUTION INTRAMUSCULAR; INTRAVENOUS at 11:42

## 2024-06-05 RX ADMIN — EPHEDRINE SULFATE 10 MG: 5 INJECTION INTRAVENOUS at 10:18

## 2024-06-05 ASSESSMENT — ENCOUNTER SYMPTOMS
CHEST TIGHTNESS: 0
WHEEZING: 0
COLOR CHANGE: 0
CHOKING: 0
VOMITING: 0
SHORTNESS OF BREATH: 0
STRIDOR: 0
ABDOMINAL PAIN: 0
FACIAL SWELLING: 0
NAUSEA: 0
APNEA: 0
COUGH: 0
DIARRHEA: 0
VOICE CHANGE: 0
SINUS PRESSURE: 0
TROUBLE SWALLOWING: 0
RHINORRHEA: 0
SHORTNESS OF BREATH: 1
SORE THROAT: 0

## 2024-06-05 ASSESSMENT — PAIN - FUNCTIONAL ASSESSMENT
PAIN_FUNCTIONAL_ASSESSMENT: 0-10
PAIN_FUNCTIONAL_ASSESSMENT: NONE - DENIES PAIN

## 2024-06-05 ASSESSMENT — PAIN SCALES - GENERAL
PAINLEVEL_OUTOF10: 7
PAINLEVEL_OUTOF10: 0

## 2024-06-05 ASSESSMENT — PAIN DESCRIPTION - ORIENTATION: ORIENTATION: RIGHT;INNER

## 2024-06-05 ASSESSMENT — PAIN DESCRIPTION - DESCRIPTORS
DESCRIPTORS: SHARP
DESCRIPTORS: SHARP

## 2024-06-05 ASSESSMENT — PAIN DESCRIPTION - LOCATION: LOCATION: EAR;THROAT

## 2024-06-05 ASSESSMENT — COPD QUESTIONNAIRES: CAT_SEVERITY: MODERATE

## 2024-06-05 NOTE — ANESTHESIA PRE PROCEDURE
Department of Anesthesiology  Preprocedure Note       Name:  Carolynn Wynn   Age:  68 y.o.  :  1955                                          MRN:  [id number]         Date:  2024      Surgeon: Surgeon(s):  Patrick Barrios MD    Procedure: Procedure(s):  Right Tympanoplasty    Medications prior to admission:   Prior to Admission medications    Medication Sig Start Date End Date Taking? Authorizing Provider   amLODIPine (NORVASC) 10 MG tablet Take 1 tablet by mouth daily 24  Sher Sierra DO   colchicine (COLCRYS) 0.6 MG tablet Take 1 tablet by mouth daily  Patient not taking: Reported on 24  Sher Sierra DO   allopurinol (ZYLOPRIM) 100 MG tablet Take 1 tablet by mouth daily 24   Sher Sierra DO   spironolactone (ALDACTONE) 25 MG tablet Take 1 tablet by mouth three times a week 5/22/24 10/9/24  Sher Sierra DO   tiotropium (SPIRIVA HANDIHALER) 18 MCG inhalation capsule Inhale 1 capsule into the lungs daily 24   Sher Sierra DO   apixaban (ELIQUIS) 5 MG TABS tablet Take 1 tablet by mouth 2 times daily 3/20/24   Ted Silva DO   sertraline (ZOLOFT) 50 MG tablet Take 1 tablet by mouth daily 3/20/24   Ted Silva DO   albuterol sulfate HFA (VENTOLIN HFA) 108 (90 Base) MCG/ACT inhaler Inhale 2 puffs into the lungs 4 times daily as needed for Wheezing 24   Ted Silva DO       Current medications:    Current Facility-Administered Medications   Medication Dose Route Frequency Provider Last Rate Last Admin   • sodium chloride flush 0.9 % injection 5-40 mL  5-40 mL IntraVENous 2 times per day Patrick Barrios MD       • sodium chloride flush 0.9 % injection 5-40 mL  5-40 mL IntraVENous PRN Patrick Barrios MD       • 0.9 % sodium chloride infusion   IntraVENous PRN Patrick Barrios MD       • scopolamine (TRANSDERM-SCOP) transdermal patch 1 patch  1 patch TransDERmal Q72H Pasion, Satinder, DO           Allergies:

## 2024-06-05 NOTE — PROGRESS NOTES
Patient oriented to Same Day department and admitted to Same Day Surgery room 12.   Patient verbalized approval for first name, last initial with physician name on unit whiteboard.     Plan of care reviewed with patient.   Patient room whiteboard filled out and discussed with patient and responsible adult.     Call light in reach.   Bed in lowest position, locked, with one bed rail up.   SCDs and warming blanket in place.  Appropriate arm bands on patient.   Bathroom offered.   All questions and concerns of patient addressed.        Meds to Beds:   Patient informed of [facility] Meds to Beds program during admission. Patient has declined use of program.

## 2024-06-05 NOTE — PROGRESS NOTES
1226: pt arrives to pacu. Pt on 4L nasal cannula. VSS. Respirations unlabored. Pt denies pain. Ana wrapped around head   1232: pt on 2L nasal cannula   1240: pt resting in bed. Pt denies pain and nausea   1256: pt meets discharge criteria from pacu. Pt transported to [facility]

## 2024-06-05 NOTE — INTERVAL H&P NOTE
Pt Name: Carolynn Wynn  MRN: [id number]  YOB: 1955  Date of evaluation: 6/5/2024    I have examined the patient and reviewed the H&P/Consult and there are no changes to the patient or plans. Anterior inferior clean dry perforation.         Electronically signed by CHIDI SÁNCHEZ MD on 6/5/2024 at 9:02 AM

## 2024-06-05 NOTE — H&P
[facility]  [facility] EAR, NOSE AND THROAT  [address]  [address]  [facility] [address]  Dept: [phone]  Dept Fax: [phone]  Loc: [phone]    Carolynn Wynn is a 68 y.o. female who was referred by No ref. provider found for:  Chief Complaint   Patient presents with    Ear Problem     Follow up right ear.  Patient reports that her ear is feeling fine.  She said her hearing in that ear is still bad.   .    HPI:     Carolynn Wynn is a 68 y.o. female who presents today for follow-up on her otitis media with perforation that deferred her surgery.  She says that the ear is feeling better and she is using the antibiotic drops.  She has not had any drainage lately.    Culture of the drainage should many gram-negative rods that did not grow on culture.  This was likely Pseudomonas, statistically.    History:     Allergies   Allergen Reactions    Other Other (See Comments)     Cotton wood    Penicillins      Current Outpatient Medications   Medication Sig Dispense Refill    hydroCHLOROthiazide 12.5 MG capsule Take 2 capsules by mouth every morning 180 capsule 1    apixaban (ELIQUIS) 5 MG TABS tablet Take 1 tablet by mouth 2 times daily 180 tablet 1    amLODIPine (NORVASC) 10 MG tablet Take 1 tablet by mouth daily Do not take Losartan while using this medication 90 tablet 1    sertraline (ZOLOFT) 50 MG tablet Take 1 tablet by mouth daily 90 tablet 1    albuterol sulfate HFA (VENTOLIN HFA) 108 (90 Base) MCG/ACT inhaler Inhale 2 puffs into the lungs 4 times daily as needed for Wheezing 18 g 0    triamcinolone (KENALOG) 0.1 % cream       ciprofloxacin (CILOXAN) 0.3 % ophthalmic solution Place 4 drops in ear(s) 3 times daily Right ear, keep instilled ear up for 10 min.  Warm to body temperature before use by placing in pocket 30 minutes (Patient not taking: Reported on 3/20/2024) 5 mL 3    tiotropium (SPIRIVA HANDIHALER) 18 MCG inhalation capsule Inhale 1 capsule into

## 2024-06-05 NOTE — ANESTHESIA POSTPROCEDURE EVALUATION
Department of Anesthesiology  Postprocedure Note    Patient: Carolynn Wynn  MRN: [id number]  YOB: 1955  Date of evaluation: 6/5/2024    Procedure Summary       Date: 06/05/24 [facility] / [facility]    Anesthesia Start: 0932 Anesthesia Stop: 1231    Procedure: Right Tympanoplasty (Right: Ear) Diagnosis:       Perforation of right tympanic membrane      Mixed conductive and sensorineural hearing loss of right ear with restricted hearing of left ear      Factor V Leiden (HCC)      (Perforation of right tympanic membrane [H72.91])      (Mixed conductive and sensorineural hearing loss of right ear with restricted hearing of left ear [H90.A31])      (Factor V Leiden (HCC) [D68.51])    Surgeons: Patrick Barrios MD Responsible Provider: Satinder Carballo DO    Anesthesia Type: general ASA Status: 2            Anesthesia Type: No value filed.    Jessica Phase I: Jessica Score: 8    Jessica Phase II:      Anesthesia Post Evaluation    Patient location during evaluation: PACU  Patient participation: complete - patient participated  Level of consciousness: awake  Airway patency: patent  Nausea & Vomiting: no nausea  Cardiovascular status: hemodynamically stable  Respiratory status: acceptable  Hydration status: stable  Pain management: adequate    No notable events documented.

## 2024-06-05 NOTE — DISCHARGE INSTRUCTIONS
HOME CARE DISCHARGE INSTRUCTIONS  CHIDI SÁNCHEZ MD    Surgical Procedure: Myringoplasty/Tympanoplasty    BATHING:  Keep operated ear dry for 45 days.    After one week may bathe and shower with vaseline-soaked cotton ball or ear plug, blocking water from getting into operated ear canal. Keep any skin glue dry as well.    FOOD AND DRINK:  Advance to regular diet as tolerated    WOUND CARE  Change cotton ball daily and as needed.  Clean sutured postauricular incision, if present, with hydrogen peroxide and Qtips twice a day, removing all crusts, but keep any areas of skin glue dry.    ACTIVITY:  No school or physical restrictions except water precautions once off narcotic pain medicines  Avoid pressure changes (flying/diving deep) for 2 months after surgery  Do not blow nose hard enough to pop the ears  Do not stifle a sneeze, rather open the mouth and let it out    MEDICATIONS:  Continue all previous medications per doctor's original instructions.    Prescriptions may have been sent to your pharmacy.       CALL THE DOCTOR IF ANY OF THE FOLLOWING OCCURS:  It is normal to have some drainage from ears over the next couple days.  Call if drainage occurs after 3 days  Temperature over 101F.  Unusual redness, swelling or drainage at the surgical site  Increasing pain, especially after postop day #2.    FOLLOW-UP APPOINTMENT:  See AVS for scheduled ENT post-op appointment       Electronically signed by CHIDI SÁNCHEZ MD on 6/5/2024 at 12:31 AM

## 2024-06-05 NOTE — BRIEF OP NOTE
Brief Postoperative Note      Patient: Carolynn Wynn  YOB: 1955  MRN: [id number]    Date of Procedure: 6/5/2024    Pre-Op Diagnosis Codes:     * Perforation of right tympanic membrane [H72.91]     * Mixed conductive and sensorineural hearing loss of right ear with restricted hearing of left ear [H90.A31]     * Factor V Leiden (HCC) [D68.51]    Post-Op Diagnosis: Same       Procedure(s):  Right Tympanoplasty    Surgeon(s):  Patrick Sánchez MD    Assistant:  * No surgical staff found *    Anesthesia: General    Estimated Blood Loss (mL): less than 50     Complications: None    Specimens:   * No specimens in log *    Implants:  * No implants in log *      Drains: * No LDAs found *    Findings: Underlay temporalis fascia graft perforation was in the posterior inferior quadrant of the right tympanic membrane.  Middle ear was explored with no abnormalities noted.  Ossicular chain appeared to be intact.  Rubber band drain at the donor site and mastoid dressing    Electronically signed by PATRICK SÁNCHEZ MD on 6/5/2024 at 12:44 PM

## 2024-06-06 ENCOUNTER — NURSE ONLY (OUTPATIENT)
Dept: LAB | Age: 69
End: 2024-06-06

## 2024-06-06 ENCOUNTER — OFFICE VISIT (OUTPATIENT)
Dept: ENT CLINIC | Age: 69
End: 2024-06-06

## 2024-06-06 VITALS
HEART RATE: 66 BPM | OXYGEN SATURATION: 97 % | WEIGHT: 199 LBS | TEMPERATURE: 97.5 F | SYSTOLIC BLOOD PRESSURE: 120 MMHG | DIASTOLIC BLOOD PRESSURE: 74 MMHG | BODY MASS INDEX: 27.86 KG/M2 | HEIGHT: 71 IN

## 2024-06-06 DIAGNOSIS — Z98.890 STATUS POST TYMPANOPLASTY: Primary | ICD-10-CM

## 2024-06-06 DIAGNOSIS — N18.31 STAGE 3A CHRONIC KIDNEY DISEASE (HCC): ICD-10-CM

## 2024-06-06 LAB
25(OH)D3 SERPL-MCNC: 30 NG/ML (ref 30–100)
ANION GAP SERPL CALC-SCNC: 13 MEQ/L (ref 8–16)
BUN SERPL-MCNC: 32 MG/DL (ref 7–22)
CALCIUM SERPL-MCNC: 9.5 MG/DL (ref 8.5–10.5)
CO2 SERPL-SCNC: 23 MEQ/L (ref 23–33)
CREAT SERPL-MCNC: 1.5 MG/DL (ref 0.4–1.2)
CREAT UR-MCNC: 63.4 MG/DL
DEPRECATED RDW RBC AUTO: 50.5 FL (ref 35–45)
ERYTHROCYTE [DISTWIDTH] IN BLOOD BY AUTOMATED COUNT: 14.2 % (ref 11.5–14.5)
GFR SERPL CREATININE-BSD FRML MDRD: 37 ML/MIN/1.73M2
GLUCOSE SERPL-MCNC: 77 MG/DL (ref 70–108)
HCT VFR BLD AUTO: 38.1 % (ref 37–47)
HGB BLD-MCNC: 12.1 GM/DL (ref 12–16)
MCH RBC QN AUTO: 30.8 PG (ref 26–33)
MCHC RBC AUTO-ENTMCNC: 31.8 GM/DL (ref 32.2–35.5)
MCV RBC AUTO: 96.9 FL (ref 81–99)
MICROALBUMIN UR-MCNC: 2.14 MG/DL
MICROALBUMIN/CREAT RATIO PNL UR: 34 MG/G (ref 0–30)
PHOSPHATE SERPL-MCNC: 3.6 MG/DL (ref 2.4–4.7)
PLATELET # BLD AUTO: 294 THOU/MM3 (ref 130–400)
POTASSIUM SERPL-SCNC: 4.4 MEQ/L (ref 3.5–5.2)
PTH-INTACT SERPL-MCNC: 65.9 PG/ML (ref 15–65)
RBC # BLD AUTO: 3.93 MILL/MM3 (ref 4.2–5.4)
SODIUM SERPL-SCNC: 142 MEQ/L (ref 135–145)
WBC # BLD AUTO: 10.1 THOU/MM3 (ref 4.8–10.8)

## 2024-06-06 PROCEDURE — 99024 POSTOP FOLLOW-UP VISIT: CPT | Performed by: OTOLARYNGOLOGY

## 2024-06-06 NOTE — OP NOTE
Operative Note      Patient: Carolynn Wynn  YOB: 1955  MRN: [id number]    Date of Procedure: 6/5/2024    Pre-Op Diagnosis Codes:     * Perforation of right tympanic membrane [H72.91]     * Mixed conductive and sensorineural hearing loss of right ear with restricted hearing of left ear [H90.A31]     * Factor V Leiden (HCC) [D68.51]     Post-Op Diagnosis: Same       Procedure(s):  Right Tympanoplasty     Surgeon(s):  Patrick Barrios MD     Assistant:  * No surgical staff found *     Anesthesia: General     Estimated Blood Loss (mL): less than 50      Complications: None     Specimens:   * No specimens in log *     Implants:  * No implants in log *      Drains: * No LDAs found *     Findings: Underlay temporalis fascia graft.  Perforation was in the posterior inferior quadrant of the right tympanic membrane.  Middle ear was explored with no abnormalities noted.  Ossicular chain appeared to be intact.  See photos.  Rubber band drain at the donor site and mastoid dressing    Detailed Description of Procedure:   After an adequate level of general endotracheal anesthesia had been obtained, left ear was prepped and draped in usual manner for tympanoplasty.    Endaural injection with 1% lidocaine with 1 100,000 epinephrine was performed using a 1 cc syringe and a 27-gauge needle.  Margins of the perforation were freshened with female.    Temporalis fascia graft was harvested through a postauricular incision above the temporal line.  Planned incision was marked out with Gentian violet, then 1% lidocaine with 1-100,000 epinephrine was injected subcutaneously.  Incision was carried down through the skin and soft tissue to the temporalis fascia.  Fascia was dissected free on both sides of the skin incision.  1% lidocaine with epinephrine was injected below the fascial layer.    Incision was made through the exposed fascia posteriorly-superiorly.  Edge of the planned graft was grasped with forceps and scissors

## 2024-06-12 ENCOUNTER — OFFICE VISIT (OUTPATIENT)
Dept: NEPHROLOGY | Age: 69
End: 2024-06-12
Payer: MEDICARE

## 2024-06-12 VITALS
BODY MASS INDEX: 27.16 KG/M2 | WEIGHT: 192 LBS | DIASTOLIC BLOOD PRESSURE: 77 MMHG | HEART RATE: 78 BPM | SYSTOLIC BLOOD PRESSURE: 143 MMHG | OXYGEN SATURATION: 99 %

## 2024-06-12 DIAGNOSIS — N18.32 STAGE 3B CHRONIC KIDNEY DISEASE (HCC): Primary | ICD-10-CM

## 2024-06-12 PROCEDURE — G8417 CALC BMI ABV UP PARAM F/U: HCPCS | Performed by: INTERNAL MEDICINE

## 2024-06-12 PROCEDURE — 1090F PRES/ABSN URINE INCON ASSESS: CPT | Performed by: INTERNAL MEDICINE

## 2024-06-12 PROCEDURE — G8427 DOCREV CUR MEDS BY ELIG CLIN: HCPCS | Performed by: INTERNAL MEDICINE

## 2024-06-12 PROCEDURE — 1036F TOBACCO NON-USER: CPT | Performed by: INTERNAL MEDICINE

## 2024-06-12 PROCEDURE — 1123F ACP DISCUSS/DSCN MKR DOCD: CPT | Performed by: INTERNAL MEDICINE

## 2024-06-12 PROCEDURE — 99214 OFFICE O/P EST MOD 30 MIN: CPT | Performed by: INTERNAL MEDICINE

## 2024-06-12 PROCEDURE — 3017F COLORECTAL CA SCREEN DOC REV: CPT | Performed by: INTERNAL MEDICINE

## 2024-06-12 PROCEDURE — G8399 PT W/DXA RESULTS DOCUMENT: HCPCS | Performed by: INTERNAL MEDICINE

## 2024-06-12 NOTE — PROGRESS NOTES
OR        VITALS:  BP (!) 143/77 (Site: Right Upper Arm, Position: Sitting, Cuff Size: Large Adult)   Pulse 78   Wt 87.1 kg (192 lb)   SpO2 99%   BMI 27.16 kg/m²   Wt Readings from Last 3 Encounters:   06/12/24 87.1 kg (192 lb)   06/06/24 90.3 kg (199 lb)   06/05/24 87.2 kg (192 lb 5 oz)     Body mass index is 27.16 kg/m².     General Appearance: alert and cooperative with exam, appears comfortable, no distress  HEENT: EOMI, moist oral mucus membranes  Neck: No jugular venous distention,   Lungs: Air entry B/L, no crackles or rales, no use of accessory muscles  Heart: S1, S2 heard, no rub  GI: soft, non-tender, no guarding  Extremities: 2+ LE edema  Skin: warm, dry  Neurologic: no tremor, no asterixis, no focal neurologic deficits     Medications:  Current Outpatient Medications   Medication Sig Dispense Refill    amLODIPine (NORVASC) 10 MG tablet Take 1 tablet by mouth daily 90 tablet 1    colchicine (COLCRYS) 0.6 MG tablet Take 1 tablet by mouth daily 90 tablet 1    allopurinol (ZYLOPRIM) 100 MG tablet Take 1 tablet by mouth daily 90 tablet 1    spironolactone (ALDACTONE) 25 MG tablet Take 1 tablet by mouth three times a week 30 tablet 1    tiotropium (SPIRIVA HANDIHALER) 18 MCG inhalation capsule Inhale 1 capsule into the lungs daily 90 capsule 1    apixaban (ELIQUIS) 5 MG TABS tablet Take 1 tablet by mouth 2 times daily 180 tablet 1    sertraline (ZOLOFT) 50 MG tablet Take 1 tablet by mouth daily 90 tablet 1    albuterol sulfate HFA (VENTOLIN HFA) 108 (90 Base) MCG/ACT inhaler Inhale 2 puffs into the lungs 4 times daily as needed for Wheezing 18 g 0     No current facility-administered medications for this visit.        Laboratory & Diagnostics:  CBC:   Lab Results   Component Value Date    WBC 10.1 06/06/2024    HGB 12.1 06/06/2024    HCT 38.1 06/06/2024    MCV 96.9 06/06/2024     06/06/2024     BMP:    Lab Results   Component Value Date     06/06/2024     05/15/2024     03/21/2024

## 2024-06-13 PROBLEM — Z98.890 STATUS POST TYMPANOPLASTY: Status: ACTIVE | Noted: 2024-06-13

## 2024-06-14 NOTE — PROGRESS NOTES
Patient presents 1 day status post tympanoplasty.  Bandages removed.  Right ear canal has a normal postoperative appearance.       Diagnosis Orders   1. Status post right tympanoplasty           Water precautions were reiterated.  Call for drainage or increasing pain.    Return in 6 weeks  
dermatitis     Dr. Chavez office - Ez, given triamcinolone 40 mg IM, triamcinolone cream BID prn and hydroxyzine 1-2 tab at bedtime    Osteopenia     low FRAX score    PONV (postoperative nausea and vomiting)     Stage 3a chronic kidney disease (HCC)       Past Surgical History:   Procedure Laterality Date    CT BIOPSY RENAL  04/06/2022    CT BIOPSY RENAL 4/6/2022 [facility] CT SCAN    SHOULDER SURGERY Bilateral     rotator cuff     Family History   Problem Relation Age of Onset    Deep Vein Thrombosis Mother     Coronary Art Dis Father     GERD Father     Other Father 70        ESRD, hearing los    Other Sister 45        ESRD    GERD Sister     Pulmonary Embolism Sister     Hearing Loss Sister     Cancer Brother         lung, kidney    Hearing Loss Brother     Hearing Loss Brother      Social History     Tobacco Use    Smoking status: Never     Passive exposure: Never    Smokeless tobacco: Never   Substance Use Topics    Alcohol use: Not Currently       Subjective:      Review of Systems   Constitutional:  Negative for activity change, appetite change, chills, diaphoresis, fatigue, fever and unexpected weight change.   HENT:  Negative for congestion, dental problem, ear discharge, ear pain, facial swelling, hearing loss, mouth sores, nosebleeds, postnasal drip, rhinorrhea, sinus pressure, sneezing, sore throat, tinnitus, trouble swallowing and voice change.    Eyes:  Negative for visual disturbance.   Respiratory:  Negative for apnea, cough, choking, chest tightness, shortness of breath, wheezing and stridor.    Cardiovascular:  Negative for chest pain, palpitations and leg swelling.   Gastrointestinal:  Negative for abdominal pain, diarrhea, nausea and vomiting.   Endocrine: Negative for cold intolerance, heat intolerance, polydipsia and polyuria.   Genitourinary:  Negative for dysuria, enuresis and hematuria.   Musculoskeletal:  Negative for arthralgias, gait problem, neck pain and neck stiffness.   Skin:

## 2024-06-17 ENCOUNTER — TELEPHONE (OUTPATIENT)
Dept: ENT CLINIC | Age: 69
End: 2024-06-17

## 2024-06-17 ENCOUNTER — TELEPHONE (OUTPATIENT)
Dept: INTERNAL MEDICINE CLINIC | Age: 69
End: 2024-06-17

## 2024-06-17 DIAGNOSIS — I10 HYPERTENSION, UNSPECIFIED TYPE: Primary | ICD-10-CM

## 2024-06-17 DIAGNOSIS — T50.905S ADVERSE EFFECT OF DRUG, SEQUELA: ICD-10-CM

## 2024-06-17 NOTE — TELEPHONE ENCOUNTER
I received a call from Jada from [facility].   She called to find out if it was ok for this patient to resume her Cozaar at this time.   They would like to get her back on it.   She had surgery with Dr. Barrios on 6/5.    I can contact Jada at [phone] and let her know so she can communicate with the patient.  Please advise.

## 2024-06-17 NOTE — TELEPHONE ENCOUNTER
----- Message from Ted Silva DO sent at 6/17/2024  1:25 PM EDT -----  Hi Justin, Is it possible to reach out to ENT to determine if okay to resume Cozaar? She has appointment next month but I spoke with Dr. Pathak and she did say she seemed swollen so would like to try to take care of it before her appointment. Thank you!    ----- Message -----  From: Michelle Pathak DO  Sent: 6/13/2024  12:10 PM EDT  To: Ted Silva DO    Ok thank you for clarifying!  I would maybe touch base with ENT to see if they are okay with using an ARB now.  If so would maybe take her off the amlodipine, use losartan and maybe just a low dose of lasix 20 mg daily instead of the spironolactone just b/c of the risk of hyperkalemia if she's on both losartan and aldactone.  If these changes are made then would repeat a bmp 2 weeks after.  Thanks!!  ----- Message -----  From: Ted Silva DO  Sent: 6/12/2024   4:52 PM EDT  To: DO Alfonso Camarena Dr., yes a lot of changes! ENT requested no ACE/ARB so we were working on HTN control with HCTZ and Norvasc. She then had a gout flare so switched to Bumex. She was switched to Spironolactone due elevation of Cr. I agree, she would do better with Losartan but we were at a tough spot with ENT requesting to not use those meds. It looks like she did just get the tympanoplasty though so this may change things. Any assistance you can offer would be greatly appreciated.        ----- Message -----  From: Michelle Pathak DO  Sent: 6/12/2024  10:38 AM EDT  To: Ted Silva DO    Wow, this lady has had a lot of med changes since I saw her a year ago.   I tried sifting through to decipher what was going on but figured I'd just reach out since you have been seeing her.   She's off losartan now.  And on amlodipine 10 and spironolactone. She's swollen which might be a side effect from the amlodipine. Looks like she has also been on HCTZ then bumex and now

## 2024-06-17 NOTE — TELEPHONE ENCOUNTER
I spoke with Gisela at ENT.   Dr. Barrios is not in today but she will message the provider on call and get back to us.

## 2024-06-18 NOTE — TELEPHONE ENCOUNTER
I called Jada at Internal Medicine with Dr. Barrios's approval to restart the Cozaar.   Jada will reach out to the patient after it is determined she should restart the medication.

## 2024-06-18 NOTE — TELEPHONE ENCOUNTER
Maddie from Dr. Barrios's office called saying Dr. Barrios says OK to resume Cozaar.    This was dropped from her list.    OK to resume her regular dose of 100 mg. Daily now?

## 2024-06-19 NOTE — TELEPHONE ENCOUNTER
Stop Norvasc and Spironolactone  Start Cozaar 100mg and Lasix 20mg daily  Obtain BMP in 2 weeks  Keep July appointment

## 2024-06-20 NOTE — TELEPHONE ENCOUNTER
Lasix script will need to be called in.   I called pt and left a voicemail for her to call back for instructions.

## 2024-06-20 NOTE — TELEPHONE ENCOUNTER
Pt called back and left a voicemail that she is in [place] and will be back on Monday June 24th.   She will call back then to get all of the details.

## 2024-06-21 DIAGNOSIS — I10 HYPERTENSION, UNSPECIFIED TYPE: Primary | ICD-10-CM

## 2024-06-21 RX ORDER — FUROSEMIDE 20 MG/1
20 TABLET ORAL DAILY
Qty: 90 TABLET | Refills: 1 | Status: SHIPPED | OUTPATIENT
Start: 2024-06-21 | End: 2024-12-18

## 2024-06-21 RX ORDER — LOSARTAN POTASSIUM 100 MG/1
100 TABLET ORAL DAILY
Qty: 90 TABLET | Refills: 1 | Status: SHIPPED | OUTPATIENT
Start: 2024-06-21

## 2024-06-24 NOTE — TELEPHONE ENCOUNTER
I spoke with pt and advised her dr. Silva and Dr. Pathak had been discussing her case.   I advised her Dr. Silva asked that I contact Dr. Barrios about her going back to losartan and his office did agree that that was OK.   I advised her  per Dr. Silva to stop the Amlodipine and Spironolactone and go on Losartan and Furosemide.  She is to get a BMP in 2 weeks after starting the new medication and also report in BPs and how her swelling is on the Losartan and Furosemide.  She verbalizes understanding and will get the labs at [facility] in the [address].

## 2024-06-26 ENCOUNTER — APPOINTMENT (OUTPATIENT)
Dept: GENERAL RADIOLOGY | Age: 69
End: 2024-06-26
Payer: MEDICARE

## 2024-06-26 ENCOUNTER — HOSPITAL ENCOUNTER (EMERGENCY)
Age: 69
Discharge: HOME | End: 2024-06-26
Payer: MEDICARE

## 2024-06-26 VITALS
HEIGHT: 71 IN | RESPIRATION RATE: 16 BRPM | OXYGEN SATURATION: 100 % | DIASTOLIC BLOOD PRESSURE: 70 MMHG | BODY MASS INDEX: 27.16 KG/M2 | SYSTOLIC BLOOD PRESSURE: 155 MMHG | TEMPERATURE: 98.2 F | HEART RATE: 74 BPM

## 2024-06-26 DIAGNOSIS — M25.571 ACUTE RIGHT ANKLE PAIN: Primary | ICD-10-CM

## 2024-06-26 DIAGNOSIS — M10.9 ACUTE GOUT OF RIGHT ANKLE, UNSPECIFIED CAUSE: ICD-10-CM

## 2024-06-26 LAB
ANION GAP SERPL CALC-SCNC: 13 MEQ/L (ref 8–16)
BASOPHILS ABSOLUTE: 0 THOU/MM3 (ref 0–0.1)
BASOPHILS NFR BLD AUTO: 0.5 %
BUN SERPL-MCNC: 31 MG/DL (ref 7–22)
CALCIUM SERPL-MCNC: 9.2 MG/DL (ref 8.5–10.5)
CHLORIDE SERPL-SCNC: 104 MEQ/L (ref 98–111)
CO2 SERPL-SCNC: 24 MEQ/L (ref 23–33)
CREAT SERPL-MCNC: 1.4 MG/DL (ref 0.4–1.2)
DEPRECATED RDW RBC AUTO: 48.9 FL (ref 35–45)
EOSINOPHIL NFR BLD AUTO: 3.6 %
EOSINOPHILS ABSOLUTE: 0.3 THOU/MM3 (ref 0–0.4)
ERYTHROCYTE [DISTWIDTH] IN BLOOD BY AUTOMATED COUNT: 14.3 % (ref 11.5–14.5)
GFR SERPL CREATININE-BSD FRML MDRD: 40 ML/MIN/1.73M2
GLUCOSE SERPL-MCNC: 88 MG/DL (ref 70–108)
HCT VFR BLD AUTO: 40.3 % (ref 37–47)
HGB BLD-MCNC: 12.9 GM/DL (ref 12–16)
IMM GRANULOCYTES # BLD AUTO: 0.03 THOU/MM3 (ref 0–0.07)
IMM GRANULOCYTES NFR BLD AUTO: 0.4 %
LYMPHOCYTES ABSOLUTE: 1.5 THOU/MM3 (ref 1–4.8)
LYMPHOCYTES NFR BLD AUTO: 18.2 %
MCH RBC QN AUTO: 29.9 PG (ref 26–33)
MCHC RBC AUTO-ENTMCNC: 32 GM/DL (ref 32.2–35.5)
MCV RBC AUTO: 93.5 FL (ref 81–99)
MONOCYTES ABSOLUTE: 0.8 THOU/MM3 (ref 0.4–1.3)
MONOCYTES NFR BLD AUTO: 9.1 %
NEUTROPHILS ABSOLUTE: 5.7 THOU/MM3 (ref 1.8–7.7)
NEUTROPHILS NFR BLD AUTO: 68.2 %
NRBC BLD AUTO-RTO: 0 /100 WBC
OSMOLALITY SERPL CALC.SUM OF ELEC: 287.2 MOSMOL/KG (ref 275–300)
PLATELET # BLD AUTO: 319 THOU/MM3 (ref 130–400)
PMV BLD AUTO: 9.6 FL (ref 9.4–12.4)
POTASSIUM SERPL-SCNC: 4.8 MEQ/L (ref 3.5–5.2)
RBC # BLD AUTO: 4.31 MILL/MM3 (ref 4.2–5.4)
SODIUM SERPL-SCNC: 141 MEQ/L (ref 135–145)
URATE SERPL-MCNC: 5.9 MG/DL (ref 2.4–5.7)
WBC # BLD AUTO: 8.3 THOU/MM3 (ref 4.8–10.8)

## 2024-06-26 PROCEDURE — 99284 EMERGENCY DEPT VISIT MOD MDM: CPT

## 2024-06-26 PROCEDURE — 84550 ASSAY OF BLOOD/URIC ACID: CPT

## 2024-06-26 PROCEDURE — 73610 X-RAY EXAM OF ANKLE: CPT

## 2024-06-26 PROCEDURE — 85025 COMPLETE CBC W/AUTO DIFF WBC: CPT

## 2024-06-26 PROCEDURE — 80048 BASIC METABOLIC PNL TOTAL CA: CPT

## 2024-06-26 PROCEDURE — 6370000000 HC RX 637 (ALT 250 FOR IP): Performed by: PHYSICIAN ASSISTANT

## 2024-06-26 PROCEDURE — 36415 COLL VENOUS BLD VENIPUNCTURE: CPT

## 2024-06-26 PROCEDURE — 6360000002 HC RX W HCPCS: Performed by: PHYSICIAN ASSISTANT

## 2024-06-26 RX ORDER — HYDROCODONE BITARTRATE AND ACETAMINOPHEN 5; 325 MG/1; MG/1
1 TABLET ORAL ONCE
Status: COMPLETED | OUTPATIENT
Start: 2024-06-26 | End: 2024-06-26

## 2024-06-26 RX ORDER — DEXAMETHASONE 4 MG/1
4 TABLET ORAL ONCE
Status: COMPLETED | OUTPATIENT
Start: 2024-06-26 | End: 2024-06-26

## 2024-06-26 RX ORDER — NAPROXEN 250 MG/1
500 TABLET ORAL ONCE
Status: COMPLETED | OUTPATIENT
Start: 2024-06-26 | End: 2024-06-26

## 2024-06-26 RX ORDER — PREDNISONE 50 MG/1
50 TABLET ORAL DAILY
Qty: 3 TABLET | Refills: 0 | Status: SHIPPED | OUTPATIENT
Start: 2024-06-26 | End: 2024-07-01

## 2024-06-26 RX ORDER — HYDROCODONE BITARTRATE AND ACETAMINOPHEN 5; 325 MG/1; MG/1
1 TABLET ORAL EVERY 6 HOURS PRN
Qty: 12 TABLET | Refills: 0 | Status: SHIPPED | OUTPATIENT
Start: 2024-06-26 | End: 2024-06-29

## 2024-06-26 RX ADMIN — HYDROCODONE BITARTRATE AND ACETAMINOPHEN 1 TABLET: 5; 325 TABLET ORAL at 16:29

## 2024-06-26 RX ADMIN — DEXAMETHASONE 4 MG: 4 TABLET ORAL at 16:29

## 2024-06-26 RX ADMIN — NAPROXEN 500 MG: 250 TABLET ORAL at 16:30

## 2024-06-26 ASSESSMENT — PAIN DESCRIPTION - DESCRIPTORS: DESCRIPTORS: TIGHTNESS

## 2024-06-26 ASSESSMENT — PAIN - FUNCTIONAL ASSESSMENT: PAIN_FUNCTIONAL_ASSESSMENT: 0-10

## 2024-06-26 ASSESSMENT — PAIN DESCRIPTION - LOCATION: LOCATION: ANKLE

## 2024-06-26 ASSESSMENT — PAIN SCALES - GENERAL: PAINLEVEL_OUTOF10: 9

## 2024-06-26 ASSESSMENT — PAIN DESCRIPTION - ORIENTATION: ORIENTATION: RIGHT

## 2024-06-26 ASSESSMENT — PAIN DESCRIPTION - PAIN TYPE: TYPE: ACUTE PAIN

## 2024-06-26 NOTE — ED PROVIDER NOTES
RDW-SD 48.9 (*)     All other components within normal limits   BASIC METABOLIC PANEL - Abnormal; Notable for the following components:    BUN 31 (*)     Creatinine 1.4 (*)     All other components within normal limits   URIC ACID - Abnormal; Notable for the following components:    Uric Acid 5.9 (*)     All other components within normal limits   GLOMERULAR FILTRATION RATE, ESTIMATED - Abnormal; Notable for the following components:    Est, Glom Filt Rate 40 (*)     All other components within normal limits   ANION GAP   OSMOLALITY       All other labs were within normal range or not returned as of this dictation.    EMERGENCY DEPARTMENT COURSE and Medical Decision Making:       Differential diagnosis:  Arthralgia/gout/osteoarthritis/ankle      MDM  /   CBC CMP did not show any acute findings.  Uric acid was elevated at 5.9.  Patient updated all results including x-rays which did not show any acute fractures.  Patient was placed on Norco and prednisone.  Do not use any NSAIDs because she does have borderline renal insufficiency.  She is to follow PCP and given strict turn precautions      CONSULTS: (None if blank)  None    Procedures: (None if blank)       CLINICAL IMPRESSION      1. Acute right ankle pain    2. Acute gout of right ankle, unspecified cause          DISPOSITION/PLAN   DISPOSITION Decision To Discharge 06/26/2024 04:42:36 PM      PATIENT REFERRED TO:  Nyasia Pena MD  [address] [place] [address], [address] [place] [address]  [phone]    In 1 week        DISCHARGE MEDICATIONS:  Discharge Medication List as of 6/26/2024  4:44 PM        START taking these medications    Details   HYDROcodone-acetaminophen (NORCO) 5-325 MG per tablet Take 1 tablet by mouth every 6 hours as needed for Pain for up to 3 days. Intended supply: 3 days. Take lowest dose possible to manage pain Max Daily Amount: 4 tablets, Disp-12 tablet, R-0Print      predniSONE (DELTASONE) 50 MG tablet Take 1 tablet by mouth daily for 5

## 2024-06-26 NOTE — ED TRIAGE NOTES
Patient presents to ER with complaints of right ankle pain and swelling that started 4 to 5 days ago. Patient reports hx of gout and takes medication for it.

## 2024-07-05 ENCOUNTER — OFFICE VISIT (OUTPATIENT)
Dept: ONCOLOGY | Age: 69
End: 2024-07-05

## 2024-07-05 ENCOUNTER — HOSPITAL ENCOUNTER (OUTPATIENT)
Dept: INFUSION THERAPY | Age: 69
Discharge: HOME | End: 2024-07-05
Payer: MEDICARE

## 2024-07-05 VITALS
HEIGHT: 71 IN | DIASTOLIC BLOOD PRESSURE: 63 MMHG | WEIGHT: 193 LBS | RESPIRATION RATE: 18 BRPM | SYSTOLIC BLOOD PRESSURE: 135 MMHG | OXYGEN SATURATION: 99 % | TEMPERATURE: 98.2 F | HEART RATE: 72 BPM | BODY MASS INDEX: 27.02 KG/M2

## 2024-07-05 VITALS
RESPIRATION RATE: 18 BRPM | DIASTOLIC BLOOD PRESSURE: 63 MMHG | HEART RATE: 72 BPM | SYSTOLIC BLOOD PRESSURE: 135 MMHG | TEMPERATURE: 98.2 F

## 2024-07-05 DIAGNOSIS — Z86.2 HISTORY OF FACTOR V LEIDEN MUTATION: Primary | ICD-10-CM

## 2024-07-05 DIAGNOSIS — Z86.718 HISTORY OF VENOUS THROMBOEMBOLISM: ICD-10-CM

## 2024-07-05 PROCEDURE — 99211 OFF/OP EST MAY X REQ PHY/QHP: CPT

## 2024-07-05 NOTE — PROGRESS NOTES
[facility] [address], [address]  [employer] [address]  Dept: [phone]  Dept Fax: [phone] Loc: [phone]      Visit Date:7/5/2024     Carolynn Wynn is a 69 y.o. female who presents today for:   Chief Complaint   Patient presents with    Follow-up     History of factor V Leiden mutation        HPI:   Carolynn Wynn is a 69 y.o. female referred to Hematology/Oncology clinic for evaluation of Factor V Leiden, history of PE/DVT and perforation of right TM per ENT, Dr. Barrios. She was seen as a new patient on 2/27/23. The patient has been following with ENT for right ear pain and drainage. She has been recommended potential tympanoplasty. She has follow up with Dr. Barrios on 3/24/23 and decision for surgery at that time.   The patient reports she was diagnosed with PE over 15 years ago. She reports she treated initially with Heparin and was on Coumadin for several years. She affirms history of DVT. She states her first DVT occurred approximately 11 years ago following rotator cuff repair. She had 2nd DVT in 2021. Per EMR was diagnosed 9/28/2021 on venous doppler of right lower extremity showing acute DVT of right mid and distal superficial femoral, popliteal, posterior tibial, peroneal and anterior tibial veins. She has been on Eliquis since that time. She states she was diagnosed with Factor V leiden mutation after diagnosis of PE. She is unsure if homozygous or heterozygous. She affirms history of miscarriage. She tolerates Eliquis well. Denies any abnormal bleeding.  PMH includes Factor V Leiden, history of DVT/PE, CKD, HTN, COPD.  Pertinent family history includes positive Factor V Leiden in her brother, niece and sister, likely mother was diagnosed.         Interval History 7/5/2024:   The patient is here for follow up evaluation. She was last seen in clinic in January 2024 pre-operatively for right tympanoplasty. She was given recommendation to hold Eliquis 3 days

## 2024-07-08 ENCOUNTER — NURSE ONLY (OUTPATIENT)
Dept: LAB | Age: 69
End: 2024-07-08

## 2024-07-08 DIAGNOSIS — I10 HYPERTENSION, UNSPECIFIED TYPE: ICD-10-CM

## 2024-07-08 LAB
ANION GAP SERPL CALC-SCNC: 16 MEQ/L (ref 8–16)
BUN SERPL-MCNC: 37 MG/DL (ref 7–22)
CALCIUM SERPL-MCNC: 9.2 MG/DL (ref 8.5–10.5)
CHLORIDE SERPL-SCNC: 106 MEQ/L (ref 98–111)
CO2 SERPL-SCNC: 20 MEQ/L (ref 23–33)
CREAT SERPL-MCNC: 1.5 MG/DL (ref 0.4–1.2)
GFR SERPL CREATININE-BSD FRML MDRD: 37 ML/MIN/1.73M2
GLUCOSE SERPL-MCNC: 91 MG/DL (ref 70–108)
POTASSIUM SERPL-SCNC: 4.7 MEQ/L (ref 3.5–5.2)
SODIUM SERPL-SCNC: 142 MEQ/L (ref 135–145)

## 2024-07-23 ENCOUNTER — OFFICE VISIT (OUTPATIENT)
Dept: ENT CLINIC | Age: 69
End: 2024-07-23

## 2024-07-23 VITALS
HEIGHT: 71 IN | HEART RATE: 70 BPM | TEMPERATURE: 97.5 F | RESPIRATION RATE: 18 BRPM | SYSTOLIC BLOOD PRESSURE: 120 MMHG | DIASTOLIC BLOOD PRESSURE: 72 MMHG | OXYGEN SATURATION: 98 % | WEIGHT: 193.7 LBS | BODY MASS INDEX: 27.12 KG/M2

## 2024-07-23 DIAGNOSIS — Z98.890 STATUS POST TYMPANOPLASTY: Primary | ICD-10-CM

## 2024-07-23 DIAGNOSIS — H90.A31 MIXED CONDUCTIVE AND SENSORINEURAL HEARING LOSS OF RIGHT EAR WITH RESTRICTED HEARING OF LEFT EAR: ICD-10-CM

## 2024-07-23 PROCEDURE — 99024 POSTOP FOLLOW-UP VISIT: CPT | Performed by: OTOLARYNGOLOGY

## 2024-07-23 RX ORDER — SPIRONOLACTONE 25 MG/1
TABLET ORAL
COMMUNITY
Start: 2024-07-18 | End: 2024-07-24 | Stop reason: ALTCHOICE

## 2024-07-23 RX ORDER — AMLODIPINE BESYLATE 10 MG/1
TABLET ORAL
COMMUNITY
Start: 2024-07-07 | End: 2024-07-23

## 2024-07-23 RX ORDER — HYDROXYZINE HYDROCHLORIDE 10 MG/1
TABLET, FILM COATED ORAL
COMMUNITY
Start: 2024-07-06 | End: 2024-07-23

## 2024-07-23 RX ORDER — BUMETANIDE 0.5 MG/1
TABLET ORAL
COMMUNITY
Start: 2024-07-15 | End: 2024-07-23

## 2024-07-24 ENCOUNTER — OFFICE VISIT (OUTPATIENT)
Dept: INTERNAL MEDICINE CLINIC | Age: 69
End: 2024-07-24
Payer: MEDICARE

## 2024-07-24 VITALS
BODY MASS INDEX: 27.49 KG/M2 | TEMPERATURE: 98.1 F | OXYGEN SATURATION: 96 % | RESPIRATION RATE: 18 BRPM | DIASTOLIC BLOOD PRESSURE: 60 MMHG | HEART RATE: 86 BPM | WEIGHT: 194.4 LBS | SYSTOLIC BLOOD PRESSURE: 114 MMHG

## 2024-07-24 DIAGNOSIS — R05.9 COUGH, UNSPECIFIED TYPE: Primary | ICD-10-CM

## 2024-07-24 DIAGNOSIS — M1A.9XX0 CHRONIC GOUT WITHOUT TOPHUS, UNSPECIFIED CAUSE, UNSPECIFIED SITE: ICD-10-CM

## 2024-07-24 DIAGNOSIS — R07.9 CHEST PAIN, UNSPECIFIED TYPE: ICD-10-CM

## 2024-07-24 DIAGNOSIS — N18.31 STAGE 3A CHRONIC KIDNEY DISEASE (HCC): ICD-10-CM

## 2024-07-24 DIAGNOSIS — M85.852 OSTEOPENIA OF NECKS OF BOTH FEMURS: ICD-10-CM

## 2024-07-24 DIAGNOSIS — R42 DIZZINESS: ICD-10-CM

## 2024-07-24 DIAGNOSIS — R06.09 DOE (DYSPNEA ON EXERTION): ICD-10-CM

## 2024-07-24 DIAGNOSIS — E87.20 METABOLIC ACIDOSIS: ICD-10-CM

## 2024-07-24 DIAGNOSIS — Z12.31 ENCOUNTER FOR SCREENING MAMMOGRAM FOR BREAST CANCER: ICD-10-CM

## 2024-07-24 DIAGNOSIS — R06.83 SNORING: ICD-10-CM

## 2024-07-24 DIAGNOSIS — F32.A ANXIETY AND DEPRESSION: ICD-10-CM

## 2024-07-24 DIAGNOSIS — M85.851 OSTEOPENIA OF NECKS OF BOTH FEMURS: ICD-10-CM

## 2024-07-24 DIAGNOSIS — F41.9 ANXIETY AND DEPRESSION: ICD-10-CM

## 2024-07-24 DIAGNOSIS — G47.19 EXCESSIVE DAYTIME SLEEPINESS: ICD-10-CM

## 2024-07-24 DIAGNOSIS — Z86.718 HISTORY OF DVT OF LOWER EXTREMITY: ICD-10-CM

## 2024-07-24 DIAGNOSIS — D68.51 FACTOR V LEIDEN (HCC): ICD-10-CM

## 2024-07-24 DIAGNOSIS — I10 HYPERTENSION, UNSPECIFIED TYPE: ICD-10-CM

## 2024-07-24 PROCEDURE — 99214 OFFICE O/P EST MOD 30 MIN: CPT

## 2024-07-24 PROCEDURE — G8399 PT W/DXA RESULTS DOCUMENT: HCPCS

## 2024-07-24 PROCEDURE — 1090F PRES/ABSN URINE INCON ASSESS: CPT

## 2024-07-24 PROCEDURE — 3078F DIAST BP <80 MM HG: CPT

## 2024-07-24 PROCEDURE — G8427 DOCREV CUR MEDS BY ELIG CLIN: HCPCS

## 2024-07-24 PROCEDURE — 3074F SYST BP LT 130 MM HG: CPT

## 2024-07-24 PROCEDURE — G8417 CALC BMI ABV UP PARAM F/U: HCPCS

## 2024-07-24 PROCEDURE — 1123F ACP DISCUSS/DSCN MKR DOCD: CPT

## 2024-07-24 PROCEDURE — 1036F TOBACCO NON-USER: CPT

## 2024-07-24 PROCEDURE — 3017F COLORECTAL CA SCREEN DOC REV: CPT

## 2024-07-24 RX ORDER — ALLOPURINOL 100 MG/1
100 TABLET ORAL DAILY
Qty: 90 TABLET | Refills: 1 | Status: SHIPPED | OUTPATIENT
Start: 2024-07-24

## 2024-07-24 RX ORDER — FUROSEMIDE 20 MG/1
20 TABLET ORAL DAILY PRN
Qty: 90 TABLET | Refills: 1 | Status: CANCELLED | OUTPATIENT
Start: 2024-07-24 | End: 2025-01-20

## 2024-07-24 NOTE — PROGRESS NOTES
PATIENT NAME: ROSA RIVERA  : 1955  DOS: 2024    This is a 69-year-old female with a past medical history of PE/DVT maintained on Eliquis, perforation of right tympanic membrane s/p right tympanoplasty, CKD stage III (baseline creatinine 1.4-1.5) with renal biopsy showing glomerular megaly and then basement membrane disease, essential hypertension, osteopenia, gout.    Patient was last seen and evaluated in the internal medicine residency clinic on 2024.  At that time the patient presented as a follow-up from an acute gout flare which had been controlled with colchicine, corticosteroids, and discontinuation of hydrochlorothiazide.  The patient was noted at that time to be planning to have a tympanoplasty for ruptured tympanic membrane and was given instructions for antihypertensive and anticoagulation use perioperatively.    Since the patient's last encounter:  2024 otolaryngology encounter noting that the patient had evidence of otitis media with culture growing gram-negative rods for which the patient was recommended to continue with antibiotic therapy prior to consideration for surgery.  2024 postoperative documentation of right tympanoplasty for perforation of the right tympanic membrane.  2024 nephrology encounter for known CKD stage III.  Secondary to worsening renal function a recommendation was made for the patient to go back on losartan secondary to lower extremity edema.  2024 follow-up evaluation by otolaryngology within normal postoperative appearance noted.  -2024 Nephrology/IM care coordination regarding patient's antihypertensive regimen  2024 ENT clearance to resume ARB  2024 ER Encounter secondary to 4-5 day history of right ankle pain and swelling. The patient was noted to have an elevated uric acid. The patient given a prescription for prednisone and hydrocodone-acetaminophen. The patient was instructed to not utilize NSAIDs

## 2024-07-24 NOTE — PATIENT INSTRUCTIONS
Check your blood pressure daily in the morning before you take your losartan.  Do not take your furosemide (Lasix) unless you have 1) weight gain of 3 pounds in one day, 2) 5 pound weight gain in 1 week, 3) difficulty lying flat due to shortness of breath, 4) significant swelling in your legs  A chest X-ray was ordered as well as a Ventilation/Perfusion Lung Scan  A mammogram was ordered  Start taking a Vitamin D supplement.   Use the new Spiriva Respimat inhaler for two weeks as directed in the office; if it works for you, we can give you a new prescription for it  Have your labs done in two weeks after stopping your furosemide, Lasix

## 2024-07-29 ENCOUNTER — HOSPITAL ENCOUNTER (OUTPATIENT)
Dept: GENERAL RADIOLOGY | Age: 69
Discharge: HOME | End: 2024-07-29
Payer: MEDICARE

## 2024-07-29 ENCOUNTER — HOSPITAL ENCOUNTER (OUTPATIENT)
Age: 69
Discharge: HOME | End: 2024-07-29
Payer: MEDICARE

## 2024-07-29 DIAGNOSIS — R06.09 DOE (DYSPNEA ON EXERTION): ICD-10-CM

## 2024-07-29 DIAGNOSIS — R05.9 COUGH, UNSPECIFIED TYPE: ICD-10-CM

## 2024-07-29 PROCEDURE — 71046 X-RAY EXAM CHEST 2 VIEWS: CPT

## 2024-07-30 NOTE — PROGRESS NOTES
Patient is here for postop evaluation several weeks following a right tympanoplasty.  She has no complaints.  Hearing is better than it had been on the right side.    Right ear: Graft is visible with no inflammation.  There is no debris in the canal.    Binocular microscopy performed.  Pneumatic otoscopy indicated the tympanic membrane was intact.  100% take.     Diagnosis Orders   1. Status post right tympanoplasty  Audiometry with tympanometry      2. Mixed conductive and sensorineural hearing loss of right ear with restricted hearing of left ear  Audiometry with tympanometry

## 2024-07-31 ENCOUNTER — HOSPITAL ENCOUNTER (OUTPATIENT)
Dept: WOMENS IMAGING | Age: 69
Discharge: HOME | End: 2024-07-31
Payer: MEDICARE

## 2024-07-31 ENCOUNTER — HOSPITAL ENCOUNTER (OUTPATIENT)
Dept: NUCLEAR MEDICINE | Age: 69
Discharge: HOME | End: 2024-07-31
Payer: MEDICARE

## 2024-07-31 VITALS — BODY MASS INDEX: 27.02 KG/M2 | WEIGHT: 193 LBS | HEIGHT: 71 IN

## 2024-07-31 DIAGNOSIS — R06.09 DOE (DYSPNEA ON EXERTION): ICD-10-CM

## 2024-07-31 DIAGNOSIS — Z12.31 ENCOUNTER FOR SCREENING MAMMOGRAM FOR BREAST CANCER: ICD-10-CM

## 2024-07-31 DIAGNOSIS — R05.9 COUGH, UNSPECIFIED TYPE: ICD-10-CM

## 2024-07-31 DIAGNOSIS — D68.51 FACTOR V LEIDEN (HCC): ICD-10-CM

## 2024-07-31 PROCEDURE — 77063 BREAST TOMOSYNTHESIS BI: CPT

## 2024-07-31 PROCEDURE — 78582 LUNG VENTILAT&PERFUS IMAGING: CPT

## 2024-07-31 PROCEDURE — A9540 TC99M MAA: HCPCS | Performed by: INTERNAL MEDICINE

## 2024-07-31 PROCEDURE — A9558 XE133 XENON 10MCI: HCPCS | Performed by: INTERNAL MEDICINE

## 2024-07-31 PROCEDURE — 3430000000 HC RX DIAGNOSTIC RADIOPHARMACEUTICAL: Performed by: INTERNAL MEDICINE

## 2024-07-31 RX ORDER — XENON XE-133 10 MCI/1
9.5 GAS RESPIRATORY (INHALATION)
Status: COMPLETED | OUTPATIENT
Start: 2024-07-31 | End: 2024-07-31

## 2024-07-31 RX ADMIN — XENON XE-133 9.5 MILLICURIE: 10 GAS RESPIRATORY (INHALATION) at 10:00

## 2024-07-31 RX ADMIN — Medication 5.7 MILLICURIE: at 10:13

## 2024-08-07 ENCOUNTER — LAB (OUTPATIENT)
Dept: LAB | Age: 69
End: 2024-08-07

## 2024-08-07 DIAGNOSIS — N18.31 STAGE 3A CHRONIC KIDNEY DISEASE (HCC): ICD-10-CM

## 2024-08-07 DIAGNOSIS — E87.20 METABOLIC ACIDOSIS: ICD-10-CM

## 2024-08-07 LAB
ANION GAP SERPL CALC-SCNC: 11 MEQ/L (ref 8–16)
BACTERIA: ABNORMAL
BILIRUB UR QL STRIP: NEGATIVE
BUN SERPL-MCNC: 32 MG/DL (ref 7–22)
CALCIUM SERPL-MCNC: 9.2 MG/DL (ref 8.5–10.5)
CASTS #/AREA URNS LPF: ABNORMAL /LPF
CASTS #/AREA URNS LPF: ABNORMAL /LPF
CHARACTER UR: CLEAR
CHARCOAL URNS QL MICRO: ABNORMAL
CHLORIDE 24H UR-SRATE: 74 MEQ/L
CHLORIDE SERPL-SCNC: 106 MEQ/L (ref 98–111)
CO2 SERPL-SCNC: 24 MEQ/L (ref 23–33)
COLOR UR: YELLOW
CREAT SERPL-MCNC: 1.4 MG/DL (ref 0.4–1.2)
CRYSTALS URNS QL MICRO: ABNORMAL
EPITHELIAL CELLS, UA: ABNORMAL /HPF
GFR SERPL CREATININE-BSD FRML MDRD: 40 ML/MIN/1.73M2
GLUCOSE SERPL-MCNC: 107 MG/DL (ref 70–108)
GLUCOSE UR QL STRIP.AUTO: NEGATIVE MG/DL
HGB UR QL STRIP.AUTO: ABNORMAL
KETONES UR QL STRIP.AUTO: NEGATIVE
LEUKOCYTE ESTERASE UR QL STRIP.AUTO: NEGATIVE
NITRITE UR QL STRIP.AUTO: NEGATIVE
PH UR STRIP.AUTO: 6 [PH] (ref 5–9)
POTASSIUM SERPL-SCNC: 5.3 MEQ/L (ref 3.5–5.2)
POTASSIUM UR-SCNC: 83.6 MEQ/L
PROT UR STRIP.AUTO-MCNC: NEGATIVE MG/DL
RBC #/AREA URNS HPF: ABNORMAL /HPF
RENAL EPI CELLS #/AREA URNS HPF: ABNORMAL /[HPF]
SODIUM SERPL-SCNC: 141 MEQ/L (ref 135–145)
SODIUM UR-SCNC: 54 MEQ/L
SPECIFIC GRAVITY UA: 1.02 (ref 1–1.03)
UROBILINOGEN, URINE: 0.2 EU/DL (ref 0–1)
WBC #/AREA URNS HPF: ABNORMAL /HPF
YEAST LIKE FUNGI URNS QL MICRO: ABNORMAL

## 2024-08-13 NOTE — PROGRESS NOTES
hearing loss     COPD (chronic obstructive pulmonary disease) ([facility])     Factor V Leiden ([facility])     Gout     History of DVT (deep vein thrombosis)     multiple    History of pulmonary embolism     History of stasis dermatitis     Dr. Kathy Hui, given triamcinolone 40 mg IM, triamcinolone cream BID prn and hydroxyzine 1-2 tab at bedtime    Osteopenia     low FRAX score    PONV (postoperative nausea and vomiting)     Stage 3a chronic kidney disease ([facility])        Past Surgical History:   Procedure Laterality Date    CT BIOPSY RENAL  04/06/2022    CT BIOPSY RENAL 4/6/2022 [facility] CT SCAN    SHOULDER SURGERY Bilateral     rotator cuff    TYMPANOPLASTY Right 6/5/2024    Right Tympanoplasty performed by Patrick Barrios MD at [facility] OR       Current Outpatient Medications   Medication Sig Dispense Refill    tiotropium (SPIRIVA RESPIMAT) 2.5 MCG/ACT AERS inhaler Inhale 2 puffs into the lungs daily 1 each 2    apixaban (ELIQUIS) 5 MG TABS tablet Take 1 tablet by mouth 2 times daily 180 tablet 1    sertraline (ZOLOFT) 50 MG tablet Take 1 tablet by mouth daily 90 tablet 1    allopurinol (ZYLOPRIM) 100 MG tablet Take 1 tablet by mouth daily 90 tablet 1    losartan (COZAAR) 100 MG tablet Take 1 tablet by mouth daily 90 tablet 1    furosemide (LASIX) 20 MG tablet Take 1 tablet by mouth daily 90 tablet 1    albuterol sulfate HFA (VENTOLIN HFA) 108 (90 Base) MCG/ACT inhaler Inhale 2 puffs into the lungs 4 times daily as needed for Wheezing 18 g 0     No current facility-administered medications for this visit.       Allergies   Allergen Reactions    Penicillins Rash    Other Other (See Comments)     Cotton wood       Social History     Socioeconomic History    Marital status:      Spouse name: Not on file    Number of children: Not on file    Years of education: Not on file    Highest education level: Not on file   Occupational History    Not on file   Tobacco Use    Smoking status: Never     Passive exposure: Never

## 2024-08-14 ENCOUNTER — OFFICE VISIT (OUTPATIENT)
Dept: INTERNAL MEDICINE CLINIC | Age: 69
End: 2024-08-14
Payer: MEDICARE

## 2024-08-14 VITALS
WEIGHT: 191.8 LBS | BODY MASS INDEX: 26.85 KG/M2 | HEART RATE: 77 BPM | DIASTOLIC BLOOD PRESSURE: 74 MMHG | HEIGHT: 71 IN | SYSTOLIC BLOOD PRESSURE: 122 MMHG | OXYGEN SATURATION: 98 %

## 2024-08-14 DIAGNOSIS — R05.9 COUGH, UNSPECIFIED TYPE: Primary | ICD-10-CM

## 2024-08-14 DIAGNOSIS — J44.9 CHRONIC OBSTRUCTIVE PULMONARY DISEASE, UNSPECIFIED COPD TYPE (HCC): ICD-10-CM

## 2024-08-14 DIAGNOSIS — R42 DIZZINESS: ICD-10-CM

## 2024-08-14 DIAGNOSIS — E87.5 HYPERKALEMIA: ICD-10-CM

## 2024-08-14 PROCEDURE — G8417 CALC BMI ABV UP PARAM F/U: HCPCS

## 2024-08-14 PROCEDURE — G8399 PT W/DXA RESULTS DOCUMENT: HCPCS

## 2024-08-14 PROCEDURE — 3017F COLORECTAL CA SCREEN DOC REV: CPT

## 2024-08-14 PROCEDURE — G8427 DOCREV CUR MEDS BY ELIG CLIN: HCPCS

## 2024-08-14 PROCEDURE — 1036F TOBACCO NON-USER: CPT

## 2024-08-14 PROCEDURE — 99214 OFFICE O/P EST MOD 30 MIN: CPT

## 2024-08-14 PROCEDURE — 1123F ACP DISCUSS/DSCN MKR DOCD: CPT

## 2024-08-14 PROCEDURE — 1090F PRES/ABSN URINE INCON ASSESS: CPT

## 2024-08-14 PROCEDURE — 3023F SPIROM DOC REV: CPT

## 2024-08-14 RX ORDER — PANTOPRAZOLE SODIUM 20 MG/1
40 TABLET, DELAYED RELEASE ORAL DAILY
Qty: 30 TABLET | Refills: 3 | Status: CANCELLED | OUTPATIENT
Start: 2024-08-14

## 2024-08-14 SDOH — ECONOMIC STABILITY: FOOD INSECURITY: WITHIN THE PAST 12 MONTHS, YOU WORRIED THAT YOUR FOOD WOULD RUN OUT BEFORE YOU GOT MONEY TO BUY MORE.: NEVER TRUE

## 2024-08-14 SDOH — ECONOMIC STABILITY: INCOME INSECURITY: HOW HARD IS IT FOR YOU TO PAY FOR THE VERY BASICS LIKE FOOD, HOUSING, MEDICAL CARE, AND HEATING?: NOT HARD AT ALL

## 2024-08-14 SDOH — ECONOMIC STABILITY: FOOD INSECURITY: WITHIN THE PAST 12 MONTHS, THE FOOD YOU BOUGHT JUST DIDN'T LAST AND YOU DIDN'T HAVE MONEY TO GET MORE.: NEVER TRUE

## 2024-08-14 NOTE — PATIENT INSTRUCTIONS
Continue using Spiriva Respimat inhaler and prn albuterol inhaler.  Obtain labs 1-2 weeks prior to the next appointment.    Please contact our clinic if symptoms worsen or taking albuterol inhaler daily or >3 times weekly.    Return to clinic in 3 months for re-evaluation.

## 2024-08-20 ENCOUNTER — HOSPITAL ENCOUNTER (OUTPATIENT)
Dept: AUDIOLOGY | Age: 69
Discharge: HOME | End: 2024-08-20
Payer: MEDICARE

## 2024-08-20 PROCEDURE — 92557 COMPREHENSIVE HEARING TEST: CPT | Performed by: AUDIOLOGIST

## 2024-08-20 PROCEDURE — 92567 TYMPANOMETRY: CPT | Performed by: AUDIOLOGIST

## 2024-08-20 NOTE — PROGRESS NOTES
Tympanometry revealed normal middle ear compliance, middle ear pressure and middle ear volume in each ear indicating normal middle ear function, bilaterally.      RECOMMENDATION(S):   1- Follow up with Dr. Barrios today regarding these results and any further testing or treatment recommendations.  2- Repeat testing in 6-12 months or following any medical management  3- Consider binaural amplification pending medical clearance and patient motivation. Patient advised to contact insurance carrier for in-network hearing aid services providers.   4- Hearing protection in noise.      PREVIOUS AUDIOGRAMS:      03/24/2023 08/11/2022

## 2024-09-05 DIAGNOSIS — M1A.9XX0 CHRONIC GOUT WITHOUT TOPHUS, UNSPECIFIED CAUSE, UNSPECIFIED SITE: ICD-10-CM

## 2024-09-05 DIAGNOSIS — Z86.718 HISTORY OF DVT OF LOWER EXTREMITY: ICD-10-CM

## 2024-09-05 DIAGNOSIS — D68.51 FACTOR V LEIDEN (HCC): ICD-10-CM

## 2024-09-05 DIAGNOSIS — F32.A ANXIETY AND DEPRESSION: ICD-10-CM

## 2024-09-05 DIAGNOSIS — F41.9 ANXIETY AND DEPRESSION: ICD-10-CM

## 2024-09-06 RX ORDER — ALLOPURINOL 100 MG/1
100 TABLET ORAL DAILY
Qty: 90 TABLET | Refills: 1 | Status: SHIPPED | OUTPATIENT
Start: 2024-09-06

## 2024-09-10 ENCOUNTER — OFFICE VISIT (OUTPATIENT)
Dept: ENT CLINIC | Age: 69
End: 2024-09-10

## 2024-09-10 VITALS
SYSTOLIC BLOOD PRESSURE: 168 MMHG | TEMPERATURE: 97.2 F | HEART RATE: 70 BPM | OXYGEN SATURATION: 99 % | DIASTOLIC BLOOD PRESSURE: 80 MMHG | HEIGHT: 71 IN | RESPIRATION RATE: 18 BRPM | BODY MASS INDEX: 27.2 KG/M2 | WEIGHT: 194.3 LBS

## 2024-09-10 DIAGNOSIS — Z98.890 STATUS POST TYMPANOPLASTY: Primary | ICD-10-CM

## 2024-09-10 DIAGNOSIS — D68.51 FACTOR V LEIDEN (HCC): ICD-10-CM

## 2024-09-10 PROCEDURE — 99024 POSTOP FOLLOW-UP VISIT: CPT | Performed by: OTOLARYNGOLOGY

## 2024-10-04 ENCOUNTER — TELEPHONE (OUTPATIENT)
Dept: PHARMACY | Facility: CLINIC | Age: 69
End: 2024-10-04

## 2024-10-04 NOTE — TELEPHONE ENCOUNTER
[facility] CLINICAL PHARMACY: ADHERENCE REVIEW  Identified care gap per [employer]: fills at [facility]: ACE/ARB adherence      ASSESSMENT    ACE/ARB ADHERENCE    Insurance Records claims through  2024  (Prior Year PDC = 100% - PASSED ; YTD PDC = 75%; Potential Fail Date: 10/08/2024):   LOSARTAN POT TAB 100MG  last filled on 2024 for 90 day supply. Next refill due: 2024    Prescribed si tablet/capsule daily    Per Insurer Portal: last filled on 2024 for 90 day supply.     Per [facility] ([facility] closed): last picked up on 2024 for 90 day supply. will get  90 day supply ready to  since past due. Will be ready ready for  [place] 10/06/2024 after 11 am    BP Readings from Last 3 Encounters:   09/10/24 (!) 168/80   24 122/74   24 114/60     Estimated Creatinine Clearance: 45 mL/min (A) (based on SCr of 1.4 mg/dL (H)).  Lab Results   Component Value Date    CREATININE 1.4 (H) 2024     Lab Results   Component Value Date    K 5.3 (H) 2024       PLAN  Per insurer report, LIS-1 - may be able to receive up to a 100-day supply for the same cost as a 30-day supply.      The following are interventions that have been identified:   Patient OVERDUE refilling LOSARTAN POT TAB 100MG  and active on home medication list.   Refill/s of LOSARTAN POT TAB 100MG will be READY to  at patient's [facility]'s  pharmacy on Jarrett 10/06/2024 after 11 am      Attempting to reach patient to review.  Left message asking for return call. Letter sent to patient.      Last Visit: 2024  Next Visit: 2024      Adelina Serrano MA  [facility]   [facility]  [phone] Option 1     For Pharmacy Admin Tracking Only    Program: [facility] [employer]  CPA in place:  No  Recommendation Provided To: Pharmacy: 1  Intervention Detail: Adherence Monitorin  Intervention Accepted By: Pharmacy:

## 2024-11-06 ENCOUNTER — LAB (OUTPATIENT)
Dept: LAB | Age: 69
End: 2024-11-06

## 2024-11-06 DIAGNOSIS — E87.5 HYPERKALEMIA: ICD-10-CM

## 2024-11-06 LAB
ANION GAP SERPL CALC-SCNC: 14 MEQ/L (ref 8–16)
BUN SERPL-MCNC: 33 MG/DL (ref 7–22)
CALCIUM SERPL-MCNC: 9.6 MG/DL (ref 8.5–10.5)
CHLORIDE SERPL-SCNC: 109 MEQ/L (ref 98–111)
CO2 SERPL-SCNC: 18 MEQ/L (ref 23–33)
CREAT SERPL-MCNC: 1.4 MG/DL (ref 0.4–1.2)
GFR SERPL CREATININE-BSD FRML MDRD: 40 ML/MIN/1.73M2
GLUCOSE SERPL-MCNC: 121 MG/DL (ref 70–108)
MAGNESIUM SERPL-MCNC: 2 MG/DL (ref 1.6–2.4)
POTASSIUM SERPL-SCNC: 4.7 MEQ/L (ref 3.5–5.2)
SODIUM SERPL-SCNC: 141 MEQ/L (ref 135–145)

## 2024-11-12 ENCOUNTER — OFFICE VISIT (OUTPATIENT)
Dept: INTERNAL MEDICINE CLINIC | Age: 69
End: 2024-11-12

## 2024-11-12 VITALS
HEIGHT: 71 IN | HEART RATE: 68 BPM | BODY MASS INDEX: 26.1 KG/M2 | WEIGHT: 186.4 LBS | OXYGEN SATURATION: 97 % | DIASTOLIC BLOOD PRESSURE: 66 MMHG | SYSTOLIC BLOOD PRESSURE: 134 MMHG

## 2024-11-12 DIAGNOSIS — J44.9 MILD CHRONIC OBSTRUCTIVE PULMONARY DISEASE (HCC): Primary | ICD-10-CM

## 2024-11-12 DIAGNOSIS — D68.51 FACTOR V LEIDEN (HCC): ICD-10-CM

## 2024-11-12 DIAGNOSIS — Z12.11 COLON CANCER SCREENING: ICD-10-CM

## 2024-11-12 NOTE — PROGRESS NOTES
1955    Chief Complaint   Patient presents with    Hypertension    Chronic Kidney Disease    Gout    Anxiety       HPI    Carolynn Wynn is a 69 y.o.f with PMHx of mild COPD, Factor V Ledie/ Hx of DVT, 3b CKD, and BL hearing loss who is here for a f/u visit.     Patient was previously in clinic for subacute cough following an ENT procedure in June.  It was initially nonpainful, nonproductive.  She noted some VILLAFUERTE that was mild.  She was previously on an ACE inhibitor which was switched to an ARB.  CXR obtained at the end of July showed no acute intrathoracic process, VQ scan also done in July was negative for PE.  She continues on Spiriva and albuterol which is rarely used and her cough has resolved at this time.    She denies SOB, cough, CP, new onset MSK pain, abdominal changes, changes in bowel or urinary habits.    Cough (resolved)  Mild COPD:   PFT 2/2024: FEV1/FVC: 64%.   CXR 7/2024: No acute intrathoracic process.   - Continue Spiriva daily puff + Albuterol PRN rarely used  - Pulmonary rehab refferal.    HTN:   BP in office: 134/66.   - Continue Cozaar 100mg daily and prn lasix 20mg (rarely used).    CKD 3b, stable:   Bx 4/2022: glomerulomegaly w/ thin basement membrane disease.   Follows OP w/ Dr. Pathak, Nephro.   Cr 1.4 eGFR: 40 (11/6/2024), suspect new baseline 1.2-1.5; stable but progressing.   - Okay to resume PRN lasix at this time for edema.   - Continue Cozaar 100mg daily for proteinuria.   - Continue OP f/u w/ nephrology.    Factor V Leiden / Hx Recurrent VTE/PE:   [facility].   V/Q scan negative for PE 7/31/2024.   - Continue Eliquis 5mg BID.     Gout: Frequent flares. Continue Allopurinol 100mg daily.    Anxiety: No SI/HI. Continue Zoloft 50mg daily.      Routine Screening:   Mammogram: 8/2024: No mammographic evidence of malignancy. A 1 year screening mammogram is  recommended.  Colonoscopy: Send [facility] again.       Past Medical History:   Diagnosis Date     Patient

## 2024-11-22 DIAGNOSIS — R05.9 COUGH, UNSPECIFIED TYPE: ICD-10-CM

## 2024-11-29 RX ORDER — TIOTROPIUM BROMIDE INHALATION SPRAY 3.12 UG/1
2 SPRAY, METERED RESPIRATORY (INHALATION) DAILY
Qty: 4 G | Refills: 5 | Status: SHIPPED | OUTPATIENT
Start: 2024-11-29

## 2024-12-03 ENCOUNTER — TELEPHONE (OUTPATIENT)
Dept: CARDIAC REHAB | Age: 69
End: 2024-12-03

## 2024-12-03 NOTE — TELEPHONE ENCOUNTER
PULMONARY REHABILITATION       PULMONARY REHAB: PATIENT NOT ENROLLED       Patient Name: Carolynn Wynn   Patient YOB: 1955      Referring Provider: Dr Masterson  Diagnosis: Mild COPD  Date Patient Referred: 11/12/24      Thank you for referring your patient to our Pulmonary Rehab program. At this time, the referred patient is currently not enrolled in our program for the following reason(s):    [x]  Patient is not interested    []  PFT does not qualify patient for Pulmonary Rehab/ RTR     []  Attempted to call patient, call has not been returned    []  Insurance Issue(s) (i.e. lack of coverage, copay, etc)    []  Other:       Note:      Thank you for your continued support of our Pulmonary Rehab program. If the patient would like to attend rehab in the future, please send a new referral. Please call us at [phone] if you have any questions or concerns.      Selena Hooper, RRT, CPFT, CTTS 12/3/2024     Pulmonary Rehab Staff Signature

## 2024-12-10 ENCOUNTER — LAB (OUTPATIENT)
Dept: LAB | Age: 69
End: 2024-12-10

## 2024-12-10 DIAGNOSIS — N18.32 STAGE 3B CHRONIC KIDNEY DISEASE (HCC): ICD-10-CM

## 2024-12-10 LAB
ANION GAP SERPL CALC-SCNC: 11 MEQ/L (ref 8–16)
BUN SERPL-MCNC: 30 MG/DL (ref 7–22)
CALCIUM SERPL-MCNC: 9.4 MG/DL (ref 8.5–10.5)
CHLORIDE SERPL-SCNC: 107 MEQ/L (ref 98–111)
CO2 SERPL-SCNC: 24 MEQ/L (ref 23–33)
CREAT SERPL-MCNC: 1.2 MG/DL (ref 0.4–1.2)
GFR SERPL CREATININE-BSD FRML MDRD: 48 ML/MIN/1.73M2
GLUCOSE SERPL-MCNC: 79 MG/DL (ref 70–108)
POTASSIUM SERPL-SCNC: 4.9 MEQ/L (ref 3.5–5.2)
SODIUM SERPL-SCNC: 142 MEQ/L (ref 135–145)

## 2024-12-13 LAB — NONINV COLON CA DNA+OCC BLD SCRN STL QL: NEGATIVE

## 2024-12-18 ENCOUNTER — OFFICE VISIT (OUTPATIENT)
Dept: NEPHROLOGY | Age: 69
End: 2024-12-18
Payer: MEDICARE

## 2024-12-18 VITALS
OXYGEN SATURATION: 98 % | WEIGHT: 187 LBS | SYSTOLIC BLOOD PRESSURE: 122 MMHG | BODY MASS INDEX: 26.45 KG/M2 | DIASTOLIC BLOOD PRESSURE: 74 MMHG | HEART RATE: 84 BPM

## 2024-12-18 DIAGNOSIS — N18.31 STAGE 3A CHRONIC KIDNEY DISEASE (HCC): Primary | ICD-10-CM

## 2024-12-18 PROCEDURE — 1159F MED LIST DOCD IN RCRD: CPT | Performed by: INTERNAL MEDICINE

## 2024-12-18 PROCEDURE — 1036F TOBACCO NON-USER: CPT | Performed by: INTERNAL MEDICINE

## 2024-12-18 PROCEDURE — 1123F ACP DISCUSS/DSCN MKR DOCD: CPT | Performed by: INTERNAL MEDICINE

## 2024-12-18 PROCEDURE — G8427 DOCREV CUR MEDS BY ELIG CLIN: HCPCS | Performed by: INTERNAL MEDICINE

## 2024-12-18 PROCEDURE — G8484 FLU IMMUNIZE NO ADMIN: HCPCS | Performed by: INTERNAL MEDICINE

## 2024-12-18 PROCEDURE — G8417 CALC BMI ABV UP PARAM F/U: HCPCS | Performed by: INTERNAL MEDICINE

## 2024-12-18 PROCEDURE — 1090F PRES/ABSN URINE INCON ASSESS: CPT | Performed by: INTERNAL MEDICINE

## 2024-12-18 PROCEDURE — G8399 PT W/DXA RESULTS DOCUMENT: HCPCS | Performed by: INTERNAL MEDICINE

## 2024-12-18 PROCEDURE — 3017F COLORECTAL CA SCREEN DOC REV: CPT | Performed by: INTERNAL MEDICINE

## 2024-12-18 PROCEDURE — 99214 OFFICE O/P EST MOD 30 MIN: CPT | Performed by: INTERNAL MEDICINE

## 2024-12-18 NOTE — PROGRESS NOTES
[facility] [place] [facility]  [facility] KIDNEY AND HYPERTENS[facility]  [address]  [address]  [place] [address]  Dept: [phone]  Loc: [phone]  Progress Note  12/18/2024 1:06 PM      Pt Name:    Carolynn Wynn  YOB: 1955  Primary Care Physician:  Nyasia Pena MD     Chief Complaint:   Chief Complaint   Patient presents with    Follow-up     CKD III        History of Present Illness:   This is a follow-up visit for CKD III, HTN. She has hx of DVT/PE , factor V Leidin.   Serum creatinine 1.0-1.2 mg/dL.      Work up revealed proteinuria 730 mg and microscopic hematuria with + KSENIA, 1:160. 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    She is doing well. Bp controlled with losartan. No leg swelling which she developed with amlodipine.         Pertinent items are noted in HPI.         Past History:  Past Medical History:   Diagnosis Date    Bilateral hearing loss     COPD (chronic obstructive pulmonary disease) (HCC)     Factor V Leiden (HCC)     Gout     History of DVT (deep vein thrombosis)     multiple    History of pulmonary embolism     History of stasis dermatitis     Dr. Chavez office - Ez, given triamcinolone 40 mg IM, triamcinolone cream BID prn and hydroxyzine 1-2 tab at bedtime    Osteopenia     low FRAX score    PONV (postoperative nausea and vomiting)     Stage 3a chronic kidney disease (HCC)      Past Surgical History:   Procedure Laterality Date    CT BIOPSY RENAL  04/06/2022    CT BIOPSY RENAL 4/6/2022 [facility] CT SCAN    SHOULDER SURGERY Bilateral     rotator cuff    TYMPANOPLASTY Right 6/5/2024    Right Tympanoplasty performed by Patrick Barrios MD at [facility] OR        VITALS:  /74 (Site: Left Upper Arm, Position: Sitting, Cuff Size: Large Adult)   Pulse 84   Wt 84.8 kg (187 lb)   SpO2 98%   BMI 26.45 kg/m²   Wt Readings from Last 3 Encounters:   12/18/24 84.8 kg (187 lb)   11/12/24 84.6 kg

## 2025-02-07 NOTE — TELEPHONE ENCOUNTER
Notification via portal   Your urine testing is essentially normal. Trace protein in your urine is not concerning  unless it persists and/ or  worsens.  We should recheck this in 6 weeks to 3 months. It would be best if the repeat testing is the first urine of the day as exercise can cause protein in the urine.  Your urine is also very concentrated, showing that you need to drink more water.  Patient last seen in clinic 2/27/2023. Recommend follow up appointment to review perioperative recommendations. Will forward to scheduling staff.

## 2025-02-20 ENCOUNTER — OFFICE VISIT (OUTPATIENT)
Dept: INTERNAL MEDICINE CLINIC | Age: 70
End: 2025-02-20

## 2025-02-20 VITALS
HEART RATE: 68 BPM | HEIGHT: 71 IN | SYSTOLIC BLOOD PRESSURE: 140 MMHG | DIASTOLIC BLOOD PRESSURE: 86 MMHG | TEMPERATURE: 98.1 F | BODY MASS INDEX: 27.1 KG/M2 | WEIGHT: 193.6 LBS

## 2025-02-20 DIAGNOSIS — E78.00 HYPERCHOLESTEROLEMIA: ICD-10-CM

## 2025-02-20 DIAGNOSIS — F41.9 ANXIETY AND DEPRESSION: ICD-10-CM

## 2025-02-20 DIAGNOSIS — M1A.9XX0 CHRONIC GOUT WITHOUT TOPHUS, UNSPECIFIED CAUSE, UNSPECIFIED SITE: ICD-10-CM

## 2025-02-20 DIAGNOSIS — Z86.711 HISTORY OF PULMONARY EMBOLISM: ICD-10-CM

## 2025-02-20 DIAGNOSIS — Z86.718 HISTORY OF DVT (DEEP VEIN THROMBOSIS): ICD-10-CM

## 2025-02-20 DIAGNOSIS — Z01.818 PRE-OP EXAM: Primary | ICD-10-CM

## 2025-02-20 DIAGNOSIS — I10 HYPERTENSION, UNSPECIFIED TYPE: ICD-10-CM

## 2025-02-20 DIAGNOSIS — J44.9 MILD CHRONIC OBSTRUCTIVE PULMONARY DISEASE (HCC): ICD-10-CM

## 2025-02-20 DIAGNOSIS — F32.A ANXIETY AND DEPRESSION: ICD-10-CM

## 2025-02-20 DIAGNOSIS — M65.331 TRIGGER MIDDLE FINGER OF RIGHT HAND: ICD-10-CM

## 2025-02-20 DIAGNOSIS — N18.31 CHRONIC KIDNEY DISEASE, STAGE 3A (HCC): ICD-10-CM

## 2025-02-20 RX ORDER — LOSARTAN POTASSIUM 100 MG/1
100 TABLET ORAL DAILY
Qty: 90 TABLET | Refills: 1 | Status: SHIPPED | OUTPATIENT
Start: 2025-02-20

## 2025-02-20 RX ORDER — ALLOPURINOL 100 MG/1
100 TABLET ORAL DAILY
Qty: 90 TABLET | Refills: 1 | Status: SHIPPED | OUTPATIENT
Start: 2025-02-20

## 2025-02-20 SDOH — ECONOMIC STABILITY: FOOD INSECURITY: WITHIN THE PAST 12 MONTHS, YOU WORRIED THAT YOUR FOOD WOULD RUN OUT BEFORE YOU GOT MONEY TO BUY MORE.: NEVER TRUE

## 2025-02-20 SDOH — ECONOMIC STABILITY: FOOD INSECURITY: WITHIN THE PAST 12 MONTHS, THE FOOD YOU BOUGHT JUST DIDN'T LAST AND YOU DIDN'T HAVE MONEY TO GET MORE.: NEVER TRUE

## 2025-02-20 ASSESSMENT — PATIENT HEALTH QUESTIONNAIRE - PHQ9
SUM OF ALL RESPONSES TO PHQ QUESTIONS 1-9: 0
9. THOUGHTS THAT YOU WOULD BE BETTER OFF DEAD, OR OF HURTING YOURSELF: NOT AT ALL
4. FEELING TIRED OR HAVING LITTLE ENERGY: NOT AT ALL
SUM OF ALL RESPONSES TO PHQ QUESTIONS 1-9: 0
3. TROUBLE FALLING OR STAYING ASLEEP: NOT AT ALL
SUM OF ALL RESPONSES TO PHQ QUESTIONS 1-9: 0
5. POOR APPETITE OR OVEREATING: NOT AT ALL
10. IF YOU CHECKED OFF ANY PROBLEMS, HOW DIFFICULT HAVE THESE PROBLEMS MADE IT FOR YOU TO DO YOUR WORK, TAKE CARE OF THINGS AT HOME, OR GET ALONG WITH OTHER PEOPLE: NOT DIFFICULT AT ALL
SUM OF ALL RESPONSES TO PHQ QUESTIONS 1-9: 0
7. TROUBLE CONCENTRATING ON THINGS, SUCH AS READING THE NEWSPAPER OR WATCHING TELEVISION: NOT AT ALL
6. FEELING BAD ABOUT YOURSELF - OR THAT YOU ARE A FAILURE OR HAVE LET YOURSELF OR YOUR FAMILY DOWN: NOT AT ALL
2. FEELING DOWN, DEPRESSED OR HOPELESS: NOT AT ALL
8. MOVING OR SPEAKING SO SLOWLY THAT OTHER PEOPLE COULD HAVE NOTICED. OR THE OPPOSITE, BEING SO FIGETY OR RESTLESS THAT YOU HAVE BEEN MOVING AROUND A LOT MORE THAN USUAL: NOT AT ALL
1. LITTLE INTEREST OR PLEASURE IN DOING THINGS: NOT AT ALL
SUM OF ALL RESPONSES TO PHQ9 QUESTIONS 1 & 2: 0

## 2025-02-20 NOTE — PROGRESS NOTES
Vital Sign     Worried About Running Out of Food in the Last Year: Never true     Ran Out of Food in the Last Year: Never true   Transportation Needs: No Transportation Needs (2/20/2025)    PRAPARE - Transportation     Lack of Transportation (Medical): No     Lack of Transportation (Non-Medical): No   Physical Activity: Inactive (8/18/2022)    Exercise Vital Sign     Days of Exercise per Week: 0 days     Minutes of Exercise per Session: 0 min   Stress: Not on file   Social Connections: Not on file   Intimate Partner Violence: Not on file   Housing Stability: Low Risk  (2/20/2025)    Housing Stability Vital Sign     Unable to Pay for Housing in the Last Year: No     Number of Times Moved in the Last Year: 0     Homeless in the Last Year: No       Family History   Problem Relation Age of Onset    Deep Vein Thrombosis Mother     Coronary Art Dis Father     GERD Father     Other Father 70        ESRD, hearing los    Other Sister 45        ESRD    GERD Sister     Pulmonary Embolism Sister     Hearing Loss Sister     Cancer Brother         lung, kidney    Hearing Loss Brother     Hearing Loss Brother        Review of Systems - General ROS: negative for - chills or fever  Psychological ROS: negative for - anxiety and depression  Hematological and Lymphatic ROS: No history of blood clots or bleeding disorder.   Respiratory ROS: no cough, shortness of breath, or wheezing  Cardiovascular ROS: no chest pain or dyspnea on exertion, tolerates a flight of stairs, vacuuming  Gastrointestinal ROS: no abdominal pain, change in bowel habits, or black or bloody stools  Genito-Urinary ROS: no dysuria, trouble voiding, or hematuria  Musculoskeletal ROS: negative for - muscle pain or muscular weakness, positive right middle finger pain  Neurological ROS: negative for - headaches, numbness/tingling, seizures or weakness  Dermatological ROS: negative for - rash or skin lesion changes    Blood pressure (!) 140/86, pulse 68, temperature 98.1

## 2025-02-28 ENCOUNTER — LAB (OUTPATIENT)
Dept: LAB | Age: 70
End: 2025-02-28

## 2025-02-28 LAB
ANION GAP SERPL CALC-SCNC: 10 MEQ/L (ref 8–16)
BASOPHILS ABSOLUTE: 0 THOU/MM3 (ref 0–0.1)
BASOPHILS NFR BLD AUTO: 0.2 %
BUN SERPL-MCNC: 38 MG/DL (ref 8–23)
CALCIUM SERPL-MCNC: 9.3 MG/DL (ref 8.8–10.2)
CHLORIDE SERPL-SCNC: 104 MEQ/L (ref 98–111)
CO2 SERPL-SCNC: 24 MEQ/L (ref 22–29)
CREAT SERPL-MCNC: 1.3 MG/DL (ref 0.5–0.9)
DEPRECATED RDW RBC AUTO: 49.8 FL (ref 35–45)
EOSINOPHIL NFR BLD AUTO: 3 %
EOSINOPHILS ABSOLUTE: 0.3 THOU/MM3 (ref 0–0.4)
ERYTHROCYTE [DISTWIDTH] IN BLOOD BY AUTOMATED COUNT: 14.5 % (ref 11.5–14.5)
GFR SERPL CREATININE-BSD FRML MDRD: 44 ML/MIN/1.73M2
GLUCOSE SERPL-MCNC: 95 MG/DL (ref 74–109)
HCT VFR BLD AUTO: 40.5 % (ref 37–47)
HGB BLD-MCNC: 13.4 GM/DL (ref 12–16)
IMM GRANULOCYTES # BLD AUTO: 0.03 THOU/MM3 (ref 0–0.07)
IMM GRANULOCYTES NFR BLD AUTO: 0.4 %
LYMPHOCYTES ABSOLUTE: 1.6 THOU/MM3 (ref 1–4.8)
LYMPHOCYTES NFR BLD AUTO: 19.6 %
MCH RBC QN AUTO: 30.9 PG (ref 26–33)
MCHC RBC AUTO-ENTMCNC: 33.1 GM/DL (ref 32.2–35.5)
MCV RBC AUTO: 93.5 FL (ref 81–99)
MONOCYTES ABSOLUTE: 0.8 THOU/MM3 (ref 0.4–1.3)
MONOCYTES NFR BLD AUTO: 9.7 %
NEUTROPHILS ABSOLUTE: 5.6 THOU/MM3 (ref 1.8–7.7)
NEUTROPHILS NFR BLD AUTO: 67.1 %
NRBC BLD AUTO-RTO: 0 /100 WBC
PLATELET # BLD AUTO: 305 THOU/MM3 (ref 130–400)
PMV BLD AUTO: 9.7 FL (ref 9.4–12.4)
POTASSIUM SERPL-SCNC: 5.1 MEQ/L (ref 3.5–5.2)
RBC # BLD AUTO: 4.33 MILL/MM3 (ref 4.2–5.4)
SODIUM SERPL-SCNC: 138 MEQ/L (ref 135–145)
WBC # BLD AUTO: 8.4 THOU/MM3 (ref 4.8–10.8)

## 2025-03-03 ENCOUNTER — LAB (OUTPATIENT)
Dept: LAB | Age: 70
End: 2025-03-03

## 2025-03-03 DIAGNOSIS — E78.00 HYPERCHOLESTEROLEMIA: ICD-10-CM

## 2025-03-03 LAB
CHOLEST SERPL-MCNC: 303 MG/DL (ref 100–199)
HDLC SERPL-MCNC: 41 MG/DL
LDLC SERPL CALC-MCNC: 223 MG/DL
TRIGL SERPL-MCNC: 196 MG/DL (ref 0–199)

## 2025-03-08 ENCOUNTER — RESULTS FOLLOW-UP (OUTPATIENT)
Dept: INTERNAL MEDICINE CLINIC | Age: 70
End: 2025-03-08

## 2025-03-08 DIAGNOSIS — E78.00 HYPERCHOLESTEROLEMIA: Primary | ICD-10-CM

## 2025-03-11 NOTE — TELEPHONE ENCOUNTER
----- Message from Dr. Nyasia Pena MD sent at 3/8/2025 10:27 AM EST -----  Please call patient and let them know results show LDL > 190 - need statin, limit fatty/fried foods.  I did not see anything in my chart about statin intolerance.  Did she remember if issues with Lipitor or Crestor in the past?  If she doesn't remember - try Crestor 10 mg daily.  Repeat LDL and ALT in 2-3 months.

## 2025-03-11 NOTE — TELEPHONE ENCOUNTER
Patient informed of results and she did not tolerate Lipitor in the past but willing to try the Crestor , see refill encounter . Labs ordered and mailed to patient .

## 2025-03-12 RX ORDER — ROSUVASTATIN CALCIUM 10 MG/1
10 TABLET, COATED ORAL DAILY
Qty: 30 TABLET | Refills: 3 | OUTPATIENT
Start: 2025-03-12

## 2025-05-07 ENCOUNTER — LAB (OUTPATIENT)
Dept: LAB | Age: 70
End: 2025-05-07

## 2025-05-07 ENCOUNTER — RESULTS FOLLOW-UP (OUTPATIENT)
Dept: INTERNAL MEDICINE CLINIC | Age: 70
End: 2025-05-07

## 2025-05-07 DIAGNOSIS — E78.00 HYPERCHOLESTEROLEMIA: ICD-10-CM

## 2025-05-07 LAB
ALT SERPL W/O P-5'-P-CCNC: 18 U/L (ref 10–35)
LDLC SERPL DIRECT ASSAY-MCNC: 187 MG/DL

## 2025-05-08 RX ORDER — ROSUVASTATIN CALCIUM 20 MG/1
20 TABLET, COATED ORAL DAILY
COMMUNITY
End: 2025-05-08 | Stop reason: SDUPTHER

## 2025-05-08 RX ORDER — ROSUVASTATIN CALCIUM 20 MG/1
20 TABLET, COATED ORAL DAILY
Qty: 90 TABLET | Refills: 0 | OUTPATIENT
Start: 2025-05-08

## 2025-05-08 NOTE — TELEPHONE ENCOUNTER
----- Message from Dr. Nyasia Pena MD sent at 5/7/2025  8:28 PM EDT -----  Please call patient and let them know results show improvement but LDL still high - increase Crestor to 20 mg daily.

## 2025-05-08 NOTE — TELEPHONE ENCOUNTER
Patient informed of results and to increase the Crestor to 20 mg daily . See refill encounter 5/7/25.

## 2025-05-09 ENCOUNTER — HOSPITAL ENCOUNTER (EMERGENCY)
Age: 70
Discharge: HOME | End: 2025-05-09
Payer: MEDICARE

## 2025-05-09 VITALS
WEIGHT: 190 LBS | BODY MASS INDEX: 27.2 KG/M2 | OXYGEN SATURATION: 95 % | TEMPERATURE: 97.7 F | RESPIRATION RATE: 15 BRPM | SYSTOLIC BLOOD PRESSURE: 155 MMHG | DIASTOLIC BLOOD PRESSURE: 76 MMHG | HEART RATE: 71 BPM | HEIGHT: 70 IN

## 2025-05-09 DIAGNOSIS — H57.11 PAIN OF RIGHT EYE: ICD-10-CM

## 2025-05-09 DIAGNOSIS — H11.431 CONJUNCTIVAL HYPEREMIA OF RIGHT EYE: Primary | ICD-10-CM

## 2025-05-09 PROCEDURE — 99283 EMERGENCY DEPT VISIT LOW MDM: CPT

## 2025-05-09 RX ORDER — PROPARACAINE HYDROCHLORIDE 5 MG/ML
1 SOLUTION/ DROPS OPHTHALMIC ONCE
Status: DISCONTINUED | OUTPATIENT
Start: 2025-05-09 | End: 2025-05-09 | Stop reason: HOSPADM

## 2025-05-09 RX ORDER — OFLOXACIN 3 MG/ML
2 SOLUTION/ DROPS OPHTHALMIC 4 TIMES DAILY
Qty: 5 ML | Refills: 0 | Status: SHIPPED | OUTPATIENT
Start: 2025-05-09 | End: 2025-05-13

## 2025-05-09 RX ORDER — CIPROFLOXACIN HYDROCHLORIDE 3.5 MG/ML
1 SOLUTION/ DROPS TOPICAL 4 TIMES DAILY
Qty: 2.5 ML | Refills: 0 | Status: SHIPPED | OUTPATIENT
Start: 2025-05-09 | End: 2025-05-09

## 2025-05-09 ASSESSMENT — PAIN - FUNCTIONAL ASSESSMENT: PAIN_FUNCTIONAL_ASSESSMENT: 0-10

## 2025-05-09 ASSESSMENT — VISUAL ACUITY
OU: 20/25
OS: 20/40
OU: 1
OD: 20/25

## 2025-05-09 ASSESSMENT — PAIN SCALES - GENERAL: PAINLEVEL_OUTOF10: 8

## 2025-05-09 ASSESSMENT — PAIN DESCRIPTION - ORIENTATION: ORIENTATION: RIGHT

## 2025-05-09 ASSESSMENT — PAIN DESCRIPTION - LOCATION: LOCATION: EYE

## 2025-05-09 ASSESSMENT — TONOMETRY
OD_IOP_MMHG: 16
OS_IOP_MMHG: 16

## 2025-05-09 NOTE — DISCHARGE INSTRUCTIONS
Please call [facility] to follow-up with them as soon as possible.    Take your medication as indicated and prescribed.  If you are given an antibiotic, then make sure you get the prescription filled and take the antibiotics until finished.    For pain use acetaminophen (Tylenol), unless prescribed medications that have acetaminophen in it.  You can take over the counter acetaminophen tablets (1 - 2 tablets of the 500-mg strength every 6 hours).      PLEASE RETURN TO THE EMERGENCY DEPARTMENT IMMEDIATELY for worsening symptoms, swelling or drainage from the eye, the white of your eye turns red, inability to see, or if you develop any concerning symptoms such as: high fever not relieved by acetaminophen (Tylenol) and/or ibuprofen (Motrin / Advil), chills, shortness of breath, chest pain, feeling of your heart fluttering or racing, persistent nausea and/or vomiting, vomiting up blood, blood in your stool, numbness, loss of consciousness, weakness or tingling in the arms or legs or change in color of the extremities, changes in mental status, persistent headache, blurry vision, loss of bladder / bowel control, unable to follow up with your physician, or other any other care or concern.

## 2025-05-09 NOTE — ED TRIAGE NOTES
Presents to ED with complaints of right eye pain and drainage that has been worsening for the past 4 days. Pt report the symptoms have been worsening and she has noted a \"film\" on her eye. Reports she has been taking tylenol and applying warm compresses with minimal relief.

## 2025-05-09 NOTE — ED PROVIDER NOTES
[facility] EMERGENCY DEPARTMENT      EMERGENCY MEDICINE     Pt Name: Carolynn Wynn  MRN: [id number]  Birthdate 1955  Date of evaluation: 5/9/2025  Provider: Gifty Love PA-C    CHIEF COMPLAINT       Chief Complaint   Patient presents with    Conjunctivitis     HISTORY OF PRESENT ILLNESS   Carolynn Wynn is a pleasant 69 y.o. female who presents to the emergency department  from home, by private vehicle for evaluation of right eye pain and redness.  Patient states for the last 4 to 5 days she has noticed redness and irritation to the right eye.  Initially patient was feeling foreign body sensation, which has resolved.  Patient does not remember specific trauma or foreign body in eye.  Patient is now endorsing photophobia.  Patient denies vision changes including floaters.  Patient denies headache, fever, chest pain.     PASTMEDICAL HISTORY     Past Medical History:   Diagnosis Date    Bilateral hearing loss     COPD (chronic obstructive pulmonary disease) ([facility])     Factor V Leiden     Gout     History of DVT (deep vein thrombosis)     multiple    History of pulmonary embolism     History of stasis dermatitis     Dr. Kathy Hui, given triamcinolone 40 mg IM, triamcinolone cream BID prn and hydroxyzine 1-2 tab at bedtime    Osteopenia     low FRAX score    PONV (postoperative nausea and vomiting)     Stage 3a chronic kidney disease ([facility])        Patient Active Problem List   Diagnosis Code    Factor V Leiden D68.51    History of pulmonary embolism Z86.711    History of DVT (deep vein thrombosis) Z86.718    Chronic kidney disease, stage 3a ([facility]) N18.31    Chronic obstructive pulmonary disease ([facility]) J44.9    VILLAFUERTE (dyspnea on exertion) R06.09    Venous stasis dermatitis of both lower extremities I87.2    Perforation of right tympanic membrane H72.91    Mixed conductive and sensorineural hearing loss of right ear with restricted hearing of left ear H90.A31    Status post right  Capillary Refill: Capillary refill takes less than 2 seconds.   Neurological:      General: No focal deficit present.      Mental Status: She is alert.   Psychiatric:         Mood and Affect: Mood normal.         FORMAL DIAGNOSTIC RESULTS     RADIOLOGY: Interpretation per the Radiologist below, if available at the time of this note (none if blank):    No orders to display       LABS: (none if blank)  Labs Reviewed - No data to display    (Any cultures that may have been sent were not resulted at the time of this patient visit)    MEDICAL DECISION MAKING / ED COURSE:     1) Number and Complexity of Problems            Problem List This Visit:         Chief Complaint   Patient presents with    Conjunctivitis          Differential Diagnosis includes (but not limited to):  Allergic Conjunctivitis, Bacterial Conjunctivitis, Viral Conjunctivitis, Bacterial Endophthalmitis, Chalazion, Chemical Burn, Conjunctivitis, Acute Hemorrhagic, Contact Lens Complications, Corneal Foreign Body, Corneal Ulcer, Dacryocystitis, Corneal Abrasion, Glaucoma, Angle Closure, Acute, Herpes Zoster, Hordeolum, Orbital Cellulitis, Preseptal Cellulitis, Pterygium, Alberto-Juan José Syndrome, Trauma              Pertinent Comorbid Conditions:    Factor V Leiden    2)  Data Reviewed (none if left blank)          My Independent interpretations:     EKG:      None    Imaging: None    Labs:      None                 Decision Rules/Clinical Scores utilized:  None            External Documentation Reviewed:         Previous patient encounter documents & history available on EMR was reviewed See MDM             See Formal Diagnostic Results above for the lab and radiology tests and orders.    3)  Treatment and Disposition         ED Reassessment: Patient stable, pain improved with proparacaine         Case discussed with consulting clinician:  See MDM         Shared Decision-Making was performed and disposition discussed with the        Patient/Family and

## 2025-05-13 ENCOUNTER — OFFICE VISIT (OUTPATIENT)
Dept: INTERNAL MEDICINE CLINIC | Age: 70
End: 2025-05-13
Payer: MEDICARE

## 2025-05-13 VITALS
BODY MASS INDEX: 27.6 KG/M2 | DIASTOLIC BLOOD PRESSURE: 84 MMHG | HEIGHT: 70 IN | SYSTOLIC BLOOD PRESSURE: 148 MMHG | TEMPERATURE: 98.3 F | HEART RATE: 68 BPM | WEIGHT: 192.8 LBS

## 2025-05-13 DIAGNOSIS — F32.A ANXIETY AND DEPRESSION: ICD-10-CM

## 2025-05-13 DIAGNOSIS — M1A.9XX0 CHRONIC GOUT WITHOUT TOPHUS, UNSPECIFIED CAUSE, UNSPECIFIED SITE: ICD-10-CM

## 2025-05-13 DIAGNOSIS — H10.31 ACUTE CONJUNCTIVITIS OF RIGHT EYE, UNSPECIFIED ACUTE CONJUNCTIVITIS TYPE: Primary | ICD-10-CM

## 2025-05-13 DIAGNOSIS — D68.51 FACTOR V LEIDEN: ICD-10-CM

## 2025-05-13 DIAGNOSIS — N18.31 STAGE 3A CHRONIC KIDNEY DISEASE (HCC): ICD-10-CM

## 2025-05-13 DIAGNOSIS — F41.9 ANXIETY AND DEPRESSION: ICD-10-CM

## 2025-05-13 DIAGNOSIS — J44.9 CHRONIC OBSTRUCTIVE PULMONARY DISEASE, UNSPECIFIED COPD TYPE (HCC): ICD-10-CM

## 2025-05-13 DIAGNOSIS — I10 HYPERTENSION, UNSPECIFIED TYPE: ICD-10-CM

## 2025-05-13 PROCEDURE — 99213 OFFICE O/P EST LOW 20 MIN: CPT

## 2025-05-13 PROCEDURE — 1159F MED LIST DOCD IN RCRD: CPT

## 2025-05-13 PROCEDURE — 3023F SPIROM DOC REV: CPT

## 2025-05-13 PROCEDURE — 1036F TOBACCO NON-USER: CPT

## 2025-05-13 PROCEDURE — 1123F ACP DISCUSS/DSCN MKR DOCD: CPT

## 2025-05-13 PROCEDURE — G8399 PT W/DXA RESULTS DOCUMENT: HCPCS

## 2025-05-13 PROCEDURE — 3077F SYST BP >= 140 MM HG: CPT

## 2025-05-13 PROCEDURE — G8427 DOCREV CUR MEDS BY ELIG CLIN: HCPCS

## 2025-05-13 PROCEDURE — G8417 CALC BMI ABV UP PARAM F/U: HCPCS

## 2025-05-13 PROCEDURE — 3079F DIAST BP 80-89 MM HG: CPT

## 2025-05-13 PROCEDURE — 1090F PRES/ABSN URINE INCON ASSESS: CPT

## 2025-05-13 PROCEDURE — 3017F COLORECTAL CA SCREEN DOC REV: CPT

## 2025-05-13 RX ORDER — VITAMIN B COMPLEX
1 CAPSULE ORAL DAILY
COMMUNITY

## 2025-05-13 RX ORDER — LOSARTAN POTASSIUM 100 MG/1
100 TABLET ORAL DAILY
Qty: 90 TABLET | Refills: 1 | Status: SHIPPED | OUTPATIENT
Start: 2025-05-13

## 2025-05-13 RX ORDER — ALLOPURINOL 100 MG/1
100 TABLET ORAL DAILY
Qty: 90 TABLET | Refills: 1 | Status: SHIPPED | OUTPATIENT
Start: 2025-05-13

## 2025-05-13 NOTE — PROGRESS NOTES
1955    Chief Complaint   Patient presents with    Hypertension     6 months / labs completed    Chronic Kidney Disease    Gout    Anxiety    COPD    Factor 5 Leiden       Carolynn Wynn is a 69 y.o.f with PMHx of mild COPD, Factor V Ledie/ Hx of DVT, 3b CKD, and BL hearing loss who is here for a f/u visit.     Patient was recently seen at ED on 5/9 for right eye pain and redness that has been going on for 4 to 5 days, noted redness and irritation.  Denied foreign body trauma.  Patient was diagnosed with conjunctivitis of right eye and was given 10-day course of ofloxacin with ophthalmology follow-up.  Otherwise patient is doing okay at this time.    She denies SOB, cough, CP, new onset MSK pain, abdominal changes, changes in bowel or urinary habits.    R eye conjuctivits:   Dx at [facility] ED, given 10 day course of eye drop ofloxacin.   - Continue with ofloxacin course, complete course.  - Follow-up with ophthalmology    HTN:   BP in office: 148/84.   - Continue Cozaar 100mg daily and prn lasix 20mg (rarely used).  - Keep a BP log, discussed results with nephrologist in 1 to 2 weeks.    CKD 3b, stable:   Thin BM Dx:   Proteinuria:   Bx 4/2022: glomerulomegaly w/ thin basement membrane disease.   Follows OP w/ Dr. Pathak, Nephro.   Cr 1.4 eGFR: 40 (11/6/2024), suspect new baseline 1.2-1.5; stable.   Cr 2/2025: 1.3 eGFR 44. MACr ratio 6/2024: 34.   - Okay to resume PRN lasix at this time for edema.  - BMP on next visit.    - Continue Cozaar 100mg daily for proteinuria.   - Continue OP f/u w/ nephrology.    HLD: Lipid panel 3/2025: , HDL 41, , . On Crestor 20 mg.     Factor V Leiden / Hx Recurrent VTE/PE:   Follows OP w/ [facility] Heme/Onc clinic.   V/Q scan negative for PE 7/31/2024.   - Continue Eliquis 5mg BID.     COPD: PFT 2/2024: FEV1/FVC: 64%. Continue Spiriva daily puff + Albuterol PRN rarely used. Pulmonary rehab offered.    Gout: Frequent flares. Continue Allopurinol 100mg

## 2025-07-14 ENCOUNTER — HOSPITAL ENCOUNTER (EMERGENCY)
Age: 70
Discharge: HOME | End: 2025-07-14
Payer: MEDICARE

## 2025-07-14 VITALS
WEIGHT: 197 LBS | OXYGEN SATURATION: 97 % | HEIGHT: 70 IN | DIASTOLIC BLOOD PRESSURE: 91 MMHG | TEMPERATURE: 97.9 F | BODY MASS INDEX: 28.2 KG/M2 | HEART RATE: 78 BPM | SYSTOLIC BLOOD PRESSURE: 154 MMHG | RESPIRATION RATE: 16 BRPM

## 2025-07-14 DIAGNOSIS — M10.9 ACUTE GOUT INVOLVING TOE OF LEFT FOOT, UNSPECIFIED CAUSE: Primary | ICD-10-CM

## 2025-07-14 PROCEDURE — 99213 OFFICE O/P EST LOW 20 MIN: CPT | Performed by: NURSE PRACTITIONER

## 2025-07-14 PROCEDURE — 99213 OFFICE O/P EST LOW 20 MIN: CPT

## 2025-07-14 RX ORDER — PREDNISONE 10 MG/1
TABLET ORAL
Qty: 27 TABLET | Refills: 0 | Status: SHIPPED | OUTPATIENT
Start: 2025-07-14 | End: 2025-07-23

## 2025-07-14 ASSESSMENT — LIFESTYLE VARIABLES
HOW OFTEN DO YOU HAVE A DRINK CONTAINING ALCOHOL: NEVER
HOW MANY STANDARD DRINKS CONTAINING ALCOHOL DO YOU HAVE ON A TYPICAL DAY: PATIENT DOES NOT DRINK

## 2025-07-14 ASSESSMENT — PAIN DESCRIPTION - ONSET: ONSET: GRADUAL

## 2025-07-14 ASSESSMENT — PAIN DESCRIPTION - FREQUENCY: FREQUENCY: CONTINUOUS

## 2025-07-14 ASSESSMENT — PAIN DESCRIPTION - ORIENTATION: ORIENTATION: LEFT

## 2025-07-14 ASSESSMENT — PAIN DESCRIPTION - LOCATION: LOCATION: FOOT

## 2025-07-14 ASSESSMENT — PAIN DESCRIPTION - DESCRIPTORS: DESCRIPTORS: SHARP

## 2025-07-14 ASSESSMENT — PAIN DESCRIPTION - PAIN TYPE: TYPE: ACUTE PAIN

## 2025-07-14 ASSESSMENT — PAIN SCALES - GENERAL: PAINLEVEL_OUTOF10: 8

## 2025-07-14 NOTE — ED PROVIDER NOTES
THEN 3 tablets daily for 3 days, THEN 2 tablets daily for 3 days., Disp-27 tablet, R-0Normal             Discharge Medication List as of 7/14/2025  1:53 PM        STOP taking these medications       furosemide (LASIX) 20 MG tablet Comments:   Reason for Stopping:               Discharge Medication List as of 7/14/2025  1:53 PM          NIR Raman CNP    (Please note that portions of this note were completed with a voice recognition program. Efforts were made to edit the dictations but occasionally words are mis-transcribed.)            Keiry Dee APRN - CNP  07/14/25 1400    
18

## 2025-07-14 NOTE — ED TRIAGE NOTES
Pt to [facility] ambulatory with left great toe pain/swelling.  This started on Thursday.  Pt has a history of Gout.

## 2025-07-14 NOTE — DISCHARGE INSTRUCTIONS
Watch diet; avoid or decrease in red meats, adriana    Take medications as prescribed    Follow up with PCP in 3-5 days if symptoms do not improve or worsen

## (undated) DEVICE — PENCIL SMK EVAC ALL IN 1 DSGN ENH VISIBILITY IMPROVED AIR

## (undated) DEVICE — CATHETER,FOLEY,100%SILICONE,12FR,10ML,LF: Brand: MEDLINE

## (undated) DEVICE — MONOJECT MAGELLAN 1 ML TUBERCULIN SAFETY SYRINGE PERMANENT NEEDLE 27G X1/2 IN. (0.4 X 13 MM): Brand: MONOJECT

## (undated) DEVICE — CATHETER IV 20 GAX1 IN N WNG KINK RESIST INSYT AUTOGRD

## (undated) DEVICE — GLOVE SURG 7.5 PF POLYMER WHT STRL SIGN LTX ESSENTIAL LTX

## (undated) DEVICE — GOWN,SIRUS,NONRNF,SETINSLV,XL,20/CS: Brand: MEDLINE

## (undated) DEVICE — 1010 S-DRAPE TOWEL DRAPE 10/BX: Brand: STERI-DRAPE™

## (undated) DEVICE — SOLUTION PREP PAINT POV IOD FOR SKIN MUCOUS MEM

## (undated) DEVICE — SUTURE VICRYL SZ 5-0 L18IN ABSRB UD L13MM P-3 3/8 CIR PRIM J493G

## (undated) DEVICE — ADHESIVE SKIN CLSR 0.7ML SGL PEEL PCH GEL WTRPRF FOR WOUNDS

## (undated) DEVICE — SYRINGE MEDICAL 3ML CLEAR PLASTIC STANDARD NON CONTROL LUERLOCK TIP DISPOSABLE

## (undated) DEVICE — 1LYRTR 16FR10ML 100%SILI SNAP: Brand: MEDLINE INDUSTRIES, INC.

## (undated) DEVICE — BLADE,CARBON-STEEL,15,STRL,DISPOSABLE,TB: Brand: MEDLINE

## (undated) DEVICE — DRAPE MICSCP W132XL406CM LENS DIA68MM W VARI LENS2 FOR LEICA

## (undated) DEVICE — SUTURE ETHILON SZ 5-0 L18IN NONABSORBABLE BLK L13MM P-3 3/8 698H

## (undated) DEVICE — SURGIFOAM SPNG SZ 12-7

## (undated) DEVICE — #1020 STERI DRAPE 41MM X 41MM SMALL: Brand: STERI-DRAPE™

## (undated) DEVICE — CONTAINER,SPECIMEN,PNEU TUBE,4OZ,OR STRL: Brand: MEDLINE

## (undated) DEVICE — ENT: Brand: MEDLINE INDUSTRIES, INC.

## (undated) DEVICE — 3M™ STERI-DRAPE™ INSTRUMENT POUCH 1018: Brand: STERI-DRAPE™

## (undated) DEVICE — PACK-MAJOR

## (undated) DEVICE — MASTISOL ADHESIVE LIQ 2/3ML

## (undated) DEVICE — NEEDLE HYPO 27GA L15IN REG BVL W O SFTY FOR SYR DISPOSABLE